# Patient Record
Sex: FEMALE | Race: WHITE | NOT HISPANIC OR LATINO | Employment: OTHER | URBAN - METROPOLITAN AREA
[De-identification: names, ages, dates, MRNs, and addresses within clinical notes are randomized per-mention and may not be internally consistent; named-entity substitution may affect disease eponyms.]

---

## 2017-02-13 RX ORDER — AMLODIPINE BESYLATE 5 MG/1
TABLET ORAL
Qty: 90 TABLET | Refills: 0 | Status: SHIPPED | OUTPATIENT
Start: 2017-02-13 | End: 2017-04-26 | Stop reason: SDUPTHER

## 2017-02-13 RX ORDER — DULOXETIN HYDROCHLORIDE 60 MG/1
CAPSULE, DELAYED RELEASE ORAL
Qty: 90 CAPSULE | Refills: 0 | Status: SHIPPED | OUTPATIENT
Start: 2017-02-13 | End: 2017-05-01 | Stop reason: SDUPTHER

## 2017-04-26 RX ORDER — AMLODIPINE BESYLATE 5 MG/1
TABLET ORAL
Qty: 90 TABLET | Refills: 0 | Status: SHIPPED | OUTPATIENT
Start: 2017-04-26 | End: 2017-05-01 | Stop reason: SDUPTHER

## 2017-05-01 ENCOUNTER — OFFICE VISIT (OUTPATIENT)
Dept: FAMILY MEDICINE CLINIC | Facility: CLINIC | Age: 62
End: 2017-05-01

## 2017-05-01 VITALS
TEMPERATURE: 97.9 F | HEIGHT: 61 IN | HEART RATE: 70 BPM | BODY MASS INDEX: 35.87 KG/M2 | SYSTOLIC BLOOD PRESSURE: 160 MMHG | DIASTOLIC BLOOD PRESSURE: 100 MMHG | OXYGEN SATURATION: 98 % | WEIGHT: 190 LBS

## 2017-05-01 DIAGNOSIS — F32.A ANXIETY AND DEPRESSION: Primary | ICD-10-CM

## 2017-05-01 DIAGNOSIS — E78.2 MIXED HYPERLIPIDEMIA: ICD-10-CM

## 2017-05-01 DIAGNOSIS — F41.9 ANXIETY AND DEPRESSION: Primary | ICD-10-CM

## 2017-05-01 DIAGNOSIS — I10 ESSENTIAL HYPERTENSION: ICD-10-CM

## 2017-05-01 LAB
ALBUMIN SERPL-MCNC: 4.1 G/DL (ref 3.5–5.2)
ALBUMIN/GLOB SERPL: 1.6 G/DL
ALP SERPL-CCNC: 91 U/L (ref 40–129)
ALT SERPL-CCNC: 41 U/L (ref 5–33)
AST SERPL-CCNC: 23 U/L (ref 5–32)
BILIRUB SERPL-MCNC: 0.3 MG/DL (ref 0.2–1.2)
BUN SERPL-MCNC: 14 MG/DL (ref 8–23)
BUN/CREAT SERPL: 16.9 (ref 7–25)
CALCIUM SERPL-MCNC: 9.4 MG/DL (ref 8.8–10.5)
CHLORIDE SERPL-SCNC: 104 MMOL/L (ref 98–107)
CHOLEST SERPL-MCNC: 431 MG/DL (ref 0–200)
CO2 SERPL-SCNC: 29.5 MMOL/L (ref 22–29)
CREAT SERPL-MCNC: 0.83 MG/DL (ref 0.57–1)
ERYTHROCYTE [DISTWIDTH] IN BLOOD BY AUTOMATED COUNT: 14 % (ref 11.5–14.5)
GLOBULIN SER CALC-MCNC: 2.6 GM/DL
GLUCOSE SERPL-MCNC: 116 MG/DL (ref 65–99)
HCT VFR BLD AUTO: 39.4 % (ref 37–47)
HDLC SERPL-MCNC: 57 MG/DL (ref 40–60)
HGB BLD-MCNC: 12.7 G/DL (ref 12–16)
LDLC SERPL CALC-MCNC: 320 MG/DL (ref 0–100)
MCH RBC QN AUTO: 27.5 PG (ref 27–31)
MCHC RBC AUTO-ENTMCNC: 32.2 G/DL (ref 31–37)
MCV RBC AUTO: 85.5 FL (ref 81–99)
PLATELET # BLD AUTO: 238 10*3/MM3 (ref 140–500)
POTASSIUM SERPL-SCNC: 5 MMOL/L (ref 3.5–5.2)
PROT SERPL-MCNC: 6.7 G/DL (ref 6–8.5)
RBC # BLD AUTO: 4.61 10*6/MM3 (ref 4.2–5.4)
SODIUM SERPL-SCNC: 146 MMOL/L (ref 136–145)
TRIGL SERPL-MCNC: 272 MG/DL (ref 0–150)
TSH SERPL DL<=0.005 MIU/L-ACNC: 2.44 MIU/ML (ref 0.27–4.2)
VLDLC SERPL CALC-MCNC: 54.4 MG/DL (ref 7–27)
WBC # BLD AUTO: 5.97 10*3/MM3 (ref 4.8–10.8)

## 2017-05-01 PROCEDURE — 99213 OFFICE O/P EST LOW 20 MIN: CPT | Performed by: FAMILY MEDICINE

## 2017-05-01 RX ORDER — DULOXETIN HYDROCHLORIDE 60 MG/1
60 CAPSULE, DELAYED RELEASE ORAL DAILY
Qty: 90 CAPSULE | Refills: 3 | Status: SHIPPED | OUTPATIENT
Start: 2017-05-01 | End: 2017-05-03 | Stop reason: SDUPTHER

## 2017-05-01 RX ORDER — AMLODIPINE BESYLATE 5 MG/1
5 TABLET ORAL DAILY
Qty: 90 TABLET | Refills: 3 | Status: SHIPPED | OUTPATIENT
Start: 2017-05-01 | End: 2017-05-03 | Stop reason: SDUPTHER

## 2017-05-01 RX ORDER — VALSARTAN AND HYDROCHLOROTHIAZIDE 320; 25 MG/1; MG/1
1 TABLET, FILM COATED ORAL DAILY
Qty: 90 TABLET | Refills: 3 | Status: SHIPPED | OUTPATIENT
Start: 2017-05-01 | End: 2017-05-03 | Stop reason: SDUPTHER

## 2017-05-02 DIAGNOSIS — E78.2 MIXED HYPERLIPIDEMIA: Primary | ICD-10-CM

## 2017-05-02 DIAGNOSIS — Z51.81 MEDICATION MONITORING ENCOUNTER: ICD-10-CM

## 2017-05-03 ENCOUNTER — TELEPHONE (OUTPATIENT)
Dept: FAMILY MEDICINE CLINIC | Facility: CLINIC | Age: 62
End: 2017-05-03

## 2017-05-03 DIAGNOSIS — I10 ESSENTIAL HYPERTENSION: ICD-10-CM

## 2017-05-03 DIAGNOSIS — F41.9 ANXIETY AND DEPRESSION: ICD-10-CM

## 2017-05-03 DIAGNOSIS — F32.A ANXIETY AND DEPRESSION: ICD-10-CM

## 2017-05-03 RX ORDER — AMLODIPINE BESYLATE 5 MG/1
5 TABLET ORAL DAILY
Qty: 90 TABLET | Refills: 3 | Status: SHIPPED | OUTPATIENT
Start: 2017-05-03 | End: 2018-05-23 | Stop reason: SDUPTHER

## 2017-05-03 RX ORDER — VALSARTAN AND HYDROCHLOROTHIAZIDE 320; 25 MG/1; MG/1
1 TABLET, FILM COATED ORAL DAILY
Qty: 90 TABLET | Refills: 3 | Status: SHIPPED | OUTPATIENT
Start: 2017-05-03 | End: 2018-05-18 | Stop reason: SDUPTHER

## 2017-05-03 RX ORDER — DULOXETIN HYDROCHLORIDE 60 MG/1
60 CAPSULE, DELAYED RELEASE ORAL DAILY
Qty: 90 CAPSULE | Refills: 3 | Status: SHIPPED | OUTPATIENT
Start: 2017-05-03 | End: 2018-05-18 | Stop reason: SDUPTHER

## 2017-10-02 ENCOUNTER — RESULTS ENCOUNTER (OUTPATIENT)
Dept: FAMILY MEDICINE CLINIC | Facility: CLINIC | Age: 62
End: 2017-10-02

## 2017-10-02 DIAGNOSIS — E78.2 MIXED HYPERLIPIDEMIA: ICD-10-CM

## 2017-10-02 DIAGNOSIS — Z51.81 MEDICATION MONITORING ENCOUNTER: ICD-10-CM

## 2018-05-18 DIAGNOSIS — I10 ESSENTIAL HYPERTENSION: ICD-10-CM

## 2018-05-18 DIAGNOSIS — F41.9 ANXIETY AND DEPRESSION: ICD-10-CM

## 2018-05-18 DIAGNOSIS — F32.A ANXIETY AND DEPRESSION: ICD-10-CM

## 2018-05-18 RX ORDER — VALSARTAN AND HYDROCHLOROTHIAZIDE 320; 25 MG/1; MG/1
TABLET, FILM COATED ORAL
Qty: 30 TABLET | Refills: 0 | Status: SHIPPED | OUTPATIENT
Start: 2018-05-18 | End: 2018-05-23 | Stop reason: SDUPTHER

## 2018-05-18 RX ORDER — DULOXETIN HYDROCHLORIDE 60 MG/1
CAPSULE, DELAYED RELEASE ORAL
Qty: 30 CAPSULE | Refills: 0 | Status: SHIPPED | OUTPATIENT
Start: 2018-05-18 | End: 2018-05-23 | Stop reason: SDUPTHER

## 2018-05-23 ENCOUNTER — OFFICE VISIT (OUTPATIENT)
Dept: FAMILY MEDICINE CLINIC | Facility: CLINIC | Age: 63
End: 2018-05-23

## 2018-05-23 VITALS
OXYGEN SATURATION: 95 % | TEMPERATURE: 97.7 F | WEIGHT: 179.9 LBS | SYSTOLIC BLOOD PRESSURE: 142 MMHG | BODY MASS INDEX: 33.96 KG/M2 | HEART RATE: 77 BPM | DIASTOLIC BLOOD PRESSURE: 62 MMHG | HEIGHT: 61 IN

## 2018-05-23 DIAGNOSIS — E78.2 MIXED HYPERLIPIDEMIA: ICD-10-CM

## 2018-05-23 DIAGNOSIS — E55.9 AVITAMINOSIS D: ICD-10-CM

## 2018-05-23 DIAGNOSIS — E53.8 B12 DEFICIENCY: ICD-10-CM

## 2018-05-23 DIAGNOSIS — F41.9 ANXIETY AND DEPRESSION: Primary | ICD-10-CM

## 2018-05-23 DIAGNOSIS — F32.A ANXIETY AND DEPRESSION: Primary | ICD-10-CM

## 2018-05-23 DIAGNOSIS — I10 ESSENTIAL HYPERTENSION: ICD-10-CM

## 2018-05-23 DIAGNOSIS — M19.90 ARTHRITIS: ICD-10-CM

## 2018-05-23 DIAGNOSIS — Z00.00 ROUTINE ADULT HEALTH MAINTENANCE: ICD-10-CM

## 2018-05-23 PROCEDURE — 99214 OFFICE O/P EST MOD 30 MIN: CPT | Performed by: FAMILY MEDICINE

## 2018-05-23 RX ORDER — MAGNESIUM GLUCONATE 27 MG(500)
27 TABLET ORAL 2 TIMES DAILY
COMMUNITY

## 2018-05-23 RX ORDER — POTASSIUM CHLORIDE 750 MG/1
10 TABLET, FILM COATED, EXTENDED RELEASE ORAL 2 TIMES DAILY
COMMUNITY
End: 2018-08-14

## 2018-05-23 RX ORDER — VITAMIN B COMPLEX
CAPSULE ORAL DAILY
COMMUNITY
End: 2018-08-14

## 2018-05-23 RX ORDER — VALSARTAN AND HYDROCHLOROTHIAZIDE 320; 25 MG/1; MG/1
1 TABLET, FILM COATED ORAL DAILY
Qty: 90 TABLET | Refills: 3 | Status: SHIPPED | OUTPATIENT
Start: 2018-05-23 | End: 2018-07-23 | Stop reason: RX

## 2018-05-23 RX ORDER — DULOXETIN HYDROCHLORIDE 30 MG/1
30 CAPSULE, DELAYED RELEASE ORAL DAILY
Qty: 7 CAPSULE | Refills: 0 | Status: SHIPPED | OUTPATIENT
Start: 2018-05-23 | End: 2018-05-30

## 2018-05-23 RX ORDER — AMLODIPINE BESYLATE 10 MG/1
10 TABLET ORAL DAILY
Qty: 90 TABLET | Refills: 3 | Status: SHIPPED | OUTPATIENT
Start: 2018-05-23 | End: 2018-10-24 | Stop reason: SDUPTHER

## 2018-05-23 RX ORDER — FAMOTIDINE 20 MG
5000 TABLET ORAL
COMMUNITY

## 2018-05-23 RX ORDER — TURMERIC ROOT EXTRACT 500 MG
TABLET ORAL
COMMUNITY
End: 2019-03-26

## 2018-05-23 NOTE — PROGRESS NOTES
"  Chief Complaint   Patient presents with   • Follow-up   • Arthritis   • Hypertension   • Hyperlipidemia       Subjective     Patient here for follow-up of elevated blood pressure.    She is not exercising and is not adherent to a low-salt diet.    Blood pressure is well controlled at home.   Cardiac symptoms: fatigue.   Patient denies: chest pressure/discomfort, claudication, cough, dyspnea, exertional chest pressure/discomfort, irregular heart beat, lower extremity edema, near-syncope and palpitations.   Cardiovascular risk factors: dyslipidemia, family history of premature cardiovascular disease, hypertension and sedentary lifestyle.   Use of agents associated with hypertension: none.   History of target organ damage: none.  Patient is taking prescribed hypertension medications as prescribed without side effects.    C/o all over joint pain.   B ankles, B knee, B hip, low back.  600 mg ibuprofen helps only some.    Has a busy life she has custody of her grand daughter.    Out of refills  Due for labs    The following portions of the patient's history were reviewed and updated as appropriate: allergies, current medications, past family history, past medical history, past social history and problem list.    Review of Systems  A comprehensive review of systems was negative except for: Musculoskeletal: positive for arthralgias, back pain and stiff joints         Vitals:    05/23/18 1600   BP: 142/62   BP Location: Left arm   Patient Position: Sitting   Pulse: 77   Temp: 97.7 °F (36.5 °C)   TempSrc: Oral   SpO2: 95%   Weight: 81.6 kg (179 lb 14.4 oz)   Height: 154.9 cm (61\")     Objective    Gen: alert, pleasant.  HEENT: PERRL, EOMI intact, lids ok, ear canals clear, TMs normal, throat clear, nostrils normal  Neck: no bruit, no enlarged thyroid  Lungs: CTA  Heart: RR no murmur  ABD: soft , + BS  Pulses: intact  Mood: stable     Assessment/Plan   Hypertension, normal blood pressure Evidence of target organ damage: " none.    Nery was seen today for follow-up, arthritis, hypertension and hyperlipidemia.    Diagnoses and all orders for this visit:    Anxiety and depression  -     DULoxetine (CYMBALTA) 30 MG capsule; Take 1 capsule by mouth Daily for 7 days.    Essential hypertension  -     CBC (No Diff); Future  -     Comprehensive Metabolic Panel; Future  -     amLODIPine (NORVASC) 10 MG tablet; Take 1 tablet by mouth Daily.  -     valsartan-hydrochlorothiazide (DIOVAN-HCT) 320-25 MG per tablet; Take 1 tablet by mouth Daily. 200001    Mixed hyperlipidemia  -     Lipid Panel; Future  -     Cancel: TSH  -     TSH; Future    Avitaminosis D  -     Vitamin D 25 Hydroxy; Future    B12 deficiency  -     Vitamin B12; Future    Routine adult health maintenance    Arthritis  -     RUBÉN w/Reflex; Future  -     Cyclic citrul peptide antibody, IgG/IgA; Future  -     CK; Future  -     C-reactive protein; Future  -     Rheumatoid factor; Future  -     Sedimentation rate; Future      Medication: no change.  Follow up: 6 months and as needed.    There are no Patient Instructions on file for this visit.  Medications Discontinued During This Encounter   Medication Reason   • Cyanocobalamin (VITAMIN B 12 PO) *Therapy completed   • amLODIPine (NORVASC) 5 MG tablet Reorder   • DULoxetine (CYMBALTA) 60 MG capsule Reorder   • valsartan-hydrochlorothiazide (DIOVAN-HCT) 320-25 MG per tablet Reorder        Return in about 6 months (around 11/23/2018) for blood pressure.    Limit salt  Limit alcoholic drinks to 1 a day  Limit caffeine to 1-2 servings a day    Dr. John Parker MD  [place], [place]  [facility].

## 2018-05-24 DIAGNOSIS — I10 ESSENTIAL HYPERTENSION: ICD-10-CM

## 2018-05-24 DIAGNOSIS — E53.8 B12 DEFICIENCY: ICD-10-CM

## 2018-05-24 DIAGNOSIS — E55.9 AVITAMINOSIS D: ICD-10-CM

## 2018-05-24 DIAGNOSIS — E78.2 MIXED HYPERLIPIDEMIA: ICD-10-CM

## 2018-05-24 DIAGNOSIS — M19.90 ARTHRITIS: ICD-10-CM

## 2018-05-30 DIAGNOSIS — E78.2 MIXED HYPERLIPIDEMIA: Primary | ICD-10-CM

## 2018-05-30 LAB
25(OH)D3+25(OH)D2 SERPL-MCNC: 46.8 NG/ML
ALBUMIN SERPL-MCNC: 4.2 G/DL (ref 3.5–5.2)
ALBUMIN/GLOB SERPL: 1.9 G/DL
ALP SERPL-CCNC: 79 U/L (ref 40–129)
ALT SERPL-CCNC: 25 U/L (ref 5–33)
ANA SER QL: NEGATIVE
AST SERPL-CCNC: 21 U/L (ref 5–32)
BILIRUB SERPL-MCNC: 0.3 MG/DL (ref 0.2–1.2)
BUN SERPL-MCNC: 16 MG/DL (ref 8–23)
BUN/CREAT SERPL: 23.9 (ref 7–25)
CALCIUM SERPL-MCNC: 9.6 MG/DL (ref 8.8–10.5)
CCP IGA+IGG SERPL IA-ACNC: 4 UNITS (ref 0–19)
CHLORIDE SERPL-SCNC: 99 MMOL/L (ref 98–107)
CHOLEST SERPL-MCNC: 368 MG/DL (ref 0–200)
CK SERPL-CCNC: 111 U/L (ref 26–142)
CO2 SERPL-SCNC: 29.3 MMOL/L (ref 22–29)
CREAT SERPL-MCNC: 0.67 MG/DL (ref 0.57–1)
CRP SERPL-MCNC: 0.23 MG/DL (ref 0–0.5)
ERYTHROCYTE [DISTWIDTH] IN BLOOD BY AUTOMATED COUNT: 13.2 % (ref 11.5–14.5)
ERYTHROCYTE [SEDIMENTATION RATE] IN BLOOD BY WESTERGREN METHOD: 6 MM/HR (ref 0–30)
GFR SERPLBLD CREATININE-BSD FMLA CKD-EPI: 107 ML/MIN/1.73
GFR SERPLBLD CREATININE-BSD FMLA CKD-EPI: 89 ML/MIN/1.73
GLOBULIN SER CALC-MCNC: 2.2 GM/DL
GLUCOSE SERPL-MCNC: 107 MG/DL (ref 65–99)
HCT VFR BLD AUTO: 43.5 % (ref 37–47)
HDLC SERPL-MCNC: 64 MG/DL (ref 40–60)
HGB BLD-MCNC: 14.5 G/DL (ref 12–16)
LDLC SERPL CALC-MCNC: 270 MG/DL (ref 0–100)
MCH RBC QN AUTO: 29.1 PG (ref 27–31)
MCHC RBC AUTO-ENTMCNC: 33.3 G/DL (ref 31–37)
MCV RBC AUTO: 87.3 FL (ref 81–99)
PLATELET # BLD AUTO: 238 10*3/MM3 (ref 140–500)
POTASSIUM SERPL-SCNC: 3.7 MMOL/L (ref 3.5–5.2)
PROT SERPL-MCNC: 6.4 G/DL (ref 6–8.5)
RBC # BLD AUTO: 4.98 10*6/MM3 (ref 4.2–5.4)
RHEUMATOID FACT SERPL-ACNC: <10 IU/ML (ref 0–13.9)
SODIUM SERPL-SCNC: 141 MMOL/L (ref 136–145)
TRIGL SERPL-MCNC: 171 MG/DL (ref 0–150)
TSH SERPL DL<=0.005 MIU/L-ACNC: 1.58 MIU/ML (ref 0.27–4.2)
VIT B12 SERPL-MCNC: 610 PG/ML
VLDLC SERPL CALC-MCNC: 34.2 MG/DL (ref 7–27)
WBC # BLD AUTO: 6.24 10*3/MM3 (ref 4.8–10.8)

## 2018-05-30 RX ORDER — EZETIMIBE 10 MG/1
10 TABLET ORAL DAILY
Qty: 90 TABLET | Refills: 3 | Status: SHIPPED | OUTPATIENT
Start: 2018-05-30 | End: 2018-08-14

## 2018-07-23 ENCOUNTER — TELEPHONE (OUTPATIENT)
Dept: FAMILY MEDICINE CLINIC | Facility: CLINIC | Age: 63
End: 2018-07-23

## 2018-07-23 DIAGNOSIS — I10 ESSENTIAL HYPERTENSION: Primary | ICD-10-CM

## 2018-07-23 RX ORDER — LOSARTAN POTASSIUM AND HYDROCHLOROTHIAZIDE 25; 100 MG/1; MG/1
1 TABLET ORAL DAILY
Qty: 30 TABLET | Refills: 2 | Status: SHIPPED | OUTPATIENT
Start: 2018-07-23 | End: 2018-07-24 | Stop reason: SDUPTHER

## 2018-07-23 NOTE — TELEPHONE ENCOUNTER
Notify patient that FDA has recalled her valsartan/HCTZ medication.  I have changed to Hyzaar 100/25 mg.  New prescription was sent to [facility].  Please have her schedule follow-up appointment with Dr. Parker in the next 4 weeks for blood pressure check.

## 2018-07-24 DIAGNOSIS — I10 ESSENTIAL HYPERTENSION: ICD-10-CM

## 2018-07-24 RX ORDER — LOSARTAN POTASSIUM AND HYDROCHLOROTHIAZIDE 25; 100 MG/1; MG/1
1 TABLET ORAL DAILY
Qty: 30 TABLET | Refills: 2 | Status: SHIPPED | OUTPATIENT
Start: 2018-07-24 | End: 2018-08-14 | Stop reason: SINTOL

## 2018-07-24 NOTE — TELEPHONE ENCOUNTER
Pt infomed and medication was sent to [facility] in [place] due to willi being out of the hyzaar

## 2018-08-14 ENCOUNTER — OFFICE VISIT (OUTPATIENT)
Dept: FAMILY MEDICINE CLINIC | Facility: CLINIC | Age: 63
End: 2018-08-14

## 2018-08-14 VITALS
HEIGHT: 61 IN | WEIGHT: 184 LBS | OXYGEN SATURATION: 96 % | DIASTOLIC BLOOD PRESSURE: 100 MMHG | HEART RATE: 79 BPM | SYSTOLIC BLOOD PRESSURE: 148 MMHG | BODY MASS INDEX: 34.74 KG/M2

## 2018-08-14 DIAGNOSIS — I10 ESSENTIAL HYPERTENSION: Primary | ICD-10-CM

## 2018-08-14 DIAGNOSIS — E78.2 MIXED HYPERLIPIDEMIA: ICD-10-CM

## 2018-08-14 PROCEDURE — 99214 OFFICE O/P EST MOD 30 MIN: CPT | Performed by: FAMILY MEDICINE

## 2018-08-14 RX ORDER — EZETIMIBE 10 MG/1
10 TABLET ORAL DAILY
Qty: 30 TABLET | Refills: 11 | Status: SHIPPED | OUTPATIENT
Start: 2018-08-14 | End: 2018-10-19 | Stop reason: SDUPTHER

## 2018-08-14 RX ORDER — PREGABALIN 50 MG/1
50 CAPSULE ORAL 3 TIMES DAILY
Qty: 90 CAPSULE | Refills: 1 | Status: SHIPPED | OUTPATIENT
Start: 2018-08-14 | End: 2018-09-21

## 2018-08-14 NOTE — PROGRESS NOTES
Chief Complaint   Patient presents with   • Follow-up   • Hypertension   • Hyperlipidemia   • Fibromyalgia       Subjective    Diovan was changes to Hyzaar due to drug recall.      Patient here for follow-up of elevated blood pressure.    She is not exercising and is adherent to a low-salt diet.    Blood pressure is not well controlled at home.   Cardiac symptoms: fatigue, lower extremity edema and weight gain.   Patient denies: chest pain.   Cardiovascular risk factors: advanced age (older than 55 for men, 65 for women), dyslipidemia, family history of premature cardiovascular disease, hypertension, obesity (BMI >= 30 kg/m2) and sedentary lifestyle.   Use of agents associated with hypertension: none.   History of target organ damage: none.  Patient is taking prescribed hypertension medications as prescribed without side effects.        The following portions of the patient's history were reviewed and updated as appropriate: allergies, current medications, past family history, past medical history, past social history, past surgical history and problem list.    Review of Systems  A comprehensive review of systems was negative except for: Constitutional: positive for fatigue and malaise  Musculoskeletal: positive for arthralgias, back pain, muscle weakness, myalgias, neck pain and stiff joints  Behavioral/Psych: positive for anxiety, depression, mood swings, obesity and sleep disturbance    Results for orders placed or performed in visit on 05/24/18   CBC (No Diff)   Result Value Ref Range    WBC 6.24 4.80 - 10.80 10*3/mm3    RBC 4.98 4.20 - 5.40 10*6/mm3    Hemoglobin 14.5 12.0 - 16.0 g/dL    Hematocrit 43.5 37.0 - 47.0 %    MCV 87.3 81.0 - 99.0 fL    MCH 29.1 27.0 - 31.0 pg    MCHC 33.3 31.0 - 37.0 g/dL    RDW 13.2 11.5 - 14.5 %    Platelets 238 140 - 500 10*3/mm3   Comprehensive Metabolic Panel   Result Value Ref Range    Glucose 107 (H) 65 - 99 mg/dL    BUN 16 8 - 23 mg/dL    Creatinine 0.67 0.57 - 1.00 mg/dL  "   eGFR Non African Am 89 >60 mL/min/1.73    eGFR African Am 107 >60 mL/min/1.73    BUN/Creatinine Ratio 23.9 7.0 - 25.0    Sodium 141 136 - 145 mmol/L    Potassium 3.7 3.5 - 5.2 mmol/L    Chloride 99 98 - 107 mmol/L    Total CO2 29.3 (H) 22.0 - 29.0 mmol/L    Calcium 9.6 8.8 - 10.5 mg/dL    Total Protein 6.4 6.0 - 8.5 g/dL    Albumin 4.20 3.50 - 5.20 g/dL    Globulin 2.2 gm/dL    A/G Ratio 1.9 g/dL    Total Bilirubin 0.3 0.2 - 1.2 mg/dL    Alkaline Phosphatase 79 40 - 129 U/L    AST (SGOT) 21 5 - 32 U/L    ALT (SGPT) 25 5 - 33 U/L   Vitamin D 25 Hydroxy   Result Value Ref Range    25 Hydroxy, Vitamin D 46.8 ng/ml   Vitamin B12   Result Value Ref Range    Vitamin B-12 610 232-1,245 pg/mL   RUBÉN w/Reflex   Result Value Ref Range    RUBÉN Direct Negative Negative   Cyclic citrul peptide antibody, IgG/IgA   Result Value Ref Range    CCP Antibodies IgG/IgA 4 0 - 19 units   CK   Result Value Ref Range    Creatine Kinase 111 26 - 142 U/L   C-reactive protein   Result Value Ref Range    C-Reactive Protein 0.23 0.00 - 0.50 mg/dL   Sedimentation rate   Result Value Ref Range    Sed Rate 6 0 - 30 mm/hr   TSH   Result Value Ref Range    TSH 1.580 0.270 - 4.200 mIU/mL   Lipid Panel   Result Value Ref Range    Total Cholesterol 368 (H) 0 - 200 mg/dL    Triglycerides 171 (H) 0 - 150 mg/dL    HDL Cholesterol 64 (H) 40 - 60 mg/dL    VLDL Cholesterol 34.2 (H) 7 - 27 mg/dL    LDL Cholesterol  270 (H) 0 - 100 mg/dL   Rheumatoid Factor   Result Value Ref Range    RA Latex Turbid <10.0 0.0 - 13.9 IU/mL        Vitals:    08/14/18 1545   BP: 148/100   BP Location: Left arm   Patient Position: Sitting   Pulse: 79   SpO2: 96%   Weight: 83.5 kg (184 lb)   Height: 154.9 cm (61\")     Objective    Gen: alert, pleasant.  HEENT: PERRL, EOMI intact, lids ok, ear canals clear, TMs normal, throat clear, nostrils normal  Neck: no bruit, no enlarged thyroid  Lungs: CTA  Heart: RR no murmur  ABD: soft , + BS  Pulses: intact  Mood: stable   "   Assessment/Plan   Hypertension, stage 1 Evidence of target organ damage: none.    Nery was seen today for follow-up, hypertension, hyperlipidemia and fibromyalgia.    Diagnoses and all orders for this visit:    Essential hypertension    Mixed hyperlipidemia  -     ezetimibe (ZETIA) 10 MG tablet; Take 1 tablet by mouth Daily.    Other orders  -     pregabalin (LYRICA) 50 MG capsule; Take 1 capsule by mouth 3 (Three) Times a Day.    lets try Lyrica for fibromyalgia.  Cymbalta did not help.  Will re send Zetia to a different pharm.    Medication: discontinue hyzaar due to suspected fatigue.  Follow up: 1 month and as needed.    There are no Patient Instructions on file for this visit.  Medications Discontinued During This Encounter   Medication Reason   • B Complex Vitamins (VITAMIN B COMPLEX) capsule capsule *Therapy completed   • ezetimibe (ZETIA) 10 MG tablet *Therapy completed   • Garlic 10 MG capsule *Therapy completed   • NIACIN PO *Therapy completed   • potassium chloride (K-DUR) 10 MEQ CR tablet *Therapy completed   • losartan-hydrochlorothiazide (HYZAAR) 100-25 MG per tablet Side effects        Return in about 1 month (around 9/14/2018) for blood pressure, new medication follow up.    Patient has been prescribed controlled medications.  Treatment discussed  DAT updated and reviewed.  Risk and Benefits discussed.  Per [place]HB1.  Limit salt  Limit alcoholic drinks to 1 a day  Limit caffeine to 1-2 servings a day    Dr. John Parker MD  [place], [place]  [facility].

## 2018-08-27 ENCOUNTER — TELEPHONE (OUTPATIENT)
Dept: FAMILY MEDICINE CLINIC | Facility: CLINIC | Age: 63
End: 2018-08-27

## 2018-08-27 DIAGNOSIS — M79.7 FIBROMYALGIA: Primary | ICD-10-CM

## 2018-08-27 RX ORDER — AMITRIPTYLINE HYDROCHLORIDE 25 MG/1
25 TABLET, FILM COATED ORAL NIGHTLY
Qty: 30 TABLET | Refills: 5 | Status: SHIPPED | OUTPATIENT
Start: 2018-08-27 | End: 2018-09-21 | Stop reason: SINTOL

## 2018-08-27 NOTE — TELEPHONE ENCOUNTER
Pt called back and said she would like to try the Elavil that you suggested instead of her Lyrica.     Ok i sent in the elavil 25 mg , take every evening    John Parker MD

## 2018-09-21 ENCOUNTER — OFFICE VISIT (OUTPATIENT)
Dept: FAMILY MEDICINE CLINIC | Facility: CLINIC | Age: 63
End: 2018-09-21

## 2018-09-21 VITALS
OXYGEN SATURATION: 97 % | HEIGHT: 61 IN | SYSTOLIC BLOOD PRESSURE: 158 MMHG | HEART RATE: 71 BPM | BODY MASS INDEX: 33.79 KG/M2 | WEIGHT: 179 LBS | DIASTOLIC BLOOD PRESSURE: 100 MMHG

## 2018-09-21 DIAGNOSIS — I10 ESSENTIAL HYPERTENSION: Primary | ICD-10-CM

## 2018-09-21 DIAGNOSIS — M19.90 ARTHRITIS: ICD-10-CM

## 2018-09-21 PROCEDURE — 99213 OFFICE O/P EST LOW 20 MIN: CPT | Performed by: FAMILY MEDICINE

## 2018-09-21 RX ORDER — HYDROCHLOROTHIAZIDE 25 MG/1
25 TABLET ORAL DAILY
Qty: 30 TABLET | Refills: 5 | Status: SHIPPED | OUTPATIENT
Start: 2018-09-21 | End: 2018-10-19 | Stop reason: SDUPTHER

## 2018-09-21 RX ORDER — IBUPROFEN 800 MG/1
800 TABLET ORAL 3 TIMES DAILY PRN
Qty: 90 TABLET | Refills: 5 | Status: SHIPPED | OUTPATIENT
Start: 2018-09-21 | End: 2019-09-03

## 2018-09-21 NOTE — PROGRESS NOTES
"  Subjective   Nery Jarrell is a 63 y.o. female who is here for   Chief Complaint   Patient presents with   • Follow-up   • Anxiety   • Hypertension   • Fatigue   • Muscle Pain   .     History of Present Illness   Last OV we tried Lyrica for her chronic wide spread pain  Insurance denied med  So i sent in Elavil to replace.  Did not take.  Just wants to take Motrin  And get bp down  Ok by me.        The following portions of the patient's history were reviewed and updated as appropriate: allergies, current medications, past family history, past medical history, past social history, past surgical history and problem list.    Review of Systems  Vitals:    09/21/18 1426   BP: 158/100   BP Location: Left arm   Patient Position: Sitting   Pulse: 71   SpO2: 97%   Weight: 81.2 kg (179 lb)   Height: 154.9 cm (61\")     Objective   Physical Exam   Constitutional: She appears well-developed and well-nourished.   Cardiovascular: Normal rate.    Pulmonary/Chest: Effort normal.   Neurological: She is alert.   Nursing note and vitals reviewed.      Assessment/Plan   Nery was seen today for follow-up, anxiety, hypertension, fatigue and muscle pain.    Diagnoses and all orders for this visit:    Essential hypertension  -     hydrochlorothiazide (HYDRODIURIL) 25 MG tablet; Take 1 tablet by mouth Daily.    Arthritis  -     ibuprofen (ADVIL,MOTRIN) 800 MG tablet; Take 1 tablet by mouth 3 (Three) Times a Day As Needed for Mild Pain .    already on Norvasc 10  Lets add HCTZ  There are no Patient Instructions on file for this visit.    Medications Discontinued During This Encounter   Medication Reason   • amitriptyline (ELAVIL) 25 MG tablet Side effects   • pregabalin (LYRICA) 50 MG capsule *Therapy completed        Return in about 6 months (around 3/21/2019) for blood pressure, new medication follow up.    Dr. John Parker  [place], [place]  "

## 2018-10-19 DIAGNOSIS — E78.2 MIXED HYPERLIPIDEMIA: ICD-10-CM

## 2018-10-19 DIAGNOSIS — I10 ESSENTIAL HYPERTENSION: ICD-10-CM

## 2018-10-19 RX ORDER — EZETIMIBE 10 MG/1
10 TABLET ORAL DAILY
Qty: 90 TABLET | Refills: 3 | Status: SHIPPED | OUTPATIENT
Start: 2018-10-19 | End: 2019-03-26 | Stop reason: SDUPTHER

## 2018-10-19 RX ORDER — HYDROCHLOROTHIAZIDE 25 MG/1
25 TABLET ORAL DAILY
Qty: 90 TABLET | Refills: 1 | Status: SHIPPED | OUTPATIENT
Start: 2018-10-19 | End: 2019-04-29 | Stop reason: SDUPTHER

## 2018-10-24 DIAGNOSIS — I10 ESSENTIAL HYPERTENSION: ICD-10-CM

## 2018-10-24 RX ORDER — AMLODIPINE BESYLATE 10 MG/1
10 TABLET ORAL DAILY
Qty: 90 TABLET | Refills: 3 | Status: SHIPPED | OUTPATIENT
Start: 2018-10-24 | End: 2019-03-26 | Stop reason: SINTOL

## 2019-03-12 DIAGNOSIS — Z00.00 ROUTINE ADULT HEALTH MAINTENANCE: ICD-10-CM

## 2019-03-12 DIAGNOSIS — E78.2 MIXED HYPERLIPIDEMIA: Primary | ICD-10-CM

## 2019-03-12 DIAGNOSIS — I10 ESSENTIAL HYPERTENSION: ICD-10-CM

## 2019-03-14 LAB
ALBUMIN SERPL-MCNC: 4.3 G/DL (ref 3.5–5.2)
ALBUMIN/GLOB SERPL: 1.8 G/DL
ALP SERPL-CCNC: 120 U/L (ref 39–117)
ALT SERPL-CCNC: 43 U/L (ref 1–33)
APPEARANCE UR: CLEAR
AST SERPL-CCNC: 26 U/L (ref 1–32)
BACTERIA #/AREA URNS HPF: ABNORMAL /HPF
BILIRUB SERPL-MCNC: 0.4 MG/DL (ref 0.1–1.2)
BILIRUB UR QL STRIP: NEGATIVE
BUN SERPL-MCNC: 17 MG/DL (ref 8–23)
BUN/CREAT SERPL: 20.7 (ref 7–25)
CALCIUM SERPL-MCNC: 9.6 MG/DL (ref 8.6–10.5)
CASTS URNS MICRO: ABNORMAL
CHLORIDE SERPL-SCNC: 102 MMOL/L (ref 98–107)
CHOLEST SERPL-MCNC: 318 MG/DL (ref 0–200)
CO2 SERPL-SCNC: 27.1 MMOL/L (ref 22–29)
COLOR UR: YELLOW
CREAT SERPL-MCNC: 0.82 MG/DL (ref 0.57–1)
EPI CELLS #/AREA URNS HPF: ABNORMAL /HPF
ERYTHROCYTE [DISTWIDTH] IN BLOOD BY AUTOMATED COUNT: 14.3 % (ref 12.3–15.4)
GLOBULIN SER CALC-MCNC: 2.4 GM/DL
GLUCOSE SERPL-MCNC: 109 MG/DL (ref 65–99)
GLUCOSE UR QL: NEGATIVE
HCT VFR BLD AUTO: 45.4 % (ref 34–46.6)
HDLC SERPL-MCNC: 64 MG/DL (ref 40–60)
HGB BLD-MCNC: 14.3 G/DL (ref 12–15.9)
HGB UR QL STRIP: NEGATIVE
KETONES UR QL STRIP: NEGATIVE
LDLC SERPL CALC-MCNC: 232 MG/DL (ref 0–100)
LDLC/HDLC SERPL: 3.63 {RATIO}
LEUKOCYTE ESTERASE UR QL STRIP: ABNORMAL
MCH RBC QN AUTO: 27.9 PG (ref 26.6–33)
MCHC RBC AUTO-ENTMCNC: 31.5 G/DL (ref 31.5–35.7)
MCV RBC AUTO: 88.7 FL (ref 79–97)
NITRITE UR QL STRIP: NEGATIVE
PH UR STRIP: 5.5 [PH] (ref 5–8)
PLATELET # BLD AUTO: 217 10*3/MM3 (ref 140–450)
POTASSIUM SERPL-SCNC: 4.6 MMOL/L (ref 3.5–5.2)
PROT SERPL-MCNC: 6.7 G/DL (ref 6–8.5)
PROT UR QL STRIP: NEGATIVE
RBC # BLD AUTO: 5.12 10*6/MM3 (ref 3.77–5.28)
RBC #/AREA URNS HPF: ABNORMAL /HPF
SODIUM SERPL-SCNC: 140 MMOL/L (ref 136–145)
SP GR UR: 1.02 (ref 1–1.03)
TRIGL SERPL-MCNC: 109 MG/DL (ref 0–150)
TSH SERPL DL<=0.005 MIU/L-ACNC: 2.41 MIU/ML (ref 0.27–4.2)
UROBILINOGEN UR STRIP-MCNC: ABNORMAL MG/DL
VLDLC SERPL CALC-MCNC: 21.8 MG/DL (ref 5–40)
WBC # BLD AUTO: 6.45 10*3/MM3 (ref 3.4–10.8)
WBC #/AREA URNS HPF: ABNORMAL /HPF

## 2019-03-26 ENCOUNTER — OFFICE VISIT (OUTPATIENT)
Dept: FAMILY MEDICINE CLINIC | Facility: CLINIC | Age: 64
End: 2019-03-26

## 2019-03-26 VITALS
OXYGEN SATURATION: 97 % | WEIGHT: 182 LBS | SYSTOLIC BLOOD PRESSURE: 182 MMHG | HEART RATE: 69 BPM | DIASTOLIC BLOOD PRESSURE: 112 MMHG | HEIGHT: 61 IN | BODY MASS INDEX: 34.36 KG/M2

## 2019-03-26 DIAGNOSIS — Z00.00 MEDICARE WELCOME EXAM: ICD-10-CM

## 2019-03-26 DIAGNOSIS — Z00.00 ROUTINE ADULT HEALTH MAINTENANCE: ICD-10-CM

## 2019-03-26 DIAGNOSIS — E78.2 MIXED HYPERLIPIDEMIA: ICD-10-CM

## 2019-03-26 DIAGNOSIS — I10 UNCONTROLLED HYPERTENSION: ICD-10-CM

## 2019-03-26 DIAGNOSIS — Z23 NEED FOR VACCINATION FOR PNEUMOCOCCUS: ICD-10-CM

## 2019-03-26 DIAGNOSIS — I10 ESSENTIAL HYPERTENSION: Primary | ICD-10-CM

## 2019-03-26 DIAGNOSIS — Z12.11 SCREEN FOR COLON CANCER: ICD-10-CM

## 2019-03-26 PROCEDURE — G0403 EKG FOR INITIAL PREVENT EXAM: HCPCS | Performed by: FAMILY MEDICINE

## 2019-03-26 PROCEDURE — G0402 INITIAL PREVENTIVE EXAM: HCPCS | Performed by: FAMILY MEDICINE

## 2019-03-26 PROCEDURE — 90670 PCV13 VACCINE IM: CPT | Performed by: FAMILY MEDICINE

## 2019-03-26 PROCEDURE — 99213 OFFICE O/P EST LOW 20 MIN: CPT | Performed by: FAMILY MEDICINE

## 2019-03-26 PROCEDURE — G0009 ADMIN PNEUMOCOCCAL VACCINE: HCPCS | Performed by: FAMILY MEDICINE

## 2019-03-26 RX ORDER — ROSUVASTATIN CALCIUM 5 MG/1
5 TABLET, COATED ORAL DAILY
Qty: 90 TABLET | Refills: 3 | Status: SHIPPED | OUTPATIENT
Start: 2019-03-26 | End: 2020-03-24

## 2019-03-26 RX ORDER — IRBESARTAN 300 MG/1
300 TABLET ORAL EVERY MORNING
Qty: 30 TABLET | Refills: 5 | Status: SHIPPED | OUTPATIENT
Start: 2019-03-26 | End: 2019-04-29 | Stop reason: SDUPTHER

## 2019-03-26 RX ORDER — EZETIMIBE 10 MG/1
10 TABLET ORAL DAILY
Qty: 90 TABLET | Refills: 3 | Status: SHIPPED | OUTPATIENT
Start: 2019-03-26 | End: 2020-02-21

## 2019-03-26 NOTE — PROGRESS NOTES
Welcome to Medicare Wellness Visit   The ABC's of the Annual Wellness Visit    Chief Complaint   Patient presents with   • Welcome To Medicare       HPI:  Nery Jarrell, -1955, is a 64 y.o. female who presents for a Welcome to Medicare Wellness Visit.    Recent Hospitalizations:  No hospitalization(s) within the last year..    Current Medical Providers:  Patient Care Team:  John Parker MD as PCP - General  [facility], Luz Haque MD as Consulting Physician (Obstetrics and Gynecology)  Kareem, Iggy Díaz MD as Consulting Physician (Gastroenterology)    Health Habits and Functional and Cognitive Screening and Depression Screening:  Functional & Cognitive Status 3/26/2019   Do you have difficulty preparing food and eating? No   Do you have difficulty bathing yourself, getting dressed or grooming yourself? No   Do you have difficulty using the toilet? No   Do you have difficulty moving around from place to place? Yes   Do you have trouble with steps or getting out of a bed or a chair? No   In the past year have you fallen or experienced a near fall? No   Current Diet Well Balanced Diet   Dental Exam Up to date   Eye Exam Not up to date   Exercise (times per week) 0 times per week   Current Exercise Activities Include None   Do you need help using the phone?  No   Are you deaf or do you have serious difficulty hearing?  No   Do you need help with transportation? No   Do you need help shopping? No   Do you need help preparing meals?  No   Do you need help with housework?  No   Do you need help with laundry? No   Do you need help taking your medications? No   Do you need help managing money? No   Do you ever drive or ride in a car without wearing a seat belt? No   Have you felt unusual stress, anger or loneliness in the last month? No   Who do you live with? Spouse   If you need help, do you have trouble finding someone available to you? No   Have you been bothered in the last four weeks by sexual  problems? No   Do you have difficulty concentrating, remembering or making decisions? No       Compared to one year ago, the patient feels her physical health is the same and her mental health is the same.    Depression Screen:  PHQ-2/PHQ-9 Depression Screening 3/26/2019   Little interest or pleasure in doing things 0   Feeling down, depressed, or hopeless 0   Total Score 0       Past Medical/Family/Social History:  The following portions of the patient's history were reviewed and updated as appropriate: allergies, current medications, past family history, past medical history, past social history, past surgical history and problem list.    Allergies   Allergen Reactions   • Elavil [Amitriptyline Hcl] Dizziness   • Norvasc [Amlodipine Besylate] Swelling     Ankles swelling         Current Outpatient Medications:   •  ezetimibe (ZETIA) 10 MG tablet, Take 1 tablet by mouth Daily., Disp: 90 tablet, Rfl: 3  •  hydrochlorothiazide (HYDRODIURIL) 25 MG tablet, Take 1 tablet by mouth Daily., Disp: 90 tablet, Rfl: 1  •  ibuprofen (ADVIL,MOTRIN) 800 MG tablet, Take 1 tablet by mouth 3 (Three) Times a Day As Needed for Mild Pain ., Disp: 90 tablet, Rfl: 5  •  magnesium gluconate (MAGONATE) 500 MG tablet, Take 27 mg by mouth 2 (Two) Times a Day., Disp: , Rfl:   •  Vitamin D, Cholecalciferol, 1000 units capsule, Take 5,000 Units by mouth., Disp: , Rfl:   •  irbesartan (AVAPRO) 300 MG tablet, Take 1 tablet by mouth Every Morning., Disp: 30 tablet, Rfl: 5  •  rosuvastatin (CRESTOR) 5 MG tablet, Take 1 tablet by mouth Daily., Disp: 90 tablet, Rfl: 3    Aspirin use counseling: Does not need ASA (and currently is not on it)    Current medication list contains no high risk medications.  No harmful drug interactions have been identified.     Family History   Problem Relation Age of Onset   • COPD Mother    • Heart disease Father    • Kidney disease Father    • Vision loss Father    • Cancer Father    • Diabetes Father    • Stroke  "Brother    • Hypertension Brother    • Coronary artery disease Brother         stent   • Peripheral vascular disease Brother         stent in the carotid       Social History     Tobacco Use   • Smoking status: Former Smoker     Types: Cigarettes     Last attempt to quit: 2004     Years since quitting: 15.2   • Smokeless tobacco: Never Used   Substance Use Topics   • Alcohol use: Yes     Frequency: Monthly or less     Drinks per session: 1 or 2     Comment: 0-1 per month       Past Surgical History:   Procedure Laterality Date   • BILATERAL BREAST REDUCTION Bilateral    •  SECTION     •  SECTION     • CHOLECYSTECTOMY  2014   • COLONOSCOPY  2004    Dr. Serna       Patient Active Problem List   Diagnosis   • Anxiety and depression   • Mixed hyperlipidemia   • Essential hypertension   • Adiposity   • B12 deficiency   • Avitaminosis D   • Routine adult health maintenance   • Arthritis   • Uncontrolled hypertension   • Medicare welcome exam       Review of Systems    Objective     Vitals:    19 1114   BP: (!) 182/112   BP Location: Left arm   Patient Position: Sitting   Pulse: 69   SpO2: 97%   Weight: 82.6 kg (182 lb)   Height: 154.9 cm (61\")   PainSc: 0-No pain       Patient's Body mass index is 34.39 kg/m². BMI is above normal parameters. Recommendations include: exercise counseling and nutrition counseling.      No exam data present    The patient has no evidence of cognitve impairment.     Physical Exam   Constitutional: She appears well-developed and well-nourished.   HENT:   Mouth/Throat: Oropharynx is clear and moist.   Eyes: Conjunctivae are normal.   Neck: Carotid bruit is not present.   Cardiovascular: Normal rate.   Pulmonary/Chest: Effort normal.   Abdominal: Soft.   Neurological: She is alert.   Psychiatric: She has a normal mood and affect.   Nursing note and vitals reviewed.         ECG 12 Lead  Date/Time: 3/26/2019 12:01 PM  Performed by: John Parker " MD  Authorized by: John Parker MD   Comparison: not compared with previous ECG   Rhythm: sinus rhythm  Rate: normal  Conduction: conduction normal  T flattening: V3 and V4  QRS axis: normal  Other findings: non-specific ST-T wave changes    Clinical impression: non-specific ECG            Recent Lab Results:  Lab Results   Component Value Date     (H) 03/13/2019     Lab Results   Component Value Date    TRIG 109 03/13/2019    HDL 64 (H) 03/13/2019    VLDL 21.8 03/13/2019    LDLHDL 3.63 03/13/2019     Results for orders placed or performed in visit on 03/12/19   Lipid Panel With LDL / HDL Ratio   Result Value Ref Range    Total Cholesterol 318 (H) 0 - 200 mg/dL    Triglycerides 109 0 - 150 mg/dL    HDL Cholesterol 64 (H) 40 - 60 mg/dL    VLDL Cholesterol 21.8 5 - 40 mg/dL    LDL Cholesterol  232 (H) 0 - 100 mg/dL    LDL/HDL Ratio 3.63    Urinalysis With Microscopic If Indicated (No Culture) - Urine, Clean Catch   Result Value Ref Range    Specific Gravity, UA 1.025 1.005 - 1.030    pH, UA 5.5 5.0 - 8.0    Color, UA Yellow     Appearance, UA Clear Clear    Leukocytes, UA See below: (A) Negative    Protein Negative Negative    Glucose, UA Negative Negative    Ketones Negative Negative    Blood, UA Negative Negative    Bilirubin, UA Negative Negative    Urobilinogen, UA Comment     Nitrite, UA Negative Negative   TSH   Result Value Ref Range    TSH 2.410 0.270 - 4.200 mIU/mL   CBC (No Diff)   Result Value Ref Range    WBC 6.45 3.40 - 10.80 10*3/mm3    RBC 5.12 3.77 - 5.28 10*6/mm3    Hemoglobin 14.3 12.0 - 15.9 g/dL    Hematocrit 45.4 34.0 - 46.6 %    MCV 88.7 79.0 - 97.0 fL    MCH 27.9 26.6 - 33.0 pg    MCHC 31.5 31.5 - 35.7 g/dL    RDW 14.3 12.3 - 15.4 %    Platelets 217 140 - 450 10*3/mm3   Comprehensive Metabolic Panel   Result Value Ref Range    Glucose 109 (H) 65 - 99 mg/dL    BUN 17 8 - 23 mg/dL    Creatinine 0.82 0.57 - 1.00 mg/dL    eGFR Non African Am 70 >60 mL/min/1.73    eGFR African Am 85 >60  mL/min/1.73    BUN/Creatinine Ratio 20.7 7.0 - 25.0    Sodium 140 136 - 145 mmol/L    Potassium 4.6 3.5 - 5.2 mmol/L    Chloride 102 98 - 107 mmol/L    Total CO2 27.1 22.0 - 29.0 mmol/L    Calcium 9.6 8.6 - 10.5 mg/dL    Total Protein 6.7 6.0 - 8.5 g/dL    Albumin 4.30 3.50 - 5.20 g/dL    Globulin 2.4 gm/dL    A/G Ratio 1.8 g/dL    Total Bilirubin 0.4 0.1 - 1.2 mg/dL    Alkaline Phosphatase 120 (H) 39 - 117 U/L    AST (SGOT) 26 1 - 32 U/L    ALT (SGPT) 43 (H) 1 - 33 U/L   Microscopic Examination -   Result Value Ref Range    WBC, UA 21-30 (A) /HPF    RBC, UA 0-2 /HPF    Epithelial Cells (non renal) 3-6 (A) /HPF    Cast Type Comment     Bacteria, UA 1+ (A) None Seen /HPF       Assessment/Plan   Age-appropriate Screening Schedule:  Refer to the list below for future screening recommendations based on patient's age, sex and/or medical conditions.      Health Maintenance   Topic Date Due   • ZOSTER VACCINE (2 of 3) 03/12/2015   • COLONOSCOPY  03/04/2016   • INFLUENZA VACCINE  08/01/2018   • PNEUMOCOCCAL VACCINES (65+ LOW/MEDIUM RISK) (1 of 2 - PCV13) 02/02/2019   • LIPID PANEL  03/13/2020   • MAMMOGRAM  09/04/2020   • PAP SMEAR  09/04/2021   • TDAP/TD VACCINES (2 - Td) 07/15/2023       Medicare Risks and Personalized Health Plan:  Cardiovascular risk;  Patient advised to follow heart healthy diet to help decrease cardiovascular risk   Diabetic Lab screening is due;   Glucose quantitative ordered  Obesity/Overweight condition;  Diet information included in the after visit summary (AVS)  Urinary Incontinence-patient reported;   ,  Immunizations Discussed/Encouraged (specific immunizations; Prevnar )      CMS-Preventive Services Quick Reference  Medicare Preventive Services Addressed:  Annual Wellness Visit (AWV)  Cardiovascular Disease Screening Tests (may do this order every 5 years in beneficiaries without signs or symptoms of cardiovascular disease)  Colorectal Cancer Screening, Colonoscopy  Diabetes Screening-Lab  Order for either glucose quantitative blood (except reagent strip), glucose;post glucose dose(includes glucose), or glucose tolerance test-3 specimens(includes glucose)  Pneumococcal Vaccine and Administration    Advance Care Planning:  Patient does not have an advance directive - not interested in additional information    Diagnoses and all orders for this visit:    1. Essential hypertension (Primary)  -     irbesartan (AVAPRO) 300 MG tablet; Take 1 tablet by mouth Every Morning.  Dispense: 30 tablet; Refill: 5  -     ECG 12 Lead    2. Mixed hyperlipidemia  -     rosuvastatin (CRESTOR) 5 MG tablet; Take 1 tablet by mouth Daily.  Dispense: 90 tablet; Refill: 3  -     ezetimibe (ZETIA) 10 MG tablet; Take 1 tablet by mouth Daily.  Dispense: 90 tablet; Refill: 3    3. Routine adult health maintenance    4. Uncontrolled hypertension    5. Need for vaccination for pneumococcus  -     Pneumococcal Conjugate Vaccine 13-Valent All    6. Screen for colon cancer  -     Amb referral for Screening Colonoscopy    7. Medicare welcome exam    she stopped her Norvasc 10 due to ankle swelling, bp is giuliano high today  Was on Diovan pre recall.    Chol is still very high, willing to try 5 of Crestor.    An After Visit Summary and PPPS with all of these plans were given to the patient.      Follow Up:  Return in about 1 month (around 4/26/2019) for blood pressure, new medication follow up.

## 2019-04-29 ENCOUNTER — OFFICE VISIT (OUTPATIENT)
Dept: FAMILY MEDICINE CLINIC | Facility: CLINIC | Age: 64
End: 2019-04-29

## 2019-04-29 ENCOUNTER — TELEPHONE (OUTPATIENT)
Dept: SURGERY | Facility: CLINIC | Age: 64
End: 2019-04-29

## 2019-04-29 VITALS
BODY MASS INDEX: 34.93 KG/M2 | HEART RATE: 65 BPM | OXYGEN SATURATION: 98 % | HEIGHT: 61 IN | WEIGHT: 185 LBS | DIASTOLIC BLOOD PRESSURE: 74 MMHG | SYSTOLIC BLOOD PRESSURE: 138 MMHG

## 2019-04-29 DIAGNOSIS — I10 ESSENTIAL HYPERTENSION: ICD-10-CM

## 2019-04-29 PROCEDURE — 99213 OFFICE O/P EST LOW 20 MIN: CPT | Performed by: FAMILY MEDICINE

## 2019-04-29 RX ORDER — HYDROCHLOROTHIAZIDE 25 MG/1
25 TABLET ORAL DAILY
Qty: 90 TABLET | Refills: 1 | Status: SHIPPED | OUTPATIENT
Start: 2019-04-29 | End: 2019-11-04 | Stop reason: SDUPTHER

## 2019-04-29 RX ORDER — IRBESARTAN 300 MG/1
300 TABLET ORAL EVERY MORNING
Qty: 90 TABLET | Refills: 3 | Status: SHIPPED | OUTPATIENT
Start: 2019-04-29 | End: 2019-12-09 | Stop reason: RX

## 2019-04-29 NOTE — PROGRESS NOTES
Chief Complaint   Patient presents with   • Hypertension       Subjective    We had to stop Norvasc 10 last month because of ankle edema    Patient here for follow-up of elevated blood pressure.    She is not exercising and is not adherent to a low-salt diet.    Blood pressure is well controlled at home.   Cardiac symptoms: none.   Patient denies: chest pressure/discomfort, claudication, cough, dyspnea, exertional chest pressure/discomfort, fatigue, irregular heart beat, lower extremity edema, near-syncope, orthopnea, palpitations, paroxysmal nocturnal dyspnea, syncope and tachypnea.   Cardiovascular risk factors: advanced age (older than 55 for men, 65 for women), dyslipidemia and hypertension.   Use of agents associated with hypertension: none.   History of target organ damage: none.  Patient is taking prescribed hypertension medications as prescribed without side effects.    The following portions of the patient's history were reviewed and updated as appropriate: allergies, current medications, past family history, past medical history, past social history, past surgical history and problem list.    Review of Systems  Pertinent items are noted in HPI.    Results for orders placed or performed in visit on 03/12/19   Lipid Panel With LDL / HDL Ratio   Result Value Ref Range    Total Cholesterol 318 (H) 0 - 200 mg/dL    Triglycerides 109 0 - 150 mg/dL    HDL Cholesterol 64 (H) 40 - 60 mg/dL    VLDL Cholesterol 21.8 5 - 40 mg/dL    LDL Cholesterol  232 (H) 0 - 100 mg/dL    LDL/HDL Ratio 3.63    Urinalysis With Microscopic If Indicated (No Culture) - Urine, Clean Catch   Result Value Ref Range    Specific Gravity, UA 1.025 1.005 - 1.030    pH, UA 5.5 5.0 - 8.0    Color, UA Yellow     Appearance, UA Clear Clear    Leukocytes, UA See below: (A) Negative    Protein Negative Negative    Glucose, UA Negative Negative    Ketones Negative Negative    Blood, UA Negative Negative    Bilirubin, UA Negative Negative     "Urobilinogen, UA Comment     Nitrite, UA Negative Negative   TSH   Result Value Ref Range    TSH 2.410 0.270 - 4.200 mIU/mL   CBC (No Diff)   Result Value Ref Range    WBC 6.45 3.40 - 10.80 10*3/mm3    RBC 5.12 3.77 - 5.28 10*6/mm3    Hemoglobin 14.3 12.0 - 15.9 g/dL    Hematocrit 45.4 34.0 - 46.6 %    MCV 88.7 79.0 - 97.0 fL    MCH 27.9 26.6 - 33.0 pg    MCHC 31.5 31.5 - 35.7 g/dL    RDW 14.3 12.3 - 15.4 %    Platelets 217 140 - 450 10*3/mm3   Comprehensive Metabolic Panel   Result Value Ref Range    Glucose 109 (H) 65 - 99 mg/dL    BUN 17 8 - 23 mg/dL    Creatinine 0.82 0.57 - 1.00 mg/dL    eGFR Non African Am 70 >60 mL/min/1.73    eGFR African Am 85 >60 mL/min/1.73    BUN/Creatinine Ratio 20.7 7.0 - 25.0    Sodium 140 136 - 145 mmol/L    Potassium 4.6 3.5 - 5.2 mmol/L    Chloride 102 98 - 107 mmol/L    Total CO2 27.1 22.0 - 29.0 mmol/L    Calcium 9.6 8.6 - 10.5 mg/dL    Total Protein 6.7 6.0 - 8.5 g/dL    Albumin 4.30 3.50 - 5.20 g/dL    Globulin 2.4 gm/dL    A/G Ratio 1.8 g/dL    Total Bilirubin 0.4 0.1 - 1.2 mg/dL    Alkaline Phosphatase 120 (H) 39 - 117 U/L    AST (SGOT) 26 1 - 32 U/L    ALT (SGPT) 43 (H) 1 - 33 U/L   Microscopic Examination -   Result Value Ref Range    WBC, UA 21-30 (A) /HPF    RBC, UA 0-2 /HPF    Epithelial Cells (non renal) 3-6 (A) /HPF    Cast Type Comment     Bacteria, UA 1+ (A) None Seen /HPF        Vitals:    04/29/19 1257   BP: 138/74   Pulse: 65   SpO2: 98%   Weight: 83.9 kg (185 lb)   Height: 154.9 cm (61\")     BP Readings from Last 3 Encounters:   04/29/19 138/74   03/26/19 (!) 182/112   09/21/18 158/100     Objective      Gen: alert, pleasant.  HEENT: PERRL, EOMI intact, lids ok, ear canals clear, TMs normal, throat clear, nostrils normal  Neck: no bruit, no enlarged thyroid  Lungs: CTA  Heart: RR no murmur  ABD: soft , + BS  Pulses: intact  Mood: stable     Assessment/Plan   Hypertension, normal blood pressure Evidence of target organ damage: none.    Nery was seen today for " hypertension.    Diagnoses and all orders for this visit:    Essential hypertension  -     irbesartan (AVAPRO) 300 MG tablet; Take 1 tablet by mouth Every Morning.  -     hydrochlorothiazide (HYDRODIURIL) 25 MG tablet; Take 1 tablet by mouth Daily.    no muscle aches on crestor  Medication: no change.  Follow up: 6 months and as needed.    There are no Patient Instructions on file for this visit.  Medications Discontinued During This Encounter   Medication Reason   • irbesartan (AVAPRO) 300 MG tablet Reorder   • hydrochlorothiazide (HYDRODIURIL) 25 MG tablet Reorder        Return in about 6 months (around 10/29/2019) for blood pressure.    Limit salt  Limit alcoholic drinks to 1 a day  Limit caffeine to 1-2 servings a day    Dr. John Parker MD  [place], [place]  [facility].

## 2019-05-02 ENCOUNTER — PREP FOR SURGERY (OUTPATIENT)
Dept: OTHER | Facility: HOSPITAL | Age: 64
End: 2019-05-02

## 2019-05-02 DIAGNOSIS — Z12.11 SCREEN FOR COLON CANCER: Primary | ICD-10-CM

## 2019-05-06 PROBLEM — Z12.11 SCREEN FOR COLON CANCER: Status: ACTIVE | Noted: 2019-05-06

## 2019-05-06 NOTE — TELEPHONE ENCOUNTER
SPOKE WITH PATIENT.  SCHEDULED [place] 07/17/2019 AT 2PM - ARRIVE 1PM.  E-MAILED INSTRUCTIONS [email or web address] TODAY.

## 2019-07-16 ENCOUNTER — ANESTHESIA EVENT (OUTPATIENT)
Dept: PERIOP | Facility: HOSPITAL | Age: 64
End: 2019-07-16

## 2019-07-17 ENCOUNTER — HOSPITAL ENCOUNTER (OUTPATIENT)
Facility: HOSPITAL | Age: 64
Setting detail: HOSPITAL OUTPATIENT SURGERY
Discharge: HOME | End: 2019-07-17
Attending: INTERNAL MEDICINE | Admitting: INTERNAL MEDICINE

## 2019-07-17 ENCOUNTER — ANESTHESIA (OUTPATIENT)
Dept: PERIOP | Facility: HOSPITAL | Age: 64
End: 2019-07-17

## 2019-07-17 VITALS
SYSTOLIC BLOOD PRESSURE: 177 MMHG | TEMPERATURE: 98 F | DIASTOLIC BLOOD PRESSURE: 82 MMHG | OXYGEN SATURATION: 97 % | HEART RATE: 73 BPM | WEIGHT: 178.8 LBS | RESPIRATION RATE: 15 BRPM | BODY MASS INDEX: 33.78 KG/M2

## 2019-07-17 DIAGNOSIS — K56.699 COLON STRICTURE (HCC): Primary | ICD-10-CM

## 2019-07-17 PROCEDURE — G0121 COLON CA SCRN NOT HI RSK IND: HCPCS | Performed by: INTERNAL MEDICINE

## 2019-07-17 PROCEDURE — 25010000002 PROPOFOL 10 MG/ML EMULSION: Performed by: NURSE ANESTHETIST, CERTIFIED REGISTERED

## 2019-07-17 RX ORDER — GLYCOPYRROLATE 0.2 MG/ML
INJECTION INTRAMUSCULAR; INTRAVENOUS AS NEEDED
Status: DISCONTINUED | OUTPATIENT
Start: 2019-07-17 | End: 2019-07-17 | Stop reason: SURG

## 2019-07-17 RX ORDER — LIDOCAINE HYDROCHLORIDE 20 MG/ML
INJECTION, SOLUTION INFILTRATION; PERINEURAL AS NEEDED
Status: DISCONTINUED | OUTPATIENT
Start: 2019-07-17 | End: 2019-07-17 | Stop reason: SURG

## 2019-07-17 RX ORDER — LIDOCAINE HYDROCHLORIDE 10 MG/ML
0.5 INJECTION, SOLUTION EPIDURAL; INFILTRATION; INTRACAUDAL; PERINEURAL ONCE AS NEEDED
Status: DISCONTINUED | OUTPATIENT
Start: 2019-07-17 | End: 2019-07-17 | Stop reason: HOSPADM

## 2019-07-17 RX ORDER — SODIUM CHLORIDE, SODIUM LACTATE, POTASSIUM CHLORIDE, CALCIUM CHLORIDE 600; 310; 30; 20 MG/100ML; MG/100ML; MG/100ML; MG/100ML
9 INJECTION, SOLUTION INTRAVENOUS CONTINUOUS
Status: DISCONTINUED | OUTPATIENT
Start: 2019-07-17 | End: 2019-07-17 | Stop reason: HOSPADM

## 2019-07-17 RX ORDER — SODIUM CHLORIDE 9 MG/ML
40 INJECTION, SOLUTION INTRAVENOUS AS NEEDED
Status: DISCONTINUED | OUTPATIENT
Start: 2019-07-17 | End: 2019-07-17 | Stop reason: HOSPADM

## 2019-07-17 RX ORDER — SODIUM CHLORIDE 0.9 % (FLUSH) 0.9 %
3 SYRINGE (ML) INJECTION EVERY 12 HOURS SCHEDULED
Status: DISCONTINUED | OUTPATIENT
Start: 2019-07-17 | End: 2019-07-17 | Stop reason: HOSPADM

## 2019-07-17 RX ORDER — SODIUM CHLORIDE 0.9 % (FLUSH) 0.9 %
1-10 SYRINGE (ML) INJECTION AS NEEDED
Status: DISCONTINUED | OUTPATIENT
Start: 2019-07-17 | End: 2019-07-17 | Stop reason: HOSPADM

## 2019-07-17 RX ORDER — PROPOFOL 10 MG/ML
VIAL (ML) INTRAVENOUS AS NEEDED
Status: DISCONTINUED | OUTPATIENT
Start: 2019-07-17 | End: 2019-07-17 | Stop reason: SURG

## 2019-07-17 RX ADMIN — PROPOFOL 50 MG: 10 INJECTION, EMULSION INTRAVENOUS at 15:13

## 2019-07-17 RX ADMIN — PROPOFOL 50 MG: 10 INJECTION, EMULSION INTRAVENOUS at 14:58

## 2019-07-17 RX ADMIN — GLYCOPYRROLATE 0.2 MG: 0.2 INJECTION INTRAMUSCULAR; INTRAVENOUS at 15:08

## 2019-07-17 RX ADMIN — PROPOFOL 50 MG: 10 INJECTION, EMULSION INTRAVENOUS at 15:09

## 2019-07-17 RX ADMIN — SODIUM CHLORIDE, POTASSIUM CHLORIDE, SODIUM LACTATE AND CALCIUM CHLORIDE: 600; 310; 30; 20 INJECTION, SOLUTION INTRAVENOUS at 14:24

## 2019-07-17 RX ADMIN — PROPOFOL 50 MG: 10 INJECTION, EMULSION INTRAVENOUS at 15:20

## 2019-07-17 RX ADMIN — PROPOFOL 50 MG: 10 INJECTION, EMULSION INTRAVENOUS at 15:01

## 2019-07-17 RX ADMIN — GLYCOPYRROLATE 0.2 MG: 0.2 INJECTION INTRAMUSCULAR; INTRAVENOUS at 15:04

## 2019-07-17 RX ADMIN — PROPOFOL 50 MG: 10 INJECTION, EMULSION INTRAVENOUS at 15:03

## 2019-07-17 RX ADMIN — LIDOCAINE HYDROCHLORIDE 100 MG: 20 INJECTION, SOLUTION INFILTRATION; PERINEURAL at 14:57

## 2019-07-17 RX ADMIN — PROPOFOL 50 MG: 10 INJECTION, EMULSION INTRAVENOUS at 14:59

## 2019-07-17 NOTE — ANESTHESIA PREPROCEDURE EVALUATION
Anesthesia Evaluation     Patient summary reviewed and Nursing notes reviewed   NPO Solid Status: > 6 hours  NPO Liquid Status: > 8 hours           Airway   Mallampati: I  TM distance: >3 FB  Neck ROM: full  Dental      Comment: Upper teeth crowns, lower teeth implants    Pulmonary - negative pulmonary ROS and normal exam    breath sounds clear to auscultation  Cardiovascular - normal exam  Exercise tolerance: good (4-7 METS)    Rhythm: regular  Rate: normal    (+) hypertension well controlled, hyperlipidemia,       Neuro/Psych  (+) psychiatric history Anxiety and Depression,     GI/Hepatic/Renal/Endo    (+) obesity,  GERD (pepcid as needed) well controlled,    (-) morbid obesity    Musculoskeletal     Abdominal    Substance History      OB/GYN          Other   (+) arthritis     ROS/Med Hx Other: Pt says she is slow to wake up.                Anesthesia Plan    ASA 2     MAC   (Discussed risks with pt and she understands and is in agreement to proceed with anesthesia.)  intravenous induction   Anesthetic plan, all risks, benefits, and alternatives have been provided, discussed and informed consent has been obtained with: patient.  Use of blood products discussed with patient  Consented to blood products.

## 2019-07-17 NOTE — H&P
Patient Care Team:  John Parker MD as PCP - General  [facility], Luz Haque MD as Consulting Physician (Obstetrics and Gynecology)  gIgy Serna MD as Consulting Physician (Gastroenterology)    CHIEF COMPLAINT: Screening for Colon Cancer    HISTORY OF PRESENT ILLNESS:    Last exam       Past Medical History:   Diagnosis Date   • Anxiety    • Depression    • Elevated cholesterol    • GERD (gastroesophageal reflux disease)    • History of cardiac catheterization 2014    Normal   • Hyperlipidemia    • Hypertension    • Obesity    • Post-menopausal    • Vitamin B 12 deficiency    • Vitamin D insufficiency      Past Surgical History:   Procedure Laterality Date   • BILATERAL BREAST REDUCTION Bilateral    •  SECTION     •  SECTION     • CHOLECYSTECTOMY  2014   • COLONOSCOPY  2004    Dr. Serna     Family History   Problem Relation Age of Onset   • COPD Mother    • Heart disease Father    • Kidney disease Father    • Vision loss Father    • Cancer Father    • Diabetes Father    • Stroke Brother    • Hypertension Brother    • Coronary artery disease Brother         stent   • Peripheral vascular disease Brother         stent in the carotid     Social History     Tobacco Use   • Smoking status: Former Smoker     Types: Cigarettes     Last attempt to quit: 2004     Years since quitting: 15.5   • Smokeless tobacco: Never Used   Substance Use Topics   • Alcohol use: Yes     Frequency: Monthly or less     Drinks per session: 1 or 2     Comment: 0-1 per month   • Drug use: No     Medications Prior to Admission   Medication Sig Dispense Refill Last Dose   • ezetimibe (ZETIA) 10 MG tablet Take 1 tablet by mouth Daily. 90 tablet 3 2019 at 2300   • ibuprofen (ADVIL,MOTRIN) 800 MG tablet Take 1 tablet by mouth 3 (Three) Times a Day As Needed for Mild Pain . 90 tablet 5 2019 at Unknown time   • irbesartan (AVAPRO) 300 MG tablet Take 1 tablet by mouth Every  Morning. 90 tablet 3 7/17/2019 at 1000   • rosuvastatin (CRESTOR) 5 MG tablet Take 1 tablet by mouth Daily. 90 tablet 3 7/16/2019 at 2100   • Vitamin D, Cholecalciferol, 1000 units capsule Take 5,000 Units by mouth.   7/16/2019 at 1000   • hydrochlorothiazide (HYDRODIURIL) 25 MG tablet Take 1 tablet by mouth Daily. 90 tablet 1 7/15/2019   • magnesium gluconate (MAGONATE) 500 MG tablet Take 27 mg by mouth 2 (Two) Times a Day.   7/14/2019 at 2100     Allergies:  Elavil [amitriptyline hcl] and Norvasc [amlodipine besylate]    REVIEW OF SYSTEMS:  Please see the above history of present illness for pertinent positives and negatives.  The remainder of the patient's systems have been reviewed and are negative.     Vital Signs  Temp:  [98 °F (36.7 °C)] 98 °F (36.7 °C)  Heart Rate:  [71] 71  Resp:  [15] 15  BP: (156)/(86) 156/86    Flowsheet Rows      First Filed Value   Admission Height  --   Admission Weight  81.1 kg (178 lb 12.8 oz) Documented at 07/17/2019 1404           Physical Exam:  Physical Exam   Constitutional: Patient appears well-developed and well-nourished and in no acute distress   HEENT:   Head: Normocephalic and atraumatic.   Eyes:  Pupils are equal, round, and reactive to light. EOM are intact. Sclerae are anicteric and non-injected.  Mouth and Throat: Patient has moist mucous membranes. Oropharynx is clear of any erythema or exudate.     Neck: Neck supple. No JVD present. No thyromegaly present. No lymphadenopathy present.  Cardiovascular: Regular rate, regular rhythm, S1 normal and S2 normal.  Exam reveals no gallop and no friction rub.  No murmur heard.  Pulmonary/Chest: Lungs are clear to auscultation bilaterally. No respiratory distress. No wheezes. No rhonchi. No rales.   Abdominal: Soft. Bowel sounds are normal. No distension and no mass. There is no hepatosplenomegaly. There is no tenderness.   Musculoskeletal: Normal Muscle tone  Extremities: No edema. Pulses are palpable in all 4  extremities.  Neurological: Patient is alert and oriented to person, place, and time. Cranial nerves II-XII are grossly intact with no focal deficits.  Skin: Skin is warm. No rash noted. Nails show no clubbing.  No cyanosis or erythema.    Debilities/Disabilities Identified: None  Emotional Behavior: Appropriate     Results Review:    I reviewed the patient's new clinical results.  Lab Results (most recent)     None          Imaging Results (most recent)     None        reviewed    ECG/EMG Results (most recent)     None        reviewed    Assessment/Plan     Screening for Colon Cancer / Colonoscopy    I discussed the patients findings and my recommendations with patient.     Iggy Serna MD  07/17/19  2:35 PM    Time: 10 min prior to procedure.

## 2019-07-17 NOTE — OP NOTE
COLONOSCOPY  Procedure Report    Patient Name:  Nery Jarrell  YOB: 1955    Date of Surgery:  7/17/2019     Indications:  Screening for Colon Cancer    Pre-op Diagnosis:   Screen for colon cancer [Z12.11]    Post-Op Diagnosis Codes:     * Screen for colon cancer [Z12.11]     * Female pelvic peritoneal adhesion [N73.6]     * Diverticulosis large intestine w/o perforation or abscess w/o bleeding [K57.30]         Procedure/CPT® Codes:      Procedure(s):  COLONOSCOPY    Staff:  Surgeon(s):  Iggy Serna MD         Anesthesia: Monitor Anesthesia Care    Estimated Blood Loss: none    Specimens: None    Implants:    Nothing was implanted during the procedure      Description of Procedure:  After having signed informed consent, she was brought to the endoscopy suite, placed in the left lateral decubitus position and given her IV sedation.  Rectal exam revealed some external skin tags, but normal anal tone and no rectal mass.  The scope was introduced into the rectum and advanced under direct visualization from the rectum to the sigmoid colon.  The sigmoid colon had multiple diverticular openings noted and was subjectively fixed.  The scope was advanced into the sigmoid colon.  The scope could not be reduced.  Using abdominal pressure then in the pelvis, the scope could be advanced to an area of spasm/stricture within the diverticulosis.  There was no acute erythema, swelling and no bleeding was seen throughout.  However, it was significantly difficult to pass through this area at 20-25 cm; however, with abdominal splinting, this was eventually achieved.  There was significant bradycardia.  The patient had been given Robinul to maintain her pulse rate while this scope was passed through this area.  The scope was able to be passed through the remainder of the sigmoid and descending colon, to and around the splenic flexure, reduced, and still would not pass easily through the transverse colon  without using abdominal splinting.  With abdominal splinting, it was successfully passed to and around the hepatic flexure, reduced in the ascending colon, and advanced into the cecum.  The cecum was identified by appendiceal orifice and ileocecal valve.  It was well-prepped and normal appearing.  The ileocecal valve was intubated.  The terminal ileum was normal in appearance.  The scope was withdrawn back into the ascending colon, withdrawn slowly to examine the colon in a sequential fashion.  There were no mucosal lesions noted throughout the entire exam.  Diverticula were limited in the sigmoid colon in the area of presumed diverticular stricture/pelvic adhesions, was between 20-25 cm. The scope was elevated and withdrawn in the distal rectum in retroflexion revealing an intact dentate line and no mucosal lesions.  The scope was de-retroflexed and withdrawn.  Patient tolerated this difficult procedure well.                           Findings: Difficult Colon to TI Good Prep  Diverticulosis  Sigmoid Stricture  Pelvic Adhesions      Complications: None    Recommendations: Will refer to Dr Krishnan for evaluation for possible sigmoid resection      Iggy Serna MD     Date: 7/17/2019  Time: 3:30 PM

## 2019-07-17 NOTE — BRIEF OP NOTE
COLONOSCOPY  Progress Note    Nery Jarrell  7/17/2019    Pre-op Diagnosis:   Screen for colon cancer [Z12.11]       Post-Op Diagnosis Codes:     * Screen for colon cancer [Z12.11]     * Female pelvic peritoneal adhesion [N73.6]     * Diverticulosis large intestine w/o perforation or abscess w/o bleeding [K57.30]    Procedure/CPT® Codes:      Procedure(s):  COLONOSCOPY    Surgeon(s):  Iggy Serna MD    Anesthesia: Monitor Anesthesia Care    Staff:   Circulator: Alba French RN; Jewell Vick RN  Scrub Person: Ele Napier    Estimated Blood Loss: none    Urine Voided: * No values recorded between 7/17/2019  2:45 PM and 7/17/2019  3:26 PM *    Specimens:                None          Drains:      Findings: Colon to TI-Difficult  Sigmoid Diverticulosis/Stricture  Pelvic Adhesion    Complications: None      Iggy Serna MD     Date: 7/17/2019  Time: 3:28 PM

## 2019-08-16 ENCOUNTER — OFFICE VISIT (OUTPATIENT)
Dept: SURGERY | Facility: CLINIC | Age: 64
End: 2019-08-16

## 2019-08-16 VITALS
TEMPERATURE: 97.9 F | SYSTOLIC BLOOD PRESSURE: 142 MMHG | OXYGEN SATURATION: 98 % | WEIGHT: 181 LBS | DIASTOLIC BLOOD PRESSURE: 94 MMHG | BODY MASS INDEX: 34.17 KG/M2 | HEIGHT: 61 IN | HEART RATE: 70 BPM

## 2019-08-16 DIAGNOSIS — K56.699 COLON STRICTURE (HCC): Primary | ICD-10-CM

## 2019-08-16 PROCEDURE — 99204 OFFICE O/P NEW MOD 45 MIN: CPT | Performed by: COLON & RECTAL SURGERY

## 2019-08-16 NOTE — PROGRESS NOTES
Nery Jarrell is a 64 y.o. female who is seen as a consult at the request of Iggy Clements for abnormal colonoscopy.      HPI:    Pt had colonoscopy with Dr. Sotelo last month.  He noted stricture at 20-25cm; was able to pass .  Tics.  Otherwise wnl    She thinks has had diverticulitis around .  She had severe abdominal pain and went to ED  In February and , she had 2 more bad bouts of abdominal pain, which she treated at home with ibuprofen and aspirin.    She does not have a difficult time with her BMs  She has 2-3 BMs per day  Stool consistency mushy    Lap breana Dr. Padilla   ERCP Dr. Gutiérrez     Past Medical History:   Diagnosis Date   • Abdominal wall mass 2014   • Abnormal mammogram 2018   • Abnormal mammogram 2015   • Anxiety    • Arthritis    • Colon polyps    • Colon stricture (CMS/HCC) 2019   • Depression    • Foot deformity    • GERD (gastroesophageal reflux disease)    • Hematuria    • Hyperlipidemia     MIXED   • Hypertension    • Left breast mass 2015   • Myalgia    • OA (osteoarthritis)    • Obesity    • Post-menopausal    • Vitamin B 12 deficiency    • Vitamin D insufficiency        Past Surgical History:   Procedure Laterality Date   • BILATERAL BREAST REDUCTION Bilateral    • CARDIAC CATHETERIZATION Right 2014    MILD NONOBSTRUCTIVE CAD, NORMAL LEFT VENTRICULAR SYSTOLIC FUNCTION, LEFT VENTRICULAR HYPERTROPHY, DR. LESTER HECK AT [facility]   •  SECTION N/A 1976   •  SECTION N/A 1979   • CHOLECYSTECTOMY N/A 2014    LAPAROSCOPIC WITH CHOLANGIOGRAM, DR. TRAY NARVAEZ AT [facility]   • COLONOSCOPY N/A 2003    GOOD PREP, NONSPECIFIC ILEITIS, SIGMOID DIVERTICULOSIS, DR. IGGY SOTELO AT [facility]   • COLONOSCOPY N/A 2019    SIGMOID STRICTURE, DIVERTICULOSIS, PELVIC ADHESIONS, DR. IGGY SOTELO AT [facility]   • COLONOSCOPY W/ POLYPECTOMY N/A 2003    LARGE INTERNAL HEMORRHOIDS, 3 MM HYPERPLASTIC  POLYP IN RECTUM, SCATTERED DIVERTICULOSIS, DR. ADOLFO ISLAS AT [facility]    • ERCP N/A 01/10/2014    CHOLEDOCHOLITHIASIS WAS FOUND, COMPLETE REMOVAL BY BILIARY SPHINCTEROTOMY AND BALLOON EXTRACTION, DR. MARGA MARAVILLA AT [facility]   • ERCP N/A 01/12/2014    PRIOR BILIARY SPHINCTEROTOMY APPEARED STENOSED, DR. MARGA MARAVILLA AT [facility]   • ERCP WITH FOREIGN BODY REMOVAL N/A 04/09/2014    PRIOR BILIARY SPHINCTEROTOMY APPEARD OPEN, 1 VISIBLY OCCLUDED STENT FROM THE BILIARY TREE WAS IN THE MAJOR PAPILLA, STENT REMOVED FROM BILIARY TREE, DR. MARGA MARAVILLA AT [facility]   • MOUTH SURGERY N/A 08/31/2018       Social History:   reports that she quit smoking about 15 years ago. Her smoking use included cigarettes. She quit after 11.00 years of use. She has never used smokeless tobacco. She reports that she drinks alcohol. She reports that she does not use drugs.      Marriage status:     Family History   Problem Relation Age of Onset   • COPD Mother    • Emphysema Mother    • Heart disease Mother    • Heart disease Father    • Kidney disease Father    • Vision loss Father    • Cancer Father    • Diabetes Father    • Lymphoma Father    • Stroke Brother    • Hypertension Brother    • Coronary artery disease Brother         stent   • Peripheral vascular disease Brother         stent in the carotid   • Diabetes Brother          Current Outpatient Medications:   •  ezetimibe (ZETIA) 10 MG tablet, Take 1 tablet by mouth Daily., Disp: 90 tablet, Rfl: 3  •  hydrochlorothiazide (HYDRODIURIL) 25 MG tablet, Take 1 tablet by mouth Daily., Disp: 90 tablet, Rfl: 1  •  irbesartan (AVAPRO) 300 MG tablet, Take 1 tablet by mouth Every Morning., Disp: 90 tablet, Rfl: 3  •  magnesium gluconate (MAGONATE) 500 MG tablet, Take 27 mg by mouth 2 (Two) Times a Day., Disp: , Rfl:   •  rosuvastatin (CRESTOR) 5 MG tablet, Take 1 tablet by mouth Daily., Disp: 90 tablet, Rfl: 3  •  Vitamin D, Cholecalciferol, 1000 units capsule, Take 5,000 Units by mouth., Disp: , Rfl:    •  ibuprofen (ADVIL,MOTRIN) 800 MG tablet, Take 1 tablet by mouth 3 (Three) Times a Day As Needed for Mild Pain ., Disp: 90 tablet, Rfl: 5    Allergy  Elavil [amitriptyline hcl] and Norvasc [amlodipine besylate]    Review of Systems   Constitution: Negative for decreased appetite, weakness and weight gain.   HENT: Negative for congestion, hearing loss and hoarse voice.    Eyes: Negative for blurred vision, discharge and visual disturbance.   Cardiovascular: Negative for chest pain, cyanosis and leg swelling.   Respiratory: Negative for cough, shortness of breath, sleep disturbances due to breathing and snoring.    Endocrine: Negative for cold intolerance and heat intolerance.   Hematologic/Lymphatic: Does not bruise/bleed easily.   Skin: Negative for itching, poor wound healing and skin cancer.   Musculoskeletal: Positive for arthritis, back pain, joint pain and joint swelling.   Gastrointestinal: Negative for abdominal pain, change in bowel habit, bowel incontinence and constipation.   Genitourinary: Negative for bladder incontinence, dysuria and hematuria.   Neurological: Negative for brief paralysis, excessive daytime sleepiness, dizziness, focal weakness, headaches and light-headedness.   Psychiatric/Behavioral: Negative for altered mental status and hallucinations. The patient does not have insomnia.    Allergic/Immunologic: Negative for HIV exposure and persistent infections.   All other systems reviewed and are negative.      Vitals:    08/16/19 1343   BP: 142/94   Pulse: 70   Temp: 97.9 °F (36.6 °C)   SpO2: 98%     Body mass index is 34.2 kg/m².    Physical Exam   Constitutional: She is oriented to person, place, and time. She appears well-developed and well-nourished. No distress.   HENT:   Head: Normocephalic and atraumatic.   Nose: Nose normal.   Mouth/Throat: Oropharynx is clear and moist.   Eyes: Conjunctivae and EOM are normal. Pupils are equal, round, and reactive to light.   Neck: Normal range of  motion. No tracheal deviation present.   Pulmonary/Chest: Effort normal and breath sounds normal. No respiratory distress.   Abdominal: Soft. Bowel sounds are normal. She exhibits no distension.   Musculoskeletal: Normal range of motion. She exhibits no edema or deformity.   Neurological: She is alert and oriented to person, place, and time. No cranial nerve deficit. Coordination and gait normal.   Skin: Skin is warm and dry.   Psychiatric: She has a normal mood and affect. Her behavior is normal. Judgment normal.       Review of Medical Record:  I reviewed colonoscopy with Dr. Serna July 2019; he noted stricture at 20-25cm; was able to pass .  Tics.  Otherwise wnl    I reviewed images CT a/p 2014: showed sigmoid diverticula    Assessment:  1. Colon stricture (CMS/HCC)        Plan:    I recommend CT a/p for further evaluation of colon stricture.  Pending these results, discussed with patient regarding surgery (laparoscopic sigmoid colon resection) vs. conservative observation.  Discussed if colon stricture were to worsen, could require emergency surgery for obstruction.    Will discuss further after CT resulted.      RTC 2-3 weeks      Scribed for Vidal Krishnan MD by Jordana Hay PA-C  8/16/2019   This patient was evaluated by me, recommendations made, documentation reviewed, edited, and revised by me, Vidal Krishnan MD

## 2019-08-20 ENCOUNTER — HOSPITAL ENCOUNTER (OUTPATIENT)
Dept: CT IMAGING | Facility: HOSPITAL | Age: 64
Discharge: HOME | End: 2019-08-20
Admitting: PHYSICIAN ASSISTANT

## 2019-08-20 DIAGNOSIS — K56.699 COLON STRICTURE (HCC): ICD-10-CM

## 2019-08-20 LAB — CREAT BLDA-MCNC: 0.8 MG/DL (ref 0.6–1.3)

## 2019-08-20 PROCEDURE — 74177 CT ABD & PELVIS W/CONTRAST: CPT

## 2019-08-20 PROCEDURE — 25010000002 IOPAMIDOL 61 % SOLUTION: Performed by: PHYSICIAN ASSISTANT

## 2019-08-20 PROCEDURE — 82565 ASSAY OF CREATININE: CPT

## 2019-08-20 PROCEDURE — 0 DIATRIZOATE MEGLUMINE & SODIUM PER 1 ML: Performed by: PHYSICIAN ASSISTANT

## 2019-08-20 RX ADMIN — IOPAMIDOL 85 ML: 612 INJECTION, SOLUTION INTRAVENOUS at 11:03

## 2019-08-20 RX ADMIN — DIATRIZOATE MEGLUMINE AND DIATRIZOATE SODIUM 45 ML: 600; 100 SOLUTION ORAL; RECTAL at 11:11

## 2019-09-03 ENCOUNTER — HOSPITAL ENCOUNTER (EMERGENCY)
Facility: HOSPITAL | Age: 64
Discharge: HOME | End: 2019-09-03
Attending: EMERGENCY MEDICINE | Admitting: EMERGENCY MEDICINE

## 2019-09-03 ENCOUNTER — OFFICE VISIT (OUTPATIENT)
Dept: SURGERY | Facility: CLINIC | Age: 64
End: 2019-09-03

## 2019-09-03 VITALS
HEIGHT: 61 IN | RESPIRATION RATE: 16 BRPM | BODY MASS INDEX: 33.79 KG/M2 | WEIGHT: 179 LBS | HEART RATE: 53 BPM | SYSTOLIC BLOOD PRESSURE: 183 MMHG | OXYGEN SATURATION: 98 % | TEMPERATURE: 97.8 F | DIASTOLIC BLOOD PRESSURE: 78 MMHG

## 2019-09-03 VITALS
WEIGHT: 179.3 LBS | TEMPERATURE: 98.1 F | SYSTOLIC BLOOD PRESSURE: 230 MMHG | HEIGHT: 61 IN | HEART RATE: 78 BPM | DIASTOLIC BLOOD PRESSURE: 108 MMHG | BODY MASS INDEX: 33.85 KG/M2 | OXYGEN SATURATION: 98 %

## 2019-09-03 DIAGNOSIS — I10 BENIGN ESSENTIAL HYPERTENSION: Primary | ICD-10-CM

## 2019-09-03 DIAGNOSIS — K56.699 COLON STRICTURE (HCC): Primary | ICD-10-CM

## 2019-09-03 DIAGNOSIS — I16.0 HYPERTENSIVE URGENCY: ICD-10-CM

## 2019-09-03 LAB
ANION GAP SERPL CALCULATED.3IONS-SCNC: 13.5 MMOL/L (ref 5–15)
BACTERIA UR QL AUTO: ABNORMAL /HPF
BASOPHILS # BLD AUTO: 0.03 10*3/MM3 (ref 0–0.2)
BASOPHILS NFR BLD AUTO: 0.4 % (ref 0–1.5)
BILIRUB UR QL STRIP: NEGATIVE
BUN BLD-MCNC: 13 MG/DL (ref 8–23)
BUN/CREAT SERPL: 16.9 (ref 7–25)
CALCIUM SPEC-SCNC: 9.8 MG/DL (ref 8.6–10.5)
CHLORIDE SERPL-SCNC: 98 MMOL/L (ref 98–107)
CLARITY UR: CLEAR
CO2 SERPL-SCNC: 25.5 MMOL/L (ref 22–29)
COLOR UR: YELLOW
CREAT BLD-MCNC: 0.77 MG/DL (ref 0.57–1)
DEPRECATED RDW RBC AUTO: 44.3 FL (ref 37–54)
EOSINOPHIL # BLD AUTO: 0.18 10*3/MM3 (ref 0–0.4)
EOSINOPHIL NFR BLD AUTO: 2.6 % (ref 0.3–6.2)
ERYTHROCYTE [DISTWIDTH] IN BLOOD BY AUTOMATED COUNT: 13.6 % (ref 12.3–15.4)
GFR SERPL CREATININE-BSD FRML MDRD: 75 ML/MIN/1.73
GLUCOSE BLD-MCNC: 105 MG/DL (ref 65–99)
GLUCOSE UR STRIP-MCNC: NEGATIVE MG/DL
HCT VFR BLD AUTO: 48.1 % (ref 34–46.6)
HGB BLD-MCNC: 15.5 G/DL (ref 12–15.9)
HGB UR QL STRIP.AUTO: NEGATIVE
HYALINE CASTS UR QL AUTO: ABNORMAL /LPF
IMM GRANULOCYTES # BLD AUTO: 0.02 10*3/MM3 (ref 0–0.05)
IMM GRANULOCYTES NFR BLD AUTO: 0.3 % (ref 0–0.5)
KETONES UR QL STRIP: NEGATIVE
LEUKOCYTE ESTERASE UR QL STRIP.AUTO: ABNORMAL
LYMPHOCYTES # BLD AUTO: 2.27 10*3/MM3 (ref 0.7–3.1)
LYMPHOCYTES NFR BLD AUTO: 33.3 % (ref 19.6–45.3)
MCH RBC QN AUTO: 28.6 PG (ref 26.6–33)
MCHC RBC AUTO-ENTMCNC: 32.2 G/DL (ref 31.5–35.7)
MCV RBC AUTO: 88.7 FL (ref 79–97)
MONOCYTES # BLD AUTO: 0.51 10*3/MM3 (ref 0.1–0.9)
MONOCYTES NFR BLD AUTO: 7.5 % (ref 5–12)
NEUTROPHILS # BLD AUTO: 3.81 10*3/MM3 (ref 1.7–7)
NEUTROPHILS NFR BLD AUTO: 55.9 % (ref 42.7–76)
NITRITE UR QL STRIP: NEGATIVE
NRBC BLD AUTO-RTO: 0 /100 WBC (ref 0–0.2)
PH UR STRIP.AUTO: 7 [PH] (ref 5–8)
PLATELET # BLD AUTO: 183 10*3/MM3 (ref 140–450)
PMV BLD AUTO: 10.5 FL (ref 6–12)
POTASSIUM BLD-SCNC: 4.2 MMOL/L (ref 3.5–5.2)
PROT UR QL STRIP: NEGATIVE
RBC # BLD AUTO: 5.42 10*6/MM3 (ref 3.77–5.28)
RBC # UR: ABNORMAL /HPF
REF LAB TEST METHOD: ABNORMAL
SODIUM BLD-SCNC: 137 MMOL/L (ref 136–145)
SP GR UR STRIP: 1.02 (ref 1–1.03)
SQUAMOUS #/AREA URNS HPF: ABNORMAL /HPF
UROBILINOGEN UR QL STRIP: ABNORMAL
WBC NRBC COR # BLD: 6.82 10*3/MM3 (ref 3.4–10.8)
WBC UR QL AUTO: ABNORMAL /HPF

## 2019-09-03 PROCEDURE — 81001 URINALYSIS AUTO W/SCOPE: CPT | Performed by: EMERGENCY MEDICINE

## 2019-09-03 PROCEDURE — 85025 COMPLETE CBC W/AUTO DIFF WBC: CPT | Performed by: EMERGENCY MEDICINE

## 2019-09-03 PROCEDURE — 99213 OFFICE O/P EST LOW 20 MIN: CPT | Performed by: COLON & RECTAL SURGERY

## 2019-09-03 PROCEDURE — 99283 EMERGENCY DEPT VISIT LOW MDM: CPT

## 2019-09-03 PROCEDURE — 80048 BASIC METABOLIC PNL TOTAL CA: CPT | Performed by: EMERGENCY MEDICINE

## 2019-09-03 RX ORDER — CELECOXIB 100 MG/1
200 CAPSULE ORAL ONCE
Status: CANCELLED | OUTPATIENT
Start: 2019-09-03

## 2019-09-03 RX ORDER — ACETAMINOPHEN 500 MG
1000 TABLET ORAL EVERY 6 HOURS
Status: CANCELLED | OUTPATIENT
Start: 2019-09-03

## 2019-09-03 RX ORDER — CLONIDINE HYDROCHLORIDE 0.1 MG/1
0.2 TABLET ORAL ONCE
Status: COMPLETED | OUTPATIENT
Start: 2019-09-03 | End: 2019-09-03

## 2019-09-03 RX ORDER — CLONIDINE HYDROCHLORIDE 0.1 MG/1
0.2 TABLET ORAL ONCE
Status: DISCONTINUED | OUTPATIENT
Start: 2019-09-03 | End: 2019-09-03

## 2019-09-03 RX ORDER — ALVIMOPAN 12 MG/1
12 CAPSULE ORAL ONCE
Status: CANCELLED | OUTPATIENT
Start: 2019-09-03 | End: 2019-09-10

## 2019-09-03 RX ORDER — CHLORHEXIDINE GLUCONATE 4 G/100ML
SOLUTION TOPICAL 2 TIMES DAILY
Qty: 236 ML | Refills: 0 | OUTPATIENT
Start: 2019-09-03 | End: 2020-02-21

## 2019-09-03 RX ADMIN — CLONIDINE HYDROCHLORIDE 0.2 MG: 0.1 TABLET ORAL at 11:18

## 2019-09-03 NOTE — ED NOTES
Pt arrives with c/o HTN, she was in the office and was told that her BP was too high.      Ina Hanna RN  09/03/19 2944

## 2019-09-03 NOTE — ED PROVIDER NOTES
EMERGENCY DEPARTMENT ENCOUNTER    CHIEF COMPLAINT  Chief Complaint: HTN  History given by: pt  History limited by: nothing  Room Number: 07/07  PMD: John Parker MD      HPI:  Pt is a 64 y.o. female who presents complaining of HTN. Pt states she was in Dr Vidal Krishnan's office today and was instructed to present in the ED for elevated BP (230/108). Pt states she 'feels fine.' She is uncertain of the last time her BP was checked or when this current episode of elevation originated. She denies headache, CP, SOA, fever, urinary problems, chills, numbness and all other complaints at this time. She has a hx of HTN. Pt admits she forgot to take her BP medication this morning.     Duration:  chronic  Onset: chronic  Timing: constant  Location: vasculature  Quality: elevated BP  Intensity/Severity: moderate  Progression: unchanged  Associated Symptoms: none  Aggravating Factors: none  Alleviating Factors: none  Previous Episodes: hx of HTN  Treatment before arrival: none    PAST MEDICAL HISTORY  Active Ambulatory Problems     Diagnosis Date Noted   • Anxiety and depression 08/29/2016   • Mixed hyperlipidemia 08/29/2016   • Essential hypertension 08/29/2016   • Adiposity 08/29/2016   • B12 deficiency 08/29/2016   • Avitaminosis D 08/29/2016   • Routine adult health maintenance 05/23/2018   • Arthritis 05/23/2018   • Uncontrolled hypertension 03/26/2019   • Medicare welcome exam 03/26/2019   • Screen for colon cancer 05/06/2019     Resolved Ambulatory Problems     Diagnosis Date Noted   • Deformity of foot 08/29/2016   • Myalgia 08/29/2016     Past Medical History:   Diagnosis Date   • Abdominal wall mass 03/2014   • Abnormal mammogram 09/04/2018   • Abnormal mammogram 06/2015   • Anxiety    • Arthritis    • Colon polyps    • Colon stricture (CMS/HCC) 07/17/2019   • Depression    • Foot deformity    • GERD (gastroesophageal reflux disease)    • Hematuria    • Hyperlipidemia    • Hypertension    • Left breast mass  2015   • Myalgia    • OA (osteoarthritis)    • Obesity    • Post-menopausal    • Vitamin B 12 deficiency    • Vitamin D insufficiency        PAST SURGICAL HISTORY  Past Surgical History:   Procedure Laterality Date   • BILATERAL BREAST REDUCTION Bilateral    • CARDIAC CATHETERIZATION Right 2014    MILD NONOBSTRUCTIVE CAD, NORMAL LEFT VENTRICULAR SYSTOLIC FUNCTION, LEFT VENTRICULAR HYPERTROPHY, DR. LESTER HECK AT [facility]   •  SECTION N/A 1976   •  SECTION N/A 1979   • CHOLECYSTECTOMY N/A 2014    LAPAROSCOPIC WITH CHOLANGIOGRAM, DR. TRAY NARVAEZ AT [facility]   • COLONOSCOPY N/A 2003    GOOD PREP, NONSPECIFIC ILEITIS, SIGMOID DIVERTICULOSIS, DR. AURY SOTELO AT [facility]   • COLONOSCOPY N/A 2019    SIGMOID STRICTURE, DIVERTICULOSIS, PELVIC ADHESIONS, DR. AURY SOTELO AT [facility]   • COLONOSCOPY W/ POLYPECTOMY N/A 2003    LARGE INTERNAL HEMORRHOIDS, 3 MM HYPERPLASTIC POLYP IN RECTUM, SCATTERED DIVERTICULOSIS, DR. ADOLFO ISLAS AT [facility]    • ERCP N/A 01/10/2014    CHOLEDOCHOLITHIASIS WAS FOUND, COMPLETE REMOVAL BY BILIARY SPHINCTEROTOMY AND BALLOON EXTRACTION, DR. MARGA MARAVILLA AT [facility]   • ERCP N/A 2014    PRIOR BILIARY SPHINCTEROTOMY APPEARED STENOSED, DR. MARGA MARAVILLA AT [facility]   • ERCP WITH FOREIGN BODY REMOVAL N/A 2014    PRIOR BILIARY SPHINCTEROTOMY APPEARD OPEN, 1 VISIBLY OCCLUDED STENT FROM THE BILIARY TREE WAS IN THE MAJOR PAPILLA, STENT REMOVED FROM BILIARY TREE, DR. MARGA MARAVILLA AT [facility]   • MOUTH SURGERY N/A 2018       FAMILY HISTORY  Family History   Problem Relation Age of Onset   • COPD Mother    • Emphysema Mother    • Heart disease Mother    • Heart disease Father    • Kidney disease Father    • Vision loss Father    • Cancer Father    • Diabetes Father    • Lymphoma Father    • Stroke Brother    • Hypertension Brother    • Coronary artery disease Brother         stent   • Peripheral vascular disease Brother         stent  "in the carotid   • Diabetes Brother        SOCIAL HISTORY  Social History     Socioeconomic History   • Marital status:      Spouse name: Venu\"Miguel\"   • Number of children: 2   • Years of education: College degree   • Highest education level: Not on file   Occupational History   • Occupation:    Tobacco Use   • Smoking status: Former Smoker     Years: 11.00     Types: Cigarettes     Last attempt to quit: 2004     Years since quitting: 15.6   • Smokeless tobacco: Never Used   Substance and Sexual Activity   • Alcohol use: Yes     Frequency: Monthly or less     Drinks per session: 1 or 2     Comment: 0-1 per month   • Drug use: No   • Sexual activity: Yes     Partners: Male     Birth control/protection: Post-menopausal       ALLERGIES  Elavil [amitriptyline hcl] and Norvasc [amlodipine besylate]    REVIEW OF SYSTEMS  Review of Systems   Constitutional: Negative for fever.   HENT: Negative for sore throat.    Eyes: Negative.    Respiratory: Negative for cough and shortness of breath.    Cardiovascular: Negative for chest pain.   Gastrointestinal: Negative for abdominal pain, diarrhea and vomiting.   Genitourinary: Negative for dysuria.   Musculoskeletal: Negative for neck pain.   Skin: Negative for rash.   Allergic/Immunologic: Negative.    Neurological: Negative for weakness, numbness and headaches.   Hematological: Negative.    Psychiatric/Behavioral: Negative.    All other systems reviewed and are negative.      PHYSICAL EXAM  ED Triage Vitals   Temp Heart Rate Resp BP SpO2   09/03/19 1020 09/03/19 1020 09/03/19 1020 09/03/19 1033 09/03/19 1020   97.8 °F (36.6 °C) 62 16 (!) 210/97 97 %     Physical Exam   Constitutional: She is oriented to person, place, and time. No distress.   HENT:   Head: Normocephalic and atraumatic.   Eyes: EOM are normal. Pupils are equal, round, and reactive to light.   Neck: Normal range of motion. Neck supple.   Cardiovascular: Normal rate, regular rhythm and normal " heart sounds.   HR is the 60s. RRR.   Pulmonary/Chest: Effort normal and breath sounds normal. No respiratory distress.   Lungs are CTAB   Abdominal: Soft. There is no tenderness. There is no rebound and no guarding.   Musculoskeletal: Normal range of motion. She exhibits no edema.   Neurological: She is alert and oriented to person, place, and time. She has normal sensation and normal strength.   Skin: Skin is warm and dry. No rash noted.   Psychiatric: Mood and affect normal.   Nursing note and vitals reviewed.      LAB RESULTS  Lab Results (last 24 hours)     Procedure Component Value Units Date/Time    Urinalysis With Microscopic If Indicated (No Culture) - Urine, Clean Catch [[id number]]  (Abnormal) Collected:  09/03/19 1114    Specimen:  Urine, Clean Catch Updated:  09/03/19 1136     Color, UA Yellow     Appearance, UA Clear     pH, UA 7.0     Specific Gravity, UA 1.022     Glucose, UA Negative     Ketones, UA Negative     Bilirubin, UA Negative     Blood, UA Negative     Protein, UA Negative     Leuk Esterase, UA Trace     Nitrite, UA Negative     Urobilinogen, UA 0.2 E.U./dL    Urinalysis, Microscopic Only - Urine, Clean Catch [[id number]]  (Abnormal) Collected:  09/03/19 1114    Specimen:  Urine, Clean Catch Updated:  09/03/19 1136     RBC, UA 0-2 /HPF      WBC, UA 6-12 /HPF      Bacteria, UA 4+ /HPF      Squamous Epithelial Cells, UA 0-2 /HPF      Hyaline Casts, UA 0-2 /LPF      Methodology Automated Microscopy    CBC & Differential [[id number]] Collected:  09/03/19 1115    Specimen:  Blood Updated:  09/03/19 1125    Narrative:       The following orders were created for panel order CBC & Differential.  Procedure                               Abnormality         Status                     ---------                               -----------         ------                     CBC Auto Differential[[id number]]        Abnormal            Final result                 Please view results for these tests on the  individual orders.    Basic Metabolic Panel [[id number]]  (Abnormal) Collected:  09/03/19 1115    Specimen:  Blood Updated:  09/03/19 1154     Glucose 105 mg/dL      BUN 13 mg/dL      Creatinine 0.77 mg/dL      Sodium 137 mmol/L      Potassium 4.2 mmol/L      Chloride 98 mmol/L      CO2 25.5 mmol/L      Calcium 9.8 mg/dL      eGFR Non African Amer 75 mL/min/1.73      BUN/Creatinine Ratio 16.9     Anion Gap 13.5 mmol/L     Narrative:       GFR Normal >60  Chronic Kidney Disease <60  Kidney Failure <15    CBC Auto Differential [[id number]]  (Abnormal) Collected:  09/03/19 1115    Specimen:  Blood Updated:  09/03/19 1125     WBC 6.82 10*3/mm3      RBC 5.42 10*6/mm3      Hemoglobin 15.5 g/dL      Hematocrit 48.1 %      MCV 88.7 fL      MCH 28.6 pg      MCHC 32.2 g/dL      RDW 13.6 %      RDW-SD 44.3 fl      MPV 10.5 fL      Platelets 183 10*3/mm3      Neutrophil % 55.9 %      Lymphocyte % 33.3 %      Monocyte % 7.5 %      Eosinophil % 2.6 %      Basophil % 0.4 %      Immature Grans % 0.3 %      Neutrophils, Absolute 3.81 10*3/mm3      Lymphocytes, Absolute 2.27 10*3/mm3      Monocytes, Absolute 0.51 10*3/mm3      Eosinophils, Absolute 0.18 10*3/mm3      Basophils, Absolute 0.03 10*3/mm3      Immature Grans, Absolute 0.02 10*3/mm3      nRBC 0.0 /100 WBC           I ordered the above labs and reviewed the results      PROGRESS AND CONSULTS  ED Course as of Sep 03 1401   Tue Sep 03, 2019   1219 Insert time now.  Patient has remained asymptomatic in the emergency department.  Repeat blood pressure is 183/78 after clonidine.The patient's blood pressure has been elevated in the emergency department. The patient has no signs or symptoms of end organ damage.The blood pressure doesn't need to be emergently lowered to normal levels. The patient needs to check blood pressure daily in order to get a trend of blood pressure readings and follow up with their doctor in the next 2-3 days for further management. I have talked at length  about elevated blood pressure with the patient. I informed them if the occurrence of certain signs and symptoms develop  They would need to return to the emergency department. I emphasized the importance of follow up. I answered all of their questions and they agree with the treatment plan.  All questions were answered and the patient agrees with this plan and will follow up with her primary care doctor.  Patient does not have any burning with urination, frequent urination and no signs of a UTI by symptoms  [MM]      ED Course User Index  [MM] Rah Hung MD         Final Diagnosis: as of Sep 03 1401   Benign essential hypertension     1117. Ordered Clonidine for elevated BP.    1206. Recheck. Systolic pressure in the 180s. Pt is in NAD and is resting comfortably. Informed pt of plan to discharge given non-emergent workup performed here in the ED. Pt instructed to follow-up with PMD for HTN control. Pt understands and agrees with the plan, all questions answered.      MEDICAL DECISION MAKING  Results were reviewed/discussed with the patient and they were also made aware of online access. Pt also made aware that some labs, such as cultures, will not be resulted during ER visit and follow up with PMD is necessary.     MDM  Number of Diagnoses or Management Options  Benign essential hypertension:      Amount and/or Complexity of Data Reviewed  Clinical lab tests: ordered and reviewed (Unremarkable labwork)  Decide to obtain previous medical records or to obtain history from someone other than the patient: yes (Epic)  Review and summarize past medical records: yes (/108 in Dr Vidal Krishnan's office. /94 on 8/16/2019. Creatinine was normal on 8/20/2019.)    Patient Progress  Patient progress: stable         DIAGNOSIS  Final diagnoses:   Benign essential hypertension       DISPOSITION  DISCHARGE    Patient discharged in stable condition.    Reviewed implications of results, diagnosis, meds, responsibility  to follow up, warning signs and symptoms of possible worsening, potential complications and reasons to return to ER.    Patient/Family voiced understanding of above instructions.    Discussed plan for discharge, as there is no emergent indication for admission. Patient referred to primary care provider for BP management due to today's BP. Pt/family is agreeable and understands need for follow up and repeat testing.  Pt is aware that discharge does not mean that nothing is wrong but it indicates no emergency is present that requires admission and they must continue care with follow-up as given below or physician of their choice.     FOLLOW-UP  John Parker MD  [address] [facility] [address] [phone]    Call in 3 days  to schedule an appointment.  Return if any chest pain, headache, weakness in extremities, shortness of breath, fever, any concerns         Medication List      No changes were made to your prescriptions during this visit.       Latest Documented Vital Signs:  As of 2:01 PM  BP- (!) 183/78 HR- 53 Temp- 97.8 °F (36.6 °C) (Tympanic) O2 sat- 98%    --  Documentation assistance provided by lillie Vasquez for Dr Anabell MD.  Information recorded by the scribe was done at my direction and has been verified and validated by me.       Tatum Vasquez  09/03/19 1220       Rah Hung MD  09/03/19 1407

## 2019-09-03 NOTE — DISCHARGE INSTRUCTIONS
Take your missed blood pressure that she did not take this morning when you get home.  Check blood pressure daily.  Follow-up with your doctor in 3 to 4 days and give him the recordings of your blood pressure.

## 2019-09-03 NOTE — PROGRESS NOTES
"Nery Jarrell is a 64 y.o. female in for follow up of colon stricture    She had CT a/p 8/20/2019: showed thickened proximal to mid-sigmoid colon mild dilation proximal    Her stools are mushy  She c/o fecal urgency.    No headache, no vision changes    BP (!) 230/108   Pulse 78   Temp 98.1 °F (36.7 °C)   Ht 154.9 cm (61\")   Wt 81.3 kg (179 lb 4.8 oz)   SpO2 98%   BMI 33.88 kg/m²   Body mass index is 33.88 kg/m².    BP re-take: 234/162    PE:  Physical Exam   Constitutional: She appears well-developed. No distress.   HENT:   Head: Normocephalic and atraumatic.   Abdominal: Soft. She exhibits no distension.   Musculoskeletal: Normal range of motion.   Neurological: She is alert.   Psychiatric: Thought content normal.         Assessment:   1. Colon stricture (CMS/HCC)    2. Hypertensive urgency         Plan:    Reviewed results of CT a/p with patient and her family: colonic thickening in sigmoid; mild dilation proximal.  I recommended to patient a laparoscopic sigmoid colon resection and possible diverting loop ileostomy.  I discussed with them typical surgical time, hospital recovery, and home recovery.   I described to the patient risks, benefits, and alternatives. I discussed risks of surgery being heart attack, stroke, exacerbation of chronic medical illness, pneumonia, DVT, PTE, infection, bleeding, and injury to other organs during surgery. Patient expresses understanding and will consider.      She states financially and with her work, she prefers to wait until next year.  Discussed risks of not proceeding with surgery in a timely fashion, including but not limited to emergent surgery necessitating colostomy.  Discussed return precautions.    Discussed that surgery will not necessarily fix her fecal urgency.      For severe HTN, recommended she proceed directly to ED for further evaluation and treatment. Call ahead to ED and spoke to Radhika, triage nurse.    Discussed will need tighter control of HTN prior to " surgery.       Scribed for Vidal Krishnan MD by oJrdana Hay PA-C  9/3/2019   This patient was evaluated by me, recommendations made, documentation reviewed, edited, and revised by me, Vidal Krishnan MD

## 2019-09-04 ENCOUNTER — TELEPHONE (OUTPATIENT)
Dept: SURGERY | Facility: CLINIC | Age: 64
End: 2019-09-04

## 2019-09-04 RX ORDER — HYDROCORTISONE ACETATE 25 MG/1
25 SUPPOSITORY RECTAL EVERY 12 HOURS
Qty: 14 SUPPOSITORY | Refills: 1 | Status: SHIPPED | OUTPATIENT
Start: 2019-09-04 | End: 2019-09-11

## 2019-09-04 NOTE — TELEPHONE ENCOUNTER
Pt says at appt yesterday she thought she was supposed to get HC supp Rx but when she went to pharmacy it was HC cream, also got an RX for Hibiclense and not sure why?

## 2019-10-25 DIAGNOSIS — E78.2 MIXED HYPERLIPIDEMIA: ICD-10-CM

## 2019-10-25 DIAGNOSIS — I10 ESSENTIAL HYPERTENSION, BENIGN: Primary | ICD-10-CM

## 2019-10-29 LAB
ALT SERPL-CCNC: 24 IU/L (ref 0–32)
APPEARANCE UR: ABNORMAL
BACTERIA #/AREA URNS HPF: ABNORMAL /[HPF]
BILIRUB UR QL STRIP: NEGATIVE
BUN SERPL-MCNC: 14 MG/DL (ref 8–27)
BUN/CREAT SERPL: 17 (ref 12–28)
CALCIUM SERPL-MCNC: 10 MG/DL (ref 8.7–10.3)
CHLORIDE SERPL-SCNC: 102 MMOL/L (ref 96–106)
CHOLEST SERPL-MCNC: 206 MG/DL (ref 100–199)
CO2 SERPL-SCNC: 25 MMOL/L (ref 20–29)
COLOR UR: YELLOW
CREAT SERPL-MCNC: 0.81 MG/DL (ref 0.57–1)
EPI CELLS #/AREA URNS HPF: >10 /HPF
GLUCOSE SERPL-MCNC: 103 MG/DL (ref 65–99)
GLUCOSE UR QL: NEGATIVE
HDLC SERPL-MCNC: 62 MG/DL
HGB UR QL STRIP: NEGATIVE
KETONES UR QL STRIP: NEGATIVE
LDLC SERPL CALC-MCNC: 121 MG/DL (ref 0–99)
LEUKOCYTE ESTERASE UR QL STRIP: ABNORMAL
MICRO URNS: ABNORMAL
MUCOUS THREADS URNS QL MICRO: PRESENT
NITRITE UR QL STRIP: NEGATIVE
PH UR STRIP: 6.5 [PH] (ref 5–7.5)
POTASSIUM SERPL-SCNC: 4.8 MMOL/L (ref 3.5–5.2)
PROT UR QL STRIP: ABNORMAL
RBC #/AREA URNS HPF: ABNORMAL /HPF
SODIUM SERPL-SCNC: 144 MMOL/L (ref 134–144)
SP GR UR: 1.02 (ref 1–1.03)
TRIGL SERPL-MCNC: 115 MG/DL (ref 0–149)
UROBILINOGEN UR STRIP-MCNC: 1 MG/DL (ref 0.2–1)
VLDLC SERPL CALC-MCNC: 23 MG/DL (ref 5–40)
WBC #/AREA URNS HPF: ABNORMAL /HPF

## 2019-11-04 ENCOUNTER — OFFICE VISIT (OUTPATIENT)
Dept: FAMILY MEDICINE CLINIC | Facility: CLINIC | Age: 64
End: 2019-11-04

## 2019-11-04 VITALS
SYSTOLIC BLOOD PRESSURE: 150 MMHG | BODY MASS INDEX: 34.74 KG/M2 | DIASTOLIC BLOOD PRESSURE: 100 MMHG | HEIGHT: 61 IN | OXYGEN SATURATION: 96 % | HEART RATE: 82 BPM | WEIGHT: 184 LBS

## 2019-11-04 DIAGNOSIS — M19.90 ARTHRITIS: ICD-10-CM

## 2019-11-04 DIAGNOSIS — I10 ESSENTIAL HYPERTENSION: ICD-10-CM

## 2019-11-04 PROCEDURE — 99213 OFFICE O/P EST LOW 20 MIN: CPT | Performed by: FAMILY MEDICINE

## 2019-11-04 RX ORDER — IBUPROFEN 800 MG/1
800 TABLET ORAL 3 TIMES DAILY PRN
Qty: 90 TABLET | Refills: 1 | Status: SHIPPED | OUTPATIENT
Start: 2019-11-04 | End: 2020-01-06

## 2019-11-04 RX ORDER — HYDROCHLOROTHIAZIDE 25 MG/1
25 TABLET ORAL DAILY
Qty: 90 TABLET | Refills: 1 | Status: SHIPPED | OUTPATIENT
Start: 2019-11-04 | End: 2020-05-18

## 2019-11-04 NOTE — PROGRESS NOTES
Chief Complaint   Patient presents with   • Hypertension       Subjective     Patient here for follow-up of elevated blood pressure.    She is not exercising and is not adherent to a low-salt diet.    Blood pressure is not well controlled at home.   Cardiac symptoms: none.   Patient denies: chest pressure/discomfort, claudication, cough, dyspnea, exertional chest pressure/discomfort, fatigue, irregular heart beat, lower extremity edema, near-syncope, orthopnea, palpitations, paroxysmal nocturnal dyspnea, syncope and tachypnea.   Cardiovascular risk factors: advanced age (older than 55 for men, 65 for women), dyslipidemia, family history of premature cardiovascular disease, hypertension, obesity (BMI >= 30 kg/m2) and sedentary lifestyle.   Use of agents associated with hypertension: NSAIDS.   History of target organ damage: none.  Patient is taking prescribed hypertension medications as prescribed without side effects.    The following portions of the patient's history were reviewed and updated as appropriate: allergies, current medications, past family history, past medical history, past social history, past surgical history and problem list.    Review of Systems  Pertinent items are noted in HPI.    Results for orders placed or performed in visit on 10/25/19   Lipid Panel   Result Value Ref Range    Total Cholesterol 206 (H) 100 - 199 mg/dL    Triglycerides 115 0 - 149 mg/dL    HDL Cholesterol 62 >39 mg/dL    VLDL Cholesterol 23 5 - 40 mg/dL    LDL Cholesterol  121 (H) 0 - 99 mg/dL   Basic Metabolic Panel   Result Value Ref Range    Glucose 103 (H) 65 - 99 mg/dL    BUN 14 8 - 27 mg/dL    Creatinine 0.81 0.57 - 1.00 mg/dL    eGFR Non African Am 76 >59 mL/min/1.73    eGFR African Am 88 >59 mL/min/1.73    BUN/Creatinine Ratio 17 12 - 28    Sodium 144 134 - 144 mmol/L    Potassium 4.8 3.5 - 5.2 mmol/L    Chloride 102 96 - 106 mmol/L    Total CO2 25 20 - 29 mmol/L    Calcium 10.0 8.7 - 10.3 mg/dL   ALT   Result Value  "Ref Range    ALT (SGPT) 24 0 - 32 IU/L   Urinalysis With Microscopic If Indicated (No Culture) - Urine, Clean Catch   Result Value Ref Range    Specific Gravity, UA 1.021 1.005 - 1.030    pH, UA 6.5 5.0 - 7.5    Color, UA Yellow Yellow    Appearance, UA Cloudy (A) Clear    Leukocytes, UA Trace (A) Negative    Protein 1+ (A) Negative/Trace    Glucose, UA Negative Negative    Ketones Negative Negative    Blood, UA Negative Negative    Bilirubin, UA Negative Negative    Urobilinogen, UA 1.0 0.2 - 1.0 mg/dL    Nitrite, UA Negative Negative    Microscopic Examination See below:    Microscopic Examination -   Result Value Ref Range    WBC, UA 11-30 (A) 0 - 5 /hpf    RBC, UA 0-2 0 - 2 /hpf    Epithelial Cells (non renal) >10 (A) 0 - 10 /hpf    Mucus, UA Present Not Estab.    Bacteria, UA Moderate (A) None seen/Few        Vitals:    11/04/19 1327 11/04/19 1343   BP: 136/82 150/100   BP Location: Left arm Right arm   Patient Position: Sitting    Cuff Size: Adult    Pulse: 82    SpO2: 96%    Weight: 83.5 kg (184 lb)    Height: 154.9 cm (61\")      BP Readings from Last 3 Encounters:   11/04/19 150/100   09/03/19 (!) 183/78   09/03/19 (!) 230/108     Objective      Gen: alert, pleasant.  HEENT: PERRL, EOMI intact, lids ok, ear canals clear, TMs normal, throat clear, nostrils normal  Neck: no bruit, no enlarged thyroid  Lungs: CTA  Heart: RR no murmur  ABD: soft , + BS  Pulses: intact  Mood: stable     Assessment/Plan   Hypertension, stage 1 Evidence of target organ damage: none.    Nery was seen today for hypertension.    Diagnoses and all orders for this visit:    Arthritis  -     ibuprofen (ADVIL,MOTRIN) 800 MG tablet; Take 1 tablet by mouth 3 (Three) Times a Day As Needed for Mild Pain .    Essential hypertension  -     hydroCHLOROthiazide (HYDRODIURIL) 25 MG tablet; Take 1 tablet by mouth Daily.    bp is still up  Lower when she checks it at home  Bp was very high when discussing surgery with dr larose and had to go to er " for an eval.    Medication: no change.  Follow up: 5 months and as needed.    There are no Patient Instructions on file for this visit.  Medications Discontinued During This Encounter   Medication Reason   • hydrochlorothiazide (HYDRODIURIL) 25 MG tablet Reorder        Return in about 4 months (around 3/4/2020) for Medicare Wellness visit.    Limit salt  Limit alcoholic drinks to 1 a day  Limit caffeine to 1-2 servings a day    Dr. John Parker MD  [place], [place]  [facility].

## 2019-12-09 ENCOUNTER — TELEPHONE (OUTPATIENT)
Dept: FAMILY MEDICINE CLINIC | Facility: CLINIC | Age: 64
End: 2019-12-09

## 2019-12-09 RX ORDER — VALSARTAN 320 MG/1
320 TABLET ORAL DAILY
Qty: 90 TABLET | Refills: 1 | Status: SHIPPED | OUTPATIENT
Start: 2019-12-09 | End: 2020-06-05

## 2019-12-09 NOTE — TELEPHONE ENCOUNTER
Irbesartan is on backorder needing alternative for patient, please advise.     Okay , generic diovan sent in to replace

## 2020-01-05 DIAGNOSIS — M19.90 ARTHRITIS: ICD-10-CM

## 2020-01-06 RX ORDER — IBUPROFEN 800 MG/1
800 TABLET ORAL 3 TIMES DAILY PRN
Qty: 90 TABLET | Refills: 1 | OUTPATIENT
Start: 2020-01-06 | End: 2020-02-21

## 2020-02-22 ENCOUNTER — APPOINTMENT (OUTPATIENT)
Dept: GENERAL RADIOLOGY | Facility: HOSPITAL | Age: 65
End: 2020-02-22

## 2020-02-22 ENCOUNTER — HOSPITAL ENCOUNTER (EMERGENCY)
Facility: HOSPITAL | Age: 65
Discharge: HOME | End: 2020-02-22
Attending: EMERGENCY MEDICINE | Admitting: EMERGENCY MEDICINE

## 2020-02-22 VITALS
BODY MASS INDEX: 32.09 KG/M2 | WEIGHT: 174.4 LBS | HEART RATE: 73 BPM | RESPIRATION RATE: 16 BRPM | DIASTOLIC BLOOD PRESSURE: 79 MMHG | OXYGEN SATURATION: 94 % | SYSTOLIC BLOOD PRESSURE: 129 MMHG | HEIGHT: 62 IN | TEMPERATURE: 98.2 F

## 2020-02-22 DIAGNOSIS — E87.6 HYPOKALEMIA: ICD-10-CM

## 2020-02-22 DIAGNOSIS — R53.81 MALAISE: Primary | ICD-10-CM

## 2020-02-22 DIAGNOSIS — B34.9 VIRAL SYNDROME: ICD-10-CM

## 2020-02-22 LAB
ALBUMIN SERPL-MCNC: 4.1 G/DL (ref 3.5–5.2)
ALBUMIN/GLOB SERPL: 1 G/DL
ALP SERPL-CCNC: 117 U/L (ref 39–117)
ALT SERPL W P-5'-P-CCNC: 25 U/L (ref 1–33)
ANION GAP SERPL CALCULATED.3IONS-SCNC: 16 MMOL/L (ref 5–15)
AST SERPL-CCNC: 24 U/L (ref 1–32)
BACTERIA UR QL AUTO: ABNORMAL /HPF
BASOPHILS # BLD AUTO: 0.02 10*3/MM3 (ref 0–0.2)
BASOPHILS NFR BLD AUTO: 0.2 % (ref 0–1.5)
BILIRUB SERPL-MCNC: 0.6 MG/DL (ref 0.2–1.2)
BILIRUB UR QL STRIP: NEGATIVE
BUN BLD-MCNC: 16 MG/DL (ref 8–23)
BUN/CREAT SERPL: 18.2 (ref 7–25)
CALCIUM SPEC-SCNC: 9.8 MG/DL (ref 8.6–10.5)
CHLORIDE SERPL-SCNC: 94 MMOL/L (ref 98–107)
CLARITY UR: CLEAR
CO2 SERPL-SCNC: 26 MMOL/L (ref 22–29)
COLOR UR: YELLOW
CREAT BLD-MCNC: 0.88 MG/DL (ref 0.57–1)
DEPRECATED RDW RBC AUTO: 40.5 FL (ref 37–54)
EOSINOPHIL # BLD AUTO: 0.01 10*3/MM3 (ref 0–0.4)
EOSINOPHIL NFR BLD AUTO: 0.1 % (ref 0.3–6.2)
ERYTHROCYTE [DISTWIDTH] IN BLOOD BY AUTOMATED COUNT: 13 % (ref 12.3–15.4)
GFR SERPL CREATININE-BSD FRML MDRD: 64 ML/MIN/1.73
GLOBULIN UR ELPH-MCNC: 4.2 GM/DL
GLUCOSE BLD-MCNC: 147 MG/DL (ref 65–99)
GLUCOSE UR STRIP-MCNC: NEGATIVE MG/DL
HCT VFR BLD AUTO: 42.8 % (ref 34–46.6)
HGB BLD-MCNC: 14.8 G/DL (ref 12–15.9)
HGB UR QL STRIP.AUTO: ABNORMAL
HYALINE CASTS UR QL AUTO: ABNORMAL /LPF
IMM GRANULOCYTES # BLD AUTO: 0.05 10*3/MM3 (ref 0–0.05)
IMM GRANULOCYTES NFR BLD AUTO: 0.5 % (ref 0–0.5)
KETONES UR QL STRIP: NEGATIVE
LEUKOCYTE ESTERASE UR QL STRIP.AUTO: NEGATIVE
LYMPHOCYTES # BLD AUTO: 1.04 10*3/MM3 (ref 0.7–3.1)
LYMPHOCYTES NFR BLD AUTO: 9.5 % (ref 19.6–45.3)
MCH RBC QN AUTO: 29.2 PG (ref 26.6–33)
MCHC RBC AUTO-ENTMCNC: 34.6 G/DL (ref 31.5–35.7)
MCV RBC AUTO: 84.6 FL (ref 79–97)
MONOCYTES # BLD AUTO: 1.21 10*3/MM3 (ref 0.1–0.9)
MONOCYTES NFR BLD AUTO: 11 % (ref 5–12)
MUCOUS THREADS URNS QL MICRO: ABNORMAL /HPF
NEUTROPHILS # BLD AUTO: 8.64 10*3/MM3 (ref 1.7–7)
NEUTROPHILS NFR BLD AUTO: 78.7 % (ref 42.7–76)
NITRITE UR QL STRIP: NEGATIVE
NRBC BLD AUTO-RTO: 0 /100 WBC (ref 0–0.2)
PH UR STRIP.AUTO: 6 [PH] (ref 4.5–8)
PLATELET # BLD AUTO: 178 10*3/MM3 (ref 140–450)
PMV BLD AUTO: 10.4 FL (ref 6–12)
POTASSIUM BLD-SCNC: 2.8 MMOL/L (ref 3.5–5.2)
PROT SERPL-MCNC: 8.3 G/DL (ref 6–8.5)
PROT UR QL STRIP: ABNORMAL
RBC # BLD AUTO: 5.06 10*6/MM3 (ref 3.77–5.28)
RBC # UR: ABNORMAL /HPF
REF LAB TEST METHOD: ABNORMAL
SODIUM BLD-SCNC: 136 MMOL/L (ref 136–145)
SP GR UR STRIP: 1.01 (ref 1–1.03)
SQUAMOUS #/AREA URNS HPF: ABNORMAL /HPF
UROBILINOGEN UR QL STRIP: ABNORMAL
WBC NRBC COR # BLD: 10.97 10*3/MM3 (ref 3.4–10.8)
WBC UR QL AUTO: ABNORMAL /HPF

## 2020-02-22 PROCEDURE — 80053 COMPREHEN METABOLIC PANEL: CPT | Performed by: EMERGENCY MEDICINE

## 2020-02-22 PROCEDURE — 96365 THER/PROPH/DIAG IV INF INIT: CPT

## 2020-02-22 PROCEDURE — 96375 TX/PRO/DX INJ NEW DRUG ADDON: CPT

## 2020-02-22 PROCEDURE — 81001 URINALYSIS AUTO W/SCOPE: CPT | Performed by: EMERGENCY MEDICINE

## 2020-02-22 PROCEDURE — 25010000002 ONDANSETRON PER 1 MG: Performed by: EMERGENCY MEDICINE

## 2020-02-22 PROCEDURE — 99284 EMERGENCY DEPT VISIT MOD MDM: CPT

## 2020-02-22 PROCEDURE — 71045 X-RAY EXAM CHEST 1 VIEW: CPT

## 2020-02-22 PROCEDURE — 25010000003 POTASSIUM CHLORIDE 10 MEQ/100ML SOLUTION: Performed by: EMERGENCY MEDICINE

## 2020-02-22 PROCEDURE — 99284 EMERGENCY DEPT VISIT MOD MDM: CPT | Performed by: EMERGENCY MEDICINE

## 2020-02-22 PROCEDURE — 85025 COMPLETE CBC W/AUTO DIFF WBC: CPT | Performed by: EMERGENCY MEDICINE

## 2020-02-22 RX ORDER — ONDANSETRON 2 MG/ML
4 INJECTION INTRAMUSCULAR; INTRAVENOUS ONCE
Status: COMPLETED | OUTPATIENT
Start: 2020-02-22 | End: 2020-02-22

## 2020-02-22 RX ORDER — ONDANSETRON 8 MG/1
8 TABLET, ORALLY DISINTEGRATING ORAL EVERY 8 HOURS PRN
Qty: 10 TABLET | Refills: 0 | Status: SHIPPED | OUTPATIENT
Start: 2020-02-22 | End: 2020-09-16

## 2020-02-22 RX ORDER — POTASSIUM CHLORIDE 20 MEQ/1
40 TABLET, EXTENDED RELEASE ORAL ONCE
Status: COMPLETED | OUTPATIENT
Start: 2020-02-22 | End: 2020-02-22

## 2020-02-22 RX ORDER — ACETAMINOPHEN 500 MG
1000 TABLET ORAL ONCE
Status: COMPLETED | OUTPATIENT
Start: 2020-02-22 | End: 2020-02-22

## 2020-02-22 RX ORDER — POTASSIUM CHLORIDE 7.45 MG/ML
10 INJECTION INTRAVENOUS ONCE
Status: COMPLETED | OUTPATIENT
Start: 2020-02-22 | End: 2020-02-22

## 2020-02-22 RX ORDER — POTASSIUM CHLORIDE 20 MEQ/1
20 TABLET, EXTENDED RELEASE ORAL DAILY
Qty: 5 TABLET | Refills: 0 | Status: SHIPPED | OUTPATIENT
Start: 2020-02-22 | End: 2023-02-20

## 2020-02-22 RX ADMIN — SODIUM CHLORIDE 1000 ML: 9 INJECTION, SOLUTION INTRAVENOUS at 06:59

## 2020-02-22 RX ADMIN — POTASSIUM CHLORIDE 40 MEQ: 1500 TABLET, EXTENDED RELEASE ORAL at 07:48

## 2020-02-22 RX ADMIN — ACETAMINOPHEN 1000 MG: 500 TABLET, FILM COATED ORAL at 06:58

## 2020-02-22 RX ADMIN — POTASSIUM CHLORIDE 10 MEQ: 7.46 INJECTION, SOLUTION INTRAVENOUS at 07:48

## 2020-02-22 RX ADMIN — ONDANSETRON 4 MG: 2 INJECTION, SOLUTION INTRAMUSCULAR; INTRAVENOUS at 06:58

## 2020-03-16 DIAGNOSIS — M19.90 ARTHRITIS: ICD-10-CM

## 2020-03-16 RX ORDER — IBUPROFEN 800 MG/1
800 TABLET ORAL 3 TIMES DAILY PRN
Qty: 90 TABLET | Refills: 1 | OUTPATIENT
Start: 2020-03-16

## 2020-03-19 DIAGNOSIS — Z51.81 MEDICATION MONITORING ENCOUNTER: ICD-10-CM

## 2020-03-19 DIAGNOSIS — R53.83 FATIGUE, UNSPECIFIED TYPE: ICD-10-CM

## 2020-03-19 DIAGNOSIS — E78.2 MIXED HYPERLIPIDEMIA: Primary | ICD-10-CM

## 2020-03-24 DIAGNOSIS — E78.2 MIXED HYPERLIPIDEMIA: ICD-10-CM

## 2020-03-24 RX ORDER — ROSUVASTATIN CALCIUM 5 MG/1
TABLET, COATED ORAL
Qty: 90 TABLET | Refills: 0 | Status: SHIPPED | OUTPATIENT
Start: 2020-03-24 | End: 2020-06-18

## 2020-03-26 DIAGNOSIS — E78.2 MIXED HYPERLIPIDEMIA: ICD-10-CM

## 2020-03-26 RX ORDER — EZETIMIBE 10 MG/1
TABLET ORAL
Qty: 90 TABLET | Refills: 3 | OUTPATIENT
Start: 2020-03-26

## 2020-04-21 DIAGNOSIS — E78.2 MIXED HYPERLIPIDEMIA: ICD-10-CM

## 2020-04-21 RX ORDER — EZETIMIBE 10 MG/1
TABLET ORAL
Qty: 90 TABLET | Refills: 1 | Status: SHIPPED | OUTPATIENT
Start: 2020-04-21 | End: 2020-09-16 | Stop reason: SDUPTHER

## 2020-05-17 DIAGNOSIS — I10 ESSENTIAL HYPERTENSION: ICD-10-CM

## 2020-05-18 RX ORDER — HYDROCHLOROTHIAZIDE 25 MG/1
TABLET ORAL
Qty: 90 TABLET | Refills: 1 | Status: SHIPPED | OUTPATIENT
Start: 2020-05-18 | End: 2020-11-17

## 2020-06-05 RX ORDER — VALSARTAN 320 MG/1
320 TABLET ORAL DAILY
Qty: 30 TABLET | Refills: 0 | Status: SHIPPED | OUTPATIENT
Start: 2020-06-05 | End: 2020-07-07

## 2020-06-18 DIAGNOSIS — E78.2 MIXED HYPERLIPIDEMIA: ICD-10-CM

## 2020-06-18 RX ORDER — ROSUVASTATIN CALCIUM 5 MG/1
TABLET, COATED ORAL
Qty: 30 TABLET | Refills: 0 | Status: SHIPPED | OUTPATIENT
Start: 2020-06-18 | End: 2020-07-20

## 2020-07-07 RX ORDER — VALSARTAN 320 MG/1
320 TABLET ORAL DAILY
Qty: 30 TABLET | Refills: 0 | Status: SHIPPED | OUTPATIENT
Start: 2020-07-07 | End: 2020-07-30

## 2020-07-20 DIAGNOSIS — E78.2 MIXED HYPERLIPIDEMIA: ICD-10-CM

## 2020-07-20 RX ORDER — ROSUVASTATIN CALCIUM 5 MG/1
TABLET, COATED ORAL
Qty: 30 TABLET | Refills: 0 | Status: SHIPPED | OUTPATIENT
Start: 2020-07-20 | End: 2020-08-17

## 2020-07-30 RX ORDER — VALSARTAN 320 MG/1
320 TABLET ORAL DAILY
Qty: 30 TABLET | Refills: 0 | Status: SHIPPED | OUTPATIENT
Start: 2020-07-30 | End: 2020-08-26

## 2020-08-16 DIAGNOSIS — E78.2 MIXED HYPERLIPIDEMIA: ICD-10-CM

## 2020-08-17 RX ORDER — ROSUVASTATIN CALCIUM 5 MG/1
TABLET, COATED ORAL
Qty: 30 TABLET | Refills: 0 | Status: SHIPPED | OUTPATIENT
Start: 2020-08-17 | End: 2020-09-16 | Stop reason: SDUPTHER

## 2020-08-26 RX ORDER — VALSARTAN 320 MG/1
320 TABLET ORAL DAILY
Qty: 30 TABLET | Refills: 0 | Status: SHIPPED | OUTPATIENT
Start: 2020-08-26 | End: 2020-09-16 | Stop reason: SDUPTHER

## 2020-09-10 LAB
ALT SERPL-CCNC: 24 U/L (ref 1–33)
BUN SERPL-MCNC: 19 MG/DL (ref 8–23)
BUN/CREAT SERPL: 20.2 (ref 7–25)
CALCIUM SERPL-MCNC: 9.6 MG/DL (ref 8.6–10.5)
CHLORIDE SERPL-SCNC: 102 MMOL/L (ref 98–107)
CHOLEST SERPL-MCNC: 210 MG/DL (ref 0–200)
CO2 SERPL-SCNC: 28.4 MMOL/L (ref 22–29)
CREAT SERPL-MCNC: 0.94 MG/DL (ref 0.57–1)
ERYTHROCYTE [DISTWIDTH] IN BLOOD BY AUTOMATED COUNT: 12.9 % (ref 12.3–15.4)
GLUCOSE SERPL-MCNC: 126 MG/DL (ref 65–99)
HCT VFR BLD AUTO: 43.3 % (ref 34–46.6)
HDLC SERPL-MCNC: 61 MG/DL (ref 40–60)
HGB BLD-MCNC: 14.3 G/DL (ref 12–15.9)
LDLC SERPL CALC-MCNC: 127 MG/DL (ref 0–100)
MCH RBC QN AUTO: 29.2 PG (ref 26.6–33)
MCHC RBC AUTO-ENTMCNC: 33 G/DL (ref 31.5–35.7)
MCV RBC AUTO: 88.4 FL (ref 79–97)
PLATELET # BLD AUTO: 203 10*3/MM3 (ref 140–450)
POTASSIUM SERPL-SCNC: 4.5 MMOL/L (ref 3.5–5.2)
RBC # BLD AUTO: 4.9 10*6/MM3 (ref 3.77–5.28)
SODIUM SERPL-SCNC: 142 MMOL/L (ref 136–145)
TRIGL SERPL-MCNC: 111 MG/DL (ref 0–150)
TSH SERPL DL<=0.005 MIU/L-ACNC: 1.39 UIU/ML (ref 0.27–4.2)
VLDLC SERPL CALC-MCNC: 22.2 MG/DL
WBC # BLD AUTO: 6.8 10*3/MM3 (ref 3.4–10.8)

## 2020-09-14 DIAGNOSIS — E78.2 MIXED HYPERLIPIDEMIA: ICD-10-CM

## 2020-09-14 RX ORDER — ROSUVASTATIN CALCIUM 5 MG/1
TABLET, COATED ORAL
Qty: 30 TABLET | Refills: 0 | OUTPATIENT
Start: 2020-09-14

## 2020-09-16 ENCOUNTER — OFFICE VISIT (OUTPATIENT)
Dept: FAMILY MEDICINE CLINIC | Facility: CLINIC | Age: 65
End: 2020-09-16

## 2020-09-16 VITALS
HEIGHT: 62 IN | WEIGHT: 173 LBS | SYSTOLIC BLOOD PRESSURE: 120 MMHG | OXYGEN SATURATION: 97 % | DIASTOLIC BLOOD PRESSURE: 84 MMHG | HEART RATE: 75 BPM | BODY MASS INDEX: 31.83 KG/M2

## 2020-09-16 DIAGNOSIS — Z00.00 MEDICARE ANNUAL WELLNESS VISIT, SUBSEQUENT: Primary | ICD-10-CM

## 2020-09-16 DIAGNOSIS — I10 ESSENTIAL HYPERTENSION: ICD-10-CM

## 2020-09-16 DIAGNOSIS — E66.09 CLASS 1 OBESITY DUE TO EXCESS CALORIES WITH SERIOUS COMORBIDITY AND BODY MASS INDEX (BMI) OF 31.0 TO 31.9 IN ADULT: ICD-10-CM

## 2020-09-16 DIAGNOSIS — E78.2 MIXED HYPERLIPIDEMIA: ICD-10-CM

## 2020-09-16 DIAGNOSIS — Z20.822 EXPOSURE TO COVID-19 VIRUS: ICD-10-CM

## 2020-09-16 PROCEDURE — 99213 OFFICE O/P EST LOW 20 MIN: CPT | Performed by: FAMILY MEDICINE

## 2020-09-16 PROCEDURE — G0439 PPPS, SUBSEQ VISIT: HCPCS | Performed by: FAMILY MEDICINE

## 2020-09-16 RX ORDER — VALSARTAN 320 MG/1
320 TABLET ORAL DAILY
Qty: 90 TABLET | Refills: 0 | Status: SHIPPED | OUTPATIENT
Start: 2020-09-16 | End: 2020-12-17

## 2020-09-16 RX ORDER — EZETIMIBE 10 MG/1
10 TABLET ORAL DAILY
Qty: 90 TABLET | Refills: 0 | Status: SHIPPED | OUTPATIENT
Start: 2020-09-16 | End: 2020-12-23

## 2020-09-16 RX ORDER — ROSUVASTATIN CALCIUM 5 MG/1
5 TABLET, COATED ORAL DAILY
Qty: 90 TABLET | Refills: 0 | Status: SHIPPED | OUTPATIENT
Start: 2020-09-16 | End: 2020-12-17

## 2020-09-16 NOTE — PROGRESS NOTES
The ABCs of the Annual Wellness Visit  Subsequent Medicare Wellness Visit    Chief Complaint   Patient presents with   • Medicare Wellness-subsequent   • Labs Only     would like covid antibody    • Hyperlipidemia   • Hypertension       Subjective   History of Present Illness:  Nery Jarrell is a 65 y.o. female who presents for a Subsequent Medicare Wellness Visit.    HEALTH RISK ASSESSMENT    Recent Hospitalizations:  No hospitalization(s) within the last year.    Current Medical Providers:  Patient Care Team:  John Parker MD as PCP - General  John Parker MD as PCP - Claims Attributed  GravesLuz MD as Consulting Physician (Obstetrics and Gynecology)  KareemIggy castro MD as Consulting Physician (Gastroenterology)    Smoking Status:  Social History     Tobacco Use   Smoking Status Former Smoker   • Years: 11.00   • Types: Cigarettes   • Quit date:    • Years since quittin.7   Smokeless Tobacco Never Used       Alcohol Consumption:  Social History     Substance and Sexual Activity   Alcohol Use Yes   • Frequency: Monthly or less   • Drinks per session: 1 or 2    Comment: 0-1 per month       Depression Screen:   PHQ-2/PHQ-9 Depression Screening 2020   Little interest or pleasure in doing things 3   Feeling down, depressed, or hopeless 3   Trouble falling or staying asleep, or sleeping too much 3   Feeling tired or having little energy 3   Poor appetite or overeating 3   Feeling bad about yourself - or that you are a failure or have let yourself or your family down 0   Trouble concentrating on things, such as reading the newspaper or watching television 3   Moving or speaking so slowly that other people could have noticed. Or the opposite - being so fidgety or restless that you have been moving around a lot more than usual 0   Thoughts that you would be better off dead, or of hurting yourself in some way 0   Total Score 18       Fall Risk Screen:  STEADI Fall Risk  Assessment has not been completed.    Health Habits and Functional and Cognitive Screening:  Functional & Cognitive Status 9/16/2020   Do you have difficulty preparing food and eating? No   Do you have difficulty bathing yourself, getting dressed or grooming yourself? No   Do you have difficulty using the toilet? No   Do you have difficulty moving around from place to place? No   Do you have trouble with steps or getting out of a bed or a chair? No   Current Diet Well Balanced Diet   Dental Exam Up to date   Eye Exam Not up to date   Exercise (times per week) 0 times per week   Current Exercise Activities Include None   Do you need help using the phone?  No   Are you deaf or do you have serious difficulty hearing?  No   Do you need help with transportation? No   Do you need help shopping? No   Do you need help preparing meals?  No   Do you need help with housework?  No   Do you need help with laundry? No   Do you need help taking your medications? No   Do you need help managing money? No   Do you ever drive or ride in a car without wearing a seat belt? No   Have you felt unusual stress, anger or loneliness in the last month? Yes   Who do you live with? Spouse   If you need help, do you have trouble finding someone available to you? No   Have you been bothered in the last four weeks by sexual problems? No   Do you have difficulty concentrating, remembering or making decisions? No         Does the patient have evidence of cognitive impairment? No    Asprin use counseling:Does not need ASA (and currently is not on it)    Age-appropriate Screening Schedule:  Refer to the list below for future screening recommendations based on patient's age, sex and/or medical conditions. Orders for these recommended tests are listed in the plan section. The patient has been provided with a written plan.    Health Maintenance   Topic Date Due   • ZOSTER VACCINE (2 of 3) 05/21/2019   • INFLUENZA VACCINE  08/01/2020   • MAMMOGRAM   09/04/2020   • LIPID PANEL  09/09/2021   • TDAP/TD VACCINES (2 - Td) 07/15/2023   • COLONOSCOPY  07/17/2029          The following portions of the patient's history were reviewed and updated as appropriate: allergies, current medications, past family history, past medical history, past social history, past surgical history and problem list.    Outpatient Medications Prior to Visit   Medication Sig Dispense Refill   • hydroCHLOROthiazide (HYDRODIURIL) 25 MG tablet TAKE 1 TABLET BY MOUTH EVERY DAY 90 tablet 1   • magnesium gluconate (MAGONATE) 500 MG tablet Take 27 mg by mouth 2 (Two) Times a Day.     • potassium chloride (K-DUR,KLOR-CON) 20 MEQ CR tablet Take 1 tablet by mouth Daily. 5 tablet 0   • Vitamin D, Cholecalciferol, 1000 units capsule Take 5,000 Units by mouth.     • ezetimibe (ZETIA) 10 MG tablet TAKE 1 TABLET BY MOUTH EVERY DAY 90 tablet 1   • rosuvastatin (CRESTOR) 5 MG tablet TAKE 1 TABLET BY MOUTH EVERY DAY 30 tablet 0   • valsartan (DIOVAN) 320 MG tablet TAKE 1 TABLET BY MOUTH DAILY. REPLACES AVAPRO ON BACK ORDER 30 tablet 0   • doxycycline (MONODOX) 100 MG capsule Take 1 capsule by mouth 2 (Two) Times a Day. 20 capsule 0   • guaiFENesin (MUCINEX) 600 MG 12 hr tablet Take 2 tablets by mouth 2 (Two) Times a Day. 30 tablet 0   • ondansetron ODT (ZOFRAN ODT) 8 MG disintegrating tablet Take 1 tablet by mouth Every 8 (Eight) Hours As Needed for Nausea. 10 tablet 0     No facility-administered medications prior to visit.        Patient Active Problem List   Diagnosis   • Anxiety and depression   • Mixed hyperlipidemia   • Essential hypertension   • Class 1 obesity due to excess calories with serious comorbidity and body mass index (BMI) of 31.0 to 31.9 in adult   • B12 deficiency   • Avitaminosis D   • Routine adult health maintenance   • Arthritis   • Medicare annual wellness visit, subsequent   • Screen for colon cancer       Advanced Care Planning:  ACP discussion was held with the patient during this  "visit. Patient has an advance directive (not in EMR), copy requested.    Review of Systems    Compared to one year ago, the patient feels her physical health is the same.  Compared to one year ago, the patient feels her mental health is the same.    Reviewed chart for potential of high risk medication in the elderly: yes  Reviewed chart for potential of harmful drug interactions in the elderly:yes    Objective         Vitals:    09/16/20 1516   BP: 120/84   BP Location: Left arm   Patient Position: Sitting   Cuff Size: Adult   Pulse: 75   SpO2: 97%   Weight: 78.5 kg (173 lb)   Height: 157.5 cm (62\")       Body mass index is 31.64 kg/m².  Discussed the patient's BMI with her. The BMI is above average; BMI management plan is completed.    Physical Exam  Vitals signs and nursing note reviewed.   Cardiovascular:      Rate and Rhythm: Normal rate.   Pulmonary:      Effort: Pulmonary effort is normal.   Neurological:      General: No focal deficit present.      Mental Status: She is alert.   Psychiatric:         Mood and Affect: Mood normal.         Lab Results   Component Value Date     (H) 09/09/2020    CHLPL 210 (H) 09/09/2020    TRIG 111 09/09/2020    HDL 61 (H) 09/09/2020     (H) 09/09/2020    VLDL 22.2 09/09/2020        Assessment/Plan   Medicare Risks and Personalized Health Plan  CMS Preventative Services Quick Reference  Breast Cancer/Mammogram Screening  Cardiovascular risk  Colon Cancer Screening  Dementia/Memory   Depression/Dysphoria  Diabetic Lab Screening   Inactivity/Sedentary  Obesity/Overweight     The above risks/problems have been discussed with the patient.  Pertinent information has been shared with the patient in the After Visit Summary.  Follow up plans and orders are seen below in the Assessment/Plan Section.    Diagnoses and all orders for this visit:    1. Medicare annual wellness visit, subsequent (Primary)    2. Exposure to COVID-19 virus  -     SARS-CoV-2 Antibodies " ([facility])    3. Mixed hyperlipidemia  -     ezetimibe (ZETIA) 10 MG tablet; Take 1 tablet by mouth Daily.  Dispense: 90 tablet; Refill: 0  -     rosuvastatin (CRESTOR) 5 MG tablet; Take 1 tablet by mouth Daily.  Dispense: 90 tablet; Refill: 0  -     Lipid Panel; Future    4. Essential hypertension  -     valsartan (DIOVAN) 320 MG tablet; Take 1 tablet by mouth Daily. Replaces Avapro on back order  Dispense: 90 tablet; Refill: 0  -     CBC (No Diff); Future  -     Comprehensive Metabolic Panel; Future  -     TSH; Future  -     Urinalysis With Microscopic If Indicated (No Culture) - Urine, Clean Catch; Future    5. Class 1 obesity due to excess calories with serious comorbidity and body mass index (BMI) of 31.0 to 31.9 in adult  -     TSH; Future    reviewed labs ok  Follow Up:  Return in about 6 months (around 3/16/2021) for blood pressure.     An After Visit Summary and PPPS were given to the patient.

## 2020-09-17 PROBLEM — I10 UNCONTROLLED HYPERTENSION: Status: RESOLVED | Noted: 2019-03-26 | Resolved: 2020-09-17

## 2020-09-17 LAB — SARS-COV-2 AB SERPL QL IA: NEGATIVE

## 2020-09-19 DIAGNOSIS — I10 ESSENTIAL HYPERTENSION: ICD-10-CM

## 2020-09-21 RX ORDER — VALSARTAN 320 MG/1
320 TABLET ORAL DAILY
Qty: 30 TABLET | Refills: 0 | OUTPATIENT
Start: 2020-09-21

## 2020-10-30 ENCOUNTER — OFFICE VISIT (OUTPATIENT)
Dept: FAMILY MEDICINE CLINIC | Facility: CLINIC | Age: 65
End: 2020-10-30

## 2020-10-30 VITALS
BODY MASS INDEX: 32.2 KG/M2 | DIASTOLIC BLOOD PRESSURE: 88 MMHG | HEIGHT: 62 IN | OXYGEN SATURATION: 99 % | SYSTOLIC BLOOD PRESSURE: 140 MMHG | WEIGHT: 175 LBS | HEART RATE: 74 BPM

## 2020-10-30 DIAGNOSIS — F32.A ANXIETY AND DEPRESSION: Primary | ICD-10-CM

## 2020-10-30 DIAGNOSIS — F41.9 ANXIETY AND DEPRESSION: Primary | ICD-10-CM

## 2020-10-30 PROCEDURE — 99213 OFFICE O/P EST LOW 20 MIN: CPT | Performed by: FAMILY MEDICINE

## 2020-10-30 RX ORDER — DIAZEPAM 5 MG/1
5 TABLET ORAL DAILY PRN
Qty: 20 TABLET | Refills: 0 | Status: SHIPPED | OUTPATIENT
Start: 2020-10-30 | End: 2021-01-14 | Stop reason: SDUPTHER

## 2020-10-30 NOTE — PROGRESS NOTES
Subjective   Nery Jarrell is a 65 y.o. female who is here for   Chief Complaint   Patient presents with   • Anxiety   • Panic Attack   .     History of Present Illness   Anxiety: Patient complains of anxiety disorder.  She has the following symptoms: difficulty concentrating, feelings of losing control, irritable. Onset of symptoms was approximately 1 month ago, gradually worsening since that time. She denies current suicidal and homicidal ideation. Family history significant for anxiety, depression and substance abuse.Possible organic causes contributing are: none. Risk factors: negative life event her daughter and her 2 kids moved in with her. and her son who is a drug addict also moved back in with his daughter. So Nery is raising all her grandkids Previous treatment includes Effexor and none.  She complains of the following side effects from the treatment: none.        The following portions of the patient's history were reviewed and updated as appropriate: allergies, current medications, past family history, past medical history, past social history, past surgical history and problem list.    Review of Systems    Objective   Physical Exam  Vitals signs reviewed.   Cardiovascular:      Rate and Rhythm: Normal rate.   Pulmonary:      Effort: Pulmonary effort is normal.   Psychiatric:         Attention and Perception: Attention normal.         Mood and Affect: Mood is depressed.         Speech: Speech normal.         Behavior: Behavior normal.         Thought Content: Thought content normal.         Cognition and Memory: Cognition normal.         Judgment: Judgment normal.         Assessment/Plan   Diagnoses and all orders for this visit:    1. Anxiety and depression (Primary)  -     diazePAM (Valium) 5 MG tablet; Take 1 tablet by mouth Daily As Needed for Anxiety.  Dispense: 20 tablet; Refill: 0    may break in 1/2  Caution with drug in home with her son who is an addict      Patient has been prescribed  controlled medications.  Treatment discussed  DAT updated and reviewed.  Risk and Benefits discussed.  Per [place].    There are no Patient Instructions on file for this visit.    There are no discontinued medications.     No follow-ups on file.    Dr. John Parker  [place], [place]

## 2020-11-17 DIAGNOSIS — I10 ESSENTIAL HYPERTENSION: ICD-10-CM

## 2020-11-17 RX ORDER — HYDROCHLOROTHIAZIDE 25 MG/1
TABLET ORAL
Qty: 90 TABLET | Refills: 1 | Status: SHIPPED | OUTPATIENT
Start: 2020-11-17 | End: 2021-05-17

## 2020-11-18 ENCOUNTER — OFFICE VISIT (OUTPATIENT)
Dept: FAMILY MEDICINE CLINIC | Facility: CLINIC | Age: 65
End: 2020-11-18

## 2020-11-18 VITALS
OXYGEN SATURATION: 99 % | DIASTOLIC BLOOD PRESSURE: 80 MMHG | BODY MASS INDEX: 32.76 KG/M2 | HEART RATE: 68 BPM | SYSTOLIC BLOOD PRESSURE: 120 MMHG | HEIGHT: 62 IN | WEIGHT: 178 LBS

## 2020-11-18 DIAGNOSIS — N30.00 ACUTE CYSTITIS WITHOUT HEMATURIA: Primary | ICD-10-CM

## 2020-11-18 LAB
BILIRUB BLD-MCNC: NEGATIVE MG/DL
CLARITY, POC: CLEAR
COLOR UR: YELLOW
GLUCOSE UR STRIP-MCNC: NEGATIVE MG/DL
KETONES UR QL: NEGATIVE
LEUKOCYTE EST, POC: ABNORMAL
NITRITE UR-MCNC: NEGATIVE MG/ML
PH UR: 6.5 [PH] (ref 5–8)
PROT UR STRIP-MCNC: ABNORMAL MG/DL
RBC # UR STRIP: ABNORMAL /UL
SP GR UR: 1.01 (ref 1–1.03)
UROBILINOGEN UR QL: NORMAL

## 2020-11-18 PROCEDURE — 81003 URINALYSIS AUTO W/O SCOPE: CPT | Performed by: FAMILY MEDICINE

## 2020-11-18 PROCEDURE — 99213 OFFICE O/P EST LOW 20 MIN: CPT | Performed by: FAMILY MEDICINE

## 2020-11-18 RX ORDER — CIPROFLOXACIN 500 MG/1
500 TABLET, FILM COATED ORAL 2 TIMES DAILY
Qty: 6 TABLET | Refills: 0 | Status: SHIPPED | OUTPATIENT
Start: 2020-11-18 | End: 2021-03-19

## 2020-11-18 NOTE — PROGRESS NOTES
"Chief Complaint   Patient presents with   • Urinary Frequency     uncomfortable pain, not enough output        Urinary Tract Infection: Patient complains of burning with urination, frequency, incontinence, nocturnal enuresis, suprapubic pressure and urgency She has had symptoms for a few days. Patient also complains of ,. Patient denies back pain, fever and vaginal discharge. Patient does have a history of recurrent UTI.  Patient do not have a history of pyelonephritis.       Vitals:    11/18/20 1611   BP: 120/80   BP Location: Left arm   Patient Position: Sitting   Cuff Size: Adult   Pulse: 68   SpO2: 99%   Weight: 80.7 kg (178 lb)   Height: 157.5 cm (62\")     Gen: mildly ill appearing, alert  HEENT: WNL  Lung: regular RR, no audible wheeze  Heart: RR without murmur  Skin: no rash.  Abd: non tender  Flank: no CVA, no rash    Results for orders placed or performed in visit on 11/18/20   POCT urinalysis dipstick, automated    Specimen: Urine   Result Value Ref Range    Color Yellow Yellow, Straw, Dark Yellow, Leatha    Clarity, UA Clear Clear    Specific Gravity  1.015 1.005 - 1.030    pH, Urine 6.5 5.0 - 8.0    Leukocytes Moderate (2+) (A) Negative    Nitrite, UA Negative Negative    Protein, POC Trace (A) Negative mg/dL    Glucose, UA Negative Negative, 1000 mg/dL (3+) mg/dL    Ketones, UA Negative Negative    Urobilinogen, UA Normal Normal    Bilirubin Negative Negative    Blood, UA Moderate (A) Negative               Assessment/Plan   Diagnoses and all orders for this visit:    1. Acute cystitis without hematuria (Primary)  -     ciprofloxacin (Cipro) 500 MG tablet; Take 1 tablet by mouth 2 (Two) Times a Day.  Dispense: 6 tablet; Refill: 0  -     POCT urinalysis dipstick, automated             Tylenol or Advil as needed for pain, fever  Plenty of fluids  OTC Azo ok  Off work or school note given if needed.  Pros and cons of antibiotic use discussed    Dr. John Parker MD  [facility], " [place]  [facility]

## 2020-12-17 DIAGNOSIS — I10 ESSENTIAL HYPERTENSION: ICD-10-CM

## 2020-12-17 DIAGNOSIS — E78.2 MIXED HYPERLIPIDEMIA: ICD-10-CM

## 2020-12-17 RX ORDER — ROSUVASTATIN CALCIUM 5 MG/1
TABLET, COATED ORAL
Qty: 90 TABLET | Refills: 0 | Status: SHIPPED | OUTPATIENT
Start: 2020-12-17 | End: 2021-03-19 | Stop reason: SDUPTHER

## 2020-12-17 RX ORDER — VALSARTAN 320 MG/1
320 TABLET ORAL DAILY
Qty: 90 TABLET | Refills: 0 | Status: SHIPPED | OUTPATIENT
Start: 2020-12-17 | End: 2021-03-19 | Stop reason: SDUPTHER

## 2020-12-22 DIAGNOSIS — E78.2 MIXED HYPERLIPIDEMIA: ICD-10-CM

## 2020-12-23 RX ORDER — EZETIMIBE 10 MG/1
TABLET ORAL
Qty: 90 TABLET | Refills: 3 | Status: SHIPPED | OUTPATIENT
Start: 2020-12-23 | End: 2022-03-11 | Stop reason: SDUPTHER

## 2021-01-14 ENCOUNTER — TELEPHONE (OUTPATIENT)
Dept: FAMILY MEDICINE CLINIC | Facility: CLINIC | Age: 66
End: 2021-01-14

## 2021-01-14 DIAGNOSIS — F41.9 ANXIETY AND DEPRESSION: ICD-10-CM

## 2021-01-14 DIAGNOSIS — F32.A ANXIETY AND DEPRESSION: ICD-10-CM

## 2021-01-14 RX ORDER — IBUPROFEN 800 MG/1
800 TABLET ORAL EVERY 8 HOURS PRN
Qty: 90 TABLET | Refills: 1 | Status: SHIPPED | OUTPATIENT
Start: 2021-01-14 | End: 2021-09-22 | Stop reason: SDUPTHER

## 2021-01-14 RX ORDER — DIAZEPAM 5 MG/1
5 TABLET ORAL DAILY PRN
Qty: 20 TABLET | Refills: 0 | Status: SHIPPED | OUTPATIENT
Start: 2021-01-14 | End: 2021-03-19 | Stop reason: SDUPTHER

## 2021-03-19 ENCOUNTER — OFFICE VISIT (OUTPATIENT)
Dept: FAMILY MEDICINE CLINIC | Facility: CLINIC | Age: 66
End: 2021-03-19

## 2021-03-19 VITALS
DIASTOLIC BLOOD PRESSURE: 82 MMHG | HEART RATE: 66 BPM | WEIGHT: 188 LBS | SYSTOLIC BLOOD PRESSURE: 132 MMHG | BODY MASS INDEX: 34.6 KG/M2 | OXYGEN SATURATION: 99 % | HEIGHT: 62 IN | TEMPERATURE: 97.1 F

## 2021-03-19 DIAGNOSIS — F32.A ANXIETY AND DEPRESSION: ICD-10-CM

## 2021-03-19 DIAGNOSIS — E78.2 MIXED HYPERLIPIDEMIA: ICD-10-CM

## 2021-03-19 DIAGNOSIS — I10 ESSENTIAL HYPERTENSION: Primary | ICD-10-CM

## 2021-03-19 DIAGNOSIS — F41.9 ANXIETY AND DEPRESSION: ICD-10-CM

## 2021-03-19 PROCEDURE — 99214 OFFICE O/P EST MOD 30 MIN: CPT | Performed by: FAMILY MEDICINE

## 2021-03-19 RX ORDER — DIAZEPAM 5 MG/1
5 TABLET ORAL DAILY PRN
Qty: 20 TABLET | Refills: 0 | Status: SHIPPED | OUTPATIENT
Start: 2021-03-19 | End: 2021-08-02

## 2021-03-19 RX ORDER — VALSARTAN 320 MG/1
320 TABLET ORAL DAILY
Qty: 90 TABLET | Refills: 1 | Status: SHIPPED | OUTPATIENT
Start: 2021-03-19 | End: 2021-09-22 | Stop reason: SDUPTHER

## 2021-03-19 RX ORDER — ROSUVASTATIN CALCIUM 5 MG/1
5 TABLET, COATED ORAL DAILY
Qty: 90 TABLET | Refills: 1 | Status: SHIPPED | OUTPATIENT
Start: 2021-03-19 | End: 2022-03-11 | Stop reason: SDUPTHER

## 2021-03-19 NOTE — PROGRESS NOTES
"  Chief Complaint   Patient presents with   • Hypertension   • Anxiety       Subjective     Patient here for follow-up of elevated blood pressure.    She is not exercising and is not adherent to a low-salt diet.    Blood pressure is well controlled at home.   Cardiac symptoms: none.   Patient denies: chest pressure/discomfort, claudication, cough, dyspnea, exertional chest pressure/discomfort, fatigue, irregular heart beat, lower extremity edema, near-syncope, orthopnea, palpitations, paroxysmal nocturnal dyspnea, syncope and tachypnea.   Cardiovascular risk factors: advanced age (older than 55 for men, 65 for women), hypertension, obesity (BMI >= 30 kg/m2) and sedentary lifestyle.   Use of agents associated with hypertension: none.   History of target organ damage: none.  Patient is taking prescribed hypertension medications as prescribed without side effects.    Lots of stress at home, her adult son is in court ordered drug rehab in [facility].    The following portions of the patient's history were reviewed and updated as appropriate: allergies, current medications, past family history, past medical history, past social history, past surgical history and problem list.    Review of Systems  A comprehensive review of systems was negative except for: Behavioral/Psych: positive for anxiety         Vitals:    03/19/21 1249   BP: 132/82   BP Location: Left arm   Patient Position: Sitting   Cuff Size: Adult   Pulse: 66   Temp: 97.1 °F (36.2 °C)   TempSrc: Temporal   SpO2: 99%   Weight: 85.3 kg (188 lb)   Height: 157.5 cm (62\")     BP Readings from Last 3 Encounters:   03/19/21 132/82   11/18/20 120/80   10/30/20 140/88     Objective      Gen: alert, pleasant.  Neck: no bruit, no enlarged thyroid  Lungs: CTA  Heart: RR, no murmur  Feet: no edema  Pulses: intact       Assessment/Plan   Hypertension, normal blood pressure Evidence of target organ damage: none.    Diagnoses and all orders for this visit:    1. Essential " hypertension (Primary)  -     valsartan (DIOVAN) 320 MG tablet; Take 1 tablet by mouth Daily. Replaces Avapro on back order  Dispense: 90 tablet; Refill: 1    2. Anxiety and depression  -     diazePAM (Valium) 5 MG tablet; Take 1 tablet by mouth Daily As Needed for Anxiety.  Dispense: 20 tablet; Refill: 0    3. Mixed hyperlipidemia  -     rosuvastatin (CRESTOR) 5 MG tablet; Take 1 tablet by mouth Daily.  Dispense: 90 tablet; Refill: 1      Medication: no change.  Follow up: 6 months and as needed.    There are no Patient Instructions on file for this visit.  Medications Discontinued During This Encounter   Medication Reason   • ciprofloxacin (Cipro) 500 MG tablet *Therapy completed   • valsartan (DIOVAN) 320 MG tablet Reorder   • rosuvastatin (CRESTOR) 5 MG tablet Reorder   • diazePAM (Valium) 5 MG tablet Reorder        Return in about 6 months (around 9/19/2021) for Annual physical.    Limit salt  Limit alcoholic drinks to 1 a day  Limit caffeine to 1-2 servings a day    Dr. John Parker MD  [place], [place]  [facility].

## 2021-05-15 DIAGNOSIS — I10 ESSENTIAL HYPERTENSION: ICD-10-CM

## 2021-05-17 RX ORDER — HYDROCHLOROTHIAZIDE 25 MG/1
TABLET ORAL
Qty: 90 TABLET | Refills: 1 | Status: SHIPPED | OUTPATIENT
Start: 2021-05-17 | End: 2021-11-15

## 2021-08-02 DIAGNOSIS — F41.9 ANXIETY AND DEPRESSION: ICD-10-CM

## 2021-08-02 DIAGNOSIS — F32.A ANXIETY AND DEPRESSION: ICD-10-CM

## 2021-08-02 RX ORDER — DIAZEPAM 5 MG/1
TABLET ORAL
Qty: 20 TABLET | Refills: 0 | Status: SHIPPED | OUTPATIENT
Start: 2021-08-02 | End: 2021-12-30

## 2021-09-22 ENCOUNTER — OFFICE VISIT (OUTPATIENT)
Dept: FAMILY MEDICINE CLINIC | Facility: CLINIC | Age: 66
End: 2021-09-22

## 2021-09-22 VITALS
SYSTOLIC BLOOD PRESSURE: 146 MMHG | WEIGHT: 187 LBS | OXYGEN SATURATION: 97 % | HEART RATE: 61 BPM | HEIGHT: 62 IN | BODY MASS INDEX: 34.41 KG/M2 | DIASTOLIC BLOOD PRESSURE: 86 MMHG

## 2021-09-22 DIAGNOSIS — M19.90 ARTHRITIS: ICD-10-CM

## 2021-09-22 DIAGNOSIS — Z00.00 MEDICARE ANNUAL WELLNESS VISIT, SUBSEQUENT: Primary | ICD-10-CM

## 2021-09-22 DIAGNOSIS — I10 ESSENTIAL HYPERTENSION: ICD-10-CM

## 2021-09-22 DIAGNOSIS — Z12.31 ENCOUNTER FOR SCREENING MAMMOGRAM FOR MALIGNANT NEOPLASM OF BREAST: ICD-10-CM

## 2021-09-22 DIAGNOSIS — Z78.0 MENOPAUSE: ICD-10-CM

## 2021-09-22 PROCEDURE — G0439 PPPS, SUBSEQ VISIT: HCPCS | Performed by: FAMILY MEDICINE

## 2021-09-22 RX ORDER — IBUPROFEN 800 MG/1
800 TABLET ORAL EVERY 8 HOURS PRN
Qty: 90 TABLET | Refills: 1 | Status: SHIPPED | OUTPATIENT
Start: 2021-09-22 | End: 2022-01-14

## 2021-09-22 RX ORDER — VALSARTAN 320 MG/1
320 TABLET ORAL DAILY
Qty: 90 TABLET | Refills: 3 | Status: SHIPPED | OUTPATIENT
Start: 2021-09-22 | End: 2022-08-17 | Stop reason: SDUPTHER

## 2021-09-22 NOTE — PROGRESS NOTES
The ABCs of the Annual Wellness Visit  Subsequent Medicare Wellness Visit    Chief Complaint   Patient presents with   • Medicare Wellness-subsequent      Subjective    History of Present Illness:  Nery Jarrell is a 66 y.o. female who presents for a Subsequent Medicare Wellness Visit.    The following portions of the patient's history were reviewed and   updated as appropriate: allergies, current medications, past family history, past medical history, past social history, past surgical history and problem list.    Compared to one year ago, the patient feels her physical   health is worse.    Compared to one year ago, the patient feels her mental   health is worse.    Recent Hospitalizations:  She was not admitted to the hospital during the last year.       Current Medical Providers:  Patient Care Team:  John Parker MD as PCP - General  [facility], Luz Haque MD as Consulting Physician (Obstetrics and Gynecology)  [facility], Iggy Díaz MD as Consulting Physician (Gastroenterology)    Outpatient Medications Prior to Visit   Medication Sig Dispense Refill   • diazePAM (VALIUM) 5 MG tablet TAKE 1 TABLET BY MOUTH EVERY DAY AS NEEDED FOR ANXIETY 20 tablet 0   • ezetimibe (ZETIA) 10 MG tablet TAKE 1 TABLET BY MOUTH EVERY DAY 90 tablet 3   • hydroCHLOROthiazide (HYDRODIURIL) 25 MG tablet TAKE 1 TABLET BY MOUTH EVERY DAY 90 tablet 1   • magnesium gluconate (MAGONATE) 500 MG tablet Take 27 mg by mouth 2 (Two) Times a Day.     • potassium chloride (K-DUR,KLOR-CON) 20 MEQ CR tablet Take 1 tablet by mouth Daily. 5 tablet 0   • rosuvastatin (CRESTOR) 5 MG tablet Take 1 tablet by mouth Daily. 90 tablet 1   • Vitamin D, Cholecalciferol, 1000 units capsule Take 5,000 Units by mouth.     • ibuprofen (ADVIL,MOTRIN) 800 MG tablet Take 1 tablet by mouth Every 8 (Eight) Hours As Needed for Mild Pain . 90 tablet 1   • valsartan (DIOVAN) 320 MG tablet Take 1 tablet by mouth Daily. Replaces Avapro on back order 90 tablet 1  "    No facility-administered medications prior to visit.       No opioid medication identified on active medication list. I have reviewed chart for other potential  high risk medication/s and harmful drug interactions in the elderly.          Aspirin is not on active medication list.  Aspirin use is not indicated based on review of current medical condition/s. Risk of harm outweighs potential benefits.  .    Patient Active Problem List   Diagnosis   • Anxiety and depression   • Mixed hyperlipidemia   • Essential hypertension   • Class 1 obesity due to excess calories with serious comorbidity and body mass index (BMI) of 31.0 to 31.9 in adult   • B12 deficiency   • Avitaminosis D   • Routine adult health maintenance   • Arthritis   • Medicare annual wellness visit, subsequent   • Screen for colon cancer   • Encounter for screening mammogram for malignant neoplasm of breast   • Menopause     Advance Care Planning  Advance Directive is not on file.  ACP discussion was held with the patient during this visit. Patient does not have an advance directive, declines further assistance.          Objective    Vitals:    09/22/21 1257   BP: 146/86   BP Location: Left arm   Patient Position: Sitting   Cuff Size: Adult   Pulse: 61   SpO2: 97%   Weight: 84.8 kg (187 lb)   Height: 157.5 cm (62\")     BMI Readings from Last 1 Encounters:   09/22/21 34.20 kg/m²   BMI is above normal parameters. Recommendations include: nutrition counseling    Does the patient have evidence of cognitive impairment? No    Physical Exam  Cardiovascular:      Rate and Rhythm: Normal rate.   Pulmonary:      Effort: Pulmonary effort is normal.   Psychiatric:         Mood and Affect: Mood normal.       Lab Results   Component Value Date     (H) 09/16/2021    CHLPL 253 (H) 09/16/2021    TRIG 96 09/16/2021    HDL 62 (H) 09/16/2021     (H) 09/16/2021    VLDL 17 09/16/2021            HEALTH RISK ASSESSMENT    Smoking Status:  Social History "     Tobacco Use   Smoking Status Former Smoker   • Years: 11.00   • Types: Cigarettes   • Quit date:    • Years since quittin.7   Smokeless Tobacco Never Used     Alcohol Consumption:  Social History     Substance and Sexual Activity   Alcohol Use Yes    Comment: 0-1 per month     Fall Risk Screen:    STONE Fall Risk Assessment was completed, and patient is at LOW risk for falls.Assessment completed on:2021    Depression Screening:  PHQ-2/PHQ-9 Depression Screening 2021   Little interest or pleasure in doing things 1   Feeling down, depressed, or hopeless 1   Trouble falling or staying asleep, or sleeping too much 1   Feeling tired or having little energy 1   Poor appetite or overeating 1   Feeling bad about yourself - or that you are a failure or have let yourself or your family down 1   Trouble concentrating on things, such as reading the newspaper or watching television 0   Moving or speaking so slowly that other people could have noticed. Or the opposite - being so fidgety or restless that you have been moving around a lot more than usual 0   Thoughts that you would be better off dead, or of hurting yourself in some way 0   Total Score 6   If you checked off any problems, how difficult have these problems made it for you to do your work, take care of things at home, or get along with other people? Somewhat difficult       Health Habits and Functional and Cognitive Screening:  Functional & Cognitive Status 2021   Do you have difficulty preparing food and eating? No   Do you have difficulty bathing yourself, getting dressed or grooming yourself? No   Do you have difficulty using the toilet? No   Do you have difficulty moving around from place to place? No   Do you have trouble with steps or getting out of a bed or a chair? No   Current Diet Well Balanced Diet   Dental Exam Up to date   Eye Exam Up to date   Exercise (times per week) 0 times per week   Current Exercises Include No Regular  Exercise   Current Exercise Activities Include -   Do you need help using the phone?  No   Are you deaf or do you have serious difficulty hearing?  No   Do you need help with transportation? No   Do you need help shopping? No   Do you need help preparing meals?  No   Do you need help with housework?  No   Do you need help with laundry? No   Do you need help taking your medications? No   Do you need help managing money? No   Do you ever drive or ride in a car without wearing a seat belt? No   Have you felt unusual stress, anger or loneliness in the last month? Yes   Who do you live with? Spouse   If you need help, do you have trouble finding someone available to you? No   Have you been bothered in the last four weeks by sexual problems? No   Do you have difficulty concentrating, remembering or making decisions? Yes       Age-appropriate Screening Schedule:  Refer to the list below for future screening recommendations based on patient's age, sex and/or medical conditions. Orders for these recommended tests are listed in the plan section. The patient has been provided with a written plan.    Health Maintenance   Topic Date Due   • DXA SCAN  Never done   • MAMMOGRAM  09/04/2020   • PAP SMEAR  09/04/2021   • ZOSTER VACCINE (2 of 3) 09/22/2021 (Originally 5/21/2019)   • INFLUENZA VACCINE  10/01/2021   • LIPID PANEL  09/16/2022   • TDAP/TD VACCINES (3 - Td or Tdap) 07/15/2023              Assessment/Plan   CMS Preventative Services Quick Reference  Risk Factors Identified During Encounter  Cardiovascular Disease  Dementia/Memory   Depression/Dysphoria  Obesity/Overweight   The above risks/problems have been discussed with the patient.  Follow up actions/plans if indicated are seen below in the Assessment/Plan Section.  Pertinent information has been shared with the patient in the After Visit Summary.    Diagnoses and all orders for this visit:    1. Medicare annual wellness visit, subsequent (Primary)    2. Encounter for  screening mammogram for malignant neoplasm of breast  -     Mammo screening digital tomosynthesis bilateral w CAD; Future    3. Menopause  -     DEXA Bone Density Axial; Future    4. Essential hypertension  -     valsartan (DIOVAN) 320 MG tablet; Take 1 tablet by mouth Daily.  Dispense: 90 tablet; Refill: 3    5. Arthritis  -     ibuprofen (ADVIL,MOTRIN) 800 MG tablet; Take 1 tablet by mouth Every 8 (Eight) Hours As Needed for Mild Pain .  Dispense: 90 tablet; Refill: 1    Reviewed labs.  Her cholesterol went up.  She was taking the Zetia and Crestor on an alternating basis.  But she read someplace that statins cause memory loss and dementia.  She herself has been suffering from poor memory.  But during her home trials with her reduced dose there was no change in her memory.  I asked her to go back to the standard once a day dosing.    She does not want any vaccinations.    Does consent for mammogram and bone density.    Refill her medication as above.    Lots of stress at home as she is now the guardian of her 11-year-old granddaughter.  As the parents are addicted to drugs    Follow Up:   Return in about 6 months (around 3/22/2022) for blood pressure.     An After Visit Summary and PPPS were made available to the patient.

## 2021-10-01 ENCOUNTER — HOSPITAL ENCOUNTER (OUTPATIENT)
Dept: BONE DENSITY | Facility: HOSPITAL | Age: 66
Discharge: HOME | End: 2021-10-01

## 2021-10-01 ENCOUNTER — HOSPITAL ENCOUNTER (OUTPATIENT)
Dept: MAMMOGRAPHY | Facility: HOSPITAL | Age: 66
Discharge: HOME | End: 2021-10-01

## 2021-10-01 DIAGNOSIS — Z12.31 ENCOUNTER FOR SCREENING MAMMOGRAM FOR MALIGNANT NEOPLASM OF BREAST: ICD-10-CM

## 2021-10-01 DIAGNOSIS — Z78.0 MENOPAUSE: ICD-10-CM

## 2021-10-01 PROCEDURE — 77080 DXA BONE DENSITY AXIAL: CPT

## 2021-10-01 PROCEDURE — 77067 SCR MAMMO BI INCL CAD: CPT

## 2021-10-01 PROCEDURE — 77063 BREAST TOMOSYNTHESIS BI: CPT

## 2021-11-14 DIAGNOSIS — I10 ESSENTIAL HYPERTENSION: ICD-10-CM

## 2021-11-15 RX ORDER — HYDROCHLOROTHIAZIDE 25 MG/1
TABLET ORAL
Qty: 90 TABLET | Refills: 1 | Status: SHIPPED | OUTPATIENT
Start: 2021-11-15 | End: 2022-03-11 | Stop reason: SDUPTHER

## 2021-12-15 ENCOUNTER — TELEPHONE (OUTPATIENT)
Dept: FAMILY MEDICINE CLINIC | Facility: CLINIC | Age: 66
End: 2021-12-15

## 2021-12-15 DIAGNOSIS — Z20.822 CLOSE EXPOSURE TO COVID-19 VIRUS: Primary | ICD-10-CM

## 2021-12-15 NOTE — TELEPHONE ENCOUNTER
PATIENT STATES HER GRANDDAUGHTER THAT LIVES  WITH HER JUST TESTED POSITIVE FOR COVID 12/14. PATIENT IS WONDERING WHAT SHE NEEDS TO DO AS A SENIOR. PATIENT WOULD LIKE TO SPEAK  MA OR DOCTOR     CALLBACK [phone]

## 2021-12-15 NOTE — TELEPHONE ENCOUNTER
Spoke w/ patient she said she is feeling ok does have a slight sore throat and some sinus drainage but she was unsure what she needs to do. I advised her to stay home and quarantine, if she starts developing worsening symptoms in the next few days then she can come here next week to be seen/swabbed. Please advise     Have her come in tomorrow for a parking lot covid swab.    John Parker MD

## 2021-12-20 ENCOUNTER — TELEPHONE (OUTPATIENT)
Dept: FAMILY MEDICINE CLINIC | Facility: CLINIC | Age: 66
End: 2021-12-20

## 2021-12-20 NOTE — TELEPHONE ENCOUNTER
Patient has mild symptoms but is confirmed case from 12/16/2021     Patient is set up for  mychart visit with provider tani zepeda .     Patient had some questions and concerns with plan of care heading into visit     Patients phone number is correct in chart

## 2021-12-21 ENCOUNTER — TELEMEDICINE (OUTPATIENT)
Dept: FAMILY MEDICINE CLINIC | Facility: CLINIC | Age: 66
End: 2021-12-21

## 2021-12-21 DIAGNOSIS — U07.1 COVID-19 VIRUS INFECTION: Primary | ICD-10-CM

## 2021-12-21 PROCEDURE — 99421 OL DIG E/M SVC 5-10 MIN: CPT | Performed by: NURSE PRACTITIONER

## 2021-12-21 RX ORDER — DEXTROMETHORPHAN HYDROBROMIDE AND PROMETHAZINE HYDROCHLORIDE 15; 6.25 MG/5ML; MG/5ML
5 SYRUP ORAL EVERY 6 HOURS PRN
Qty: 180 ML | Refills: 0 | Status: SHIPPED | OUTPATIENT
Start: 2021-12-21 | End: 2022-08-17

## 2021-12-21 NOTE — PATIENT INSTRUCTIONS
Casirivimab; Imdevimab Injection  What is this medicine?  CASIRIVIMAB; IMDEVIMAB (ka SIR iv' i mab; im DEV i mab) is a monoclonal antibody used to treat COVID-19 in patients who are not hospitalized. It is also used to reduce the risk of getting COVID-19. It is for patients at risk of getting severe symptoms of COVID-19. It may decrease the risk of developing severe symptoms of COVID-19. It may also decrease the chance of going to the hospital. This medicine is not approved by the FDA. The FDA has authorized emergency use of this medicine during the COVID-19 pandemic.  This medicine may be used for other purposes; ask your health care provider or pharmacist if you have questions.  COMMON BRAND NAME(S): REGEN-COV  What should I tell my health care provider before I take this medicine?  They need to know if you have any of these conditions:  · any allergies  · any serious illness  · have received a COVID-19 vaccine  · an unusual or allergic reaction to casirivimab, imdevimab, other medicines, foods, dyes, or preservatives  · pregnant or trying to get pregnant  · breast-feeding  How should I use this medicine?  This medicine is injected into a vein or under the skin. It is given by a health care provider in a hospital or clinic setting.  Talk to your health care provider about the use of this medicine in children. While it may be given to children as young as 12 years for selected conditions, precautions do apply.  Overdosage: If you think you have taken too much of this medicine contact a poison control center or emergency room at once.  NOTE: This medicine is only for you. Do not share this medicine with others.  What if I miss a dose?  Keep appointments for follow-up doses. It is important not to miss your dose. Call your health care provider if you are unable to keep an appointment.  What may interact with this medicine?  · COVID-19 vaccines  This list may not describe all possible interactions. Give your health  care provider a list of all the medicines, herbs, non-prescription drugs, or dietary supplements you use. Also tell them if you smoke, drink alcohol, or use illegal drugs. Some items may interact with your medicine.  What should I watch for while using this medicine?  Your condition will be monitored carefully while you are receiving this medicine. Visit your health care provider for regular checks on your progress. Tell your health care provider if your symptoms do not start to get better or if they get worse.  After receiving this medicine, wait at least 90 days before getting the COVID-19 vaccine. If you have already received your first dose of the COVID-19 vaccine, wait at least 90 days after getting this medicine before getting your second dose of vaccine.  What side effects may I notice from receiving this medicine?  Side effects that you should report to your doctor or health care professional as soon as possible:  · allergic reactions (skin rash, itching or hives, swelling of the face, lips, or tongue)  · infusion-related reactions (chest pain or chest tightness, chills, fever, flushing, stomach pain, trouble breathing)  Side effects that usually do not require medical attention (report these to your doctor or health care professional if they continue or are bothersome):  · nausea  · pain, redness, or irritation at site where injected  This list may not describe all possible side effects. Call your doctor for medical advice about side effects. You may report side effects to FDA at 3-839-FDA-8788.  Where should I keep my medicine?  This medicine is given in a hospital or clinic. It will not be stored at home.  NOTE: This sheet is a summary. It may not cover all possible information. If you have questions about this medicine, talk to your doctor, pharmacist, or health care provider.  © 2021 [employer] (2021-08-02 14:09:12)  How to Quarantine at Home  Information for Patients and Families    These  instructions are for people with confirmed or suspected COVID-19 who do not need to be hospitalized and those with confirmed COVID-19 who were hospitalized and discharged to care for themselves at home.    If you were tested through the Health Department  The Health Department will monitor your wellbeing.  If it is determined that you do not need to be hospitalized and can be isolated at home, you will be monitored by staff from your local or state health department.     If you were tested through a [facility]  You will need to monitor yourself and report changes in your symptoms to your doctor.  See the section below called Monitor Your Symptoms.    Follow these steps until a healthcare provider or local or state health department says you can return to your normal activities.    Stay home except to get medical care  • Restrict activities outside your home, except for getting medical care.   • Do not go to work, school, or public areas.   • Avoid using public transportation, ride-sharing, or taxis.    Separate yourself from other people and animals in your home  People  As much as possible, you should stay in a specific room and away from other people in your home. Also, you should use a separate bathroom, if available.    Animals  You should restrict contact with pets and other animals while you are sick with COVID-19, just like you would around other people. When possible, have another member of your household care for your animals while you are sick. If you are sick with COVID-19, avoid contact with your pet, including petting, snuggling, being kissed or licked, and sharing food. If you must care for your pet or be around animals while you are sick, wash your hands before and after you interact with pets and wear a facemask. See COVID-19 and Animals for more information.    Call ahead before visiting your doctor  If you have a medical appointment, call the healthcare provider and tell them that you have or  may have COVID-19. This information will help the healthcare provider’s office take steps to keep other people from getting infected or exposed.    Wear a facemask  You should wear a facemask when you are around other people (e.g., sharing a room or vehicle) or pets and before you enter a healthcare provider’s office.     If you are not able to wear a facemask (for example, because it causes trouble breathing), then people who live with you should not stay in the same room with you, or they should wear a facemask if they enter your room.    Cover your coughs and sneezes  • Cover your mouth and nose with a tissue when you cough or sneeze.   • Throw used tissues in a lined trash can.   • Immediately wash your hands with soap and water for at least 20 seconds or, if soap and water are not available, clean your hands with an alcohol-based hand  that contains at least 60% alcohol.    Clean your hands often  • Wash your hands often with soap and water for at least 20 seconds, especially after blowing your nose, coughing, or sneezing; going to the bathroom; and before eating or preparing food.     • If soap and water are not readily available, use an alcohol-based hand  with at least 60% alcohol, covering all surfaces of your hands and rubbing them together until they feel dry.    • Soap and water are the best option if hands are visibly dirty. Avoid touching your eyes, nose, and mouth with unwashed hands.    Avoid sharing personal household items  • You should not share dishes, drinking glasses, cups, eating utensils, towels, or bedding with other people or pets in your home.   • After using these items, they should be washed thoroughly with soap and water.    Clean all “high-touch” surfaces everyday  • High touch surfaces include counters, tabletops, doorknobs, bathroom fixtures, toilets, phones, keyboards, tablets, and bedside tables.   • Also, clean any surfaces that may have blood, stool, or body  fluids on them.   • Use a household cleaning spray or wipe, according to the label instructions. Labels contain instructions for safe and effective use of the cleaning product, including precautions you should take when applying the product, such as wearing gloves and making sure you have good ventilation during use of the product.    Monitor your symptoms  • Seek prompt medical attention if your illness is worsening (e.g., difficulty breathing).   • Before seeking care, call your healthcare provider and tell them that you have, or are being evaluated for, COVID-19.   • Put on a facemask before you enter the facility.     • These steps will help the healthcare provider’s office to keep other people in the office or waiting room from getting infected or exposed.   • Persons who are placed under active monitoring or facilitated self-monitoring should follow instructions provided by their local health department or occupational health professionals, as appropriate.  • If you have a medical emergency and need to call 911, notify the dispatch personnel that you have, or are being evaluated for COVID-19. If possible, put on a facemask before emergency medical services arrive.    Discontinuing home isolation  Patients with confirmed COVID-19 should remain under home isolation precautions until the risk of secondary transmission to others is thought to be low. The decision to discontinue home isolation precautions should be made on a case-by-case basis, in consultation with healthcare providers and state and local health departments.    The below content are for household members, intimate partners, and caregivers of a patient with symptomatic laboratory-confirmed COVID-19 or a patient under investigation:    Household members, intimate partners, and caregivers may have close contact with a person with symptomatic, laboratory-confirmed COVID-19 or a person under investigation.     Close contacts should monitor their health;  they should call their healthcare provider right away if they develop symptoms suggestive of COVID-19 (e.g., fever, cough, shortness of breath)     Close contacts should also follow these recommendations:  • Make sure that you understand and can help the patient follow their healthcare provider’s instructions for medication(s) and care. You should help the patient with basic needs in the home and provide support for getting groceries, prescriptions, and other personal needs.  • Monitor the patient’s symptoms. If the patient is getting sicker, call his or her healthcare provider and tell them that the patient has laboratory-confirmed COVID-19. This will help the healthcare provider’s office take steps to keep other people in the office or waiting room from getting infected. Ask the healthcare provider to call the local or state health department for additional guidance. If the patient has a medical emergency and you need to call 911, notify the dispatch personnel that the patient has, or is being evaluated for COVID-19.  • Household members should stay in another room or be  from the patient as much as possible. Household members should use a separate bedroom and bathroom, if available.  • Prohibit visitors who do not have an essential need to be in the home.  • Household members should care for any pets in the home. Do not handle pets or other animals while sick.  For more information, see COVID-19 and Animals.  • Make sure that shared spaces in the home have good air flow, such as by an air conditioner or an opened window, weather permitting.  • Perform hand hygiene frequently. Wash your hands often with soap and water for at least 20 seconds or use an alcohol-based hand  that contains 60 to 95% alcohol, covering all surfaces of your hands and rubbing them together until they feel dry. Soap and water should be used preferentially if hands are visibly dirty.  • Avoid touching your eyes, nose, and  mouth with unwashed hands.  • The patient should wear a facemask when you are around other people. If the patient is not able to wear a facemask (for example, because it causes trouble breathing), you, as the caregiver, should wear a mask when you are in the same room as the patient.  • Wear a disposable facemask and gloves when you touch or have contact with the patient’s blood, stool, or body fluids, such as saliva, sputum, nasal mucus, vomit, or urine.   o Throw out disposable facemasks and gloves after using them. Do not reuse.  o When removing personal protective equipment, first remove and dispose of gloves. Then, immediately clean your hands with soap and water or alcohol-based hand . Next, remove and dispose of facemask, and immediately clean your hands again with soap and water or alcohol-based hand .  • Avoid sharing household items with the patient. You should not share dishes, drinking glasses, cups, eating utensils, towels, bedding, or other items. After the patient uses these items, you should wash them thoroughly (see below “Wash laundry thoroughly”).  • Clean all “high-touch” surfaces, such as counters, tabletops, doorknobs, bathroom fixtures, toilets, phones, keyboards, tablets, and bedside tables, every day. Also, clean any surfaces that may have blood, stool, or body fluids on them.   o Use a household cleaning spray or wipe, according to the label instructions. Labels contain instructions for safe and effective use of the cleaning product including precautions you should take when applying the product, such as wearing gloves and making sure you have good ventilation during use of the product.  • Wash laundry thoroughly.   o Immediately remove and wash clothes or bedding that have blood, stool, or body fluids on them.  o Wear disposable gloves while handling soiled items and keep soiled items away from your body. Clean your hands (with soap and water or an alcohol-based hand  ) immediately after removing your gloves.  o Read and follow directions on labels of laundry or clothing items and detergent. In general, using a normal laundry detergent according to washing machine instructions and dry thoroughly using the warmest temperatures recommended on the clothing label.  • Place all used disposable gloves, facemasks, and other contaminated items in a lined container before disposing of them with other household waste. Clean your hands (with soap and water or an alcohol-based hand ) immediately after handling these items. Soap and water should be used preferentially if hands are visibly dirty.  • Discuss any additional questions with your state or local health department or healthcare provider.    Adapted from information provided by the Centers for Disease Control and Prevention.  For more information, visit https://www.cdc.gov/coronavirus/2019-ncov/hcp/guidance-prevent-spread.html

## 2021-12-21 NOTE — PROGRESS NOTES
Nery Jarrell is a 66 y.o. who presents today for an E-visit with complaints of COVID.      You have chosen to receive care through a telehealth visit.  Do you consent to use a video/audio connection for your medical care today? Yes    Nery Jarrell was located at their residence.  FREEDOM Cook was located at [facility] office    Participants:  Patient, granddaughter, and provider    Start time:  1638   End time:  1648  Time spent caring for the patient was 5 - 10 min.    Chief Complaint   Patient presents with   • Exposure To Known Illness       Upper Respiratory Infection: Patient complains of symptoms of a URI. Symptoms include congestion, cough and fever. Onset of symptoms was 6 days ago, gradually worsening since that time. She also c/o achiness, congestion, headache described as fatigue, loss of taste and smell.  , non productive cough and .nausea for the past 6 days .  She is drinking moderate amounts of fluids. Evaluation to date: none. Treatment to date: cough suppressants.  Ill contacts at home or school or work discussed.   recently tested positive along with granddaughter.    Onset of symptoms 12/15/2021  Tested positive on 12/16/2021  Interested on monoclonal antibody infusion.      The following portions of the patient's history were reviewed and updated as appropriate: allergies, current medications, past family history, past medical history, past social history, past surgical history and problem list.    There were no vitals filed for this visit.  Gen: Ill appearing, laying in bed, coughing during video visit  NAD    Assessment/Plan   Diagnoses and all orders for this visit:    1. COVID-19 virus infection (Primary)  -     promethazine-dextromethorphan (PROMETHAZINE-DM) 6.25-15 MG/5ML syrup; Take 5 mL by mouth Every 6 (Six) Hours As Needed for Cough.  Dispense: 180 mL; Refill: 0     Order faxed for monoclonal antibody infusion.           Patient Instructions   Casirivimab; Imdevimab  Injection  What is this medicine?  CASIRIVIMAB; IMDEVIMAB (dixie SIR iv' i mab; im DEV i mab) is a monoclonal antibody used to treat COVID-19 in patients who are not hospitalized. It is also used to reduce the risk of getting COVID-19. It is for patients at risk of getting severe symptoms of COVID-19. It may decrease the risk of developing severe symptoms of COVID-19. It may also decrease the chance of going to the hospital. This medicine is not approved by the FDA. The FDA has authorized emergency use of this medicine during the COVID-19 pandemic.  This medicine may be used for other purposes; ask your health care provider or pharmacist if you have questions.  COMMON BRAND NAME(S): REGEN-COV  What should I tell my health care provider before I take this medicine?  They need to know if you have any of these conditions:  · any allergies  · any serious illness  · have received a COVID-19 vaccine  · an unusual or allergic reaction to casirivimab, imdevimab, other medicines, foods, dyes, or preservatives  · pregnant or trying to get pregnant  · breast-feeding  How should I use this medicine?  This medicine is injected into a vein or under the skin. It is given by a health care provider in a hospital or clinic setting.  Talk to your health care provider about the use of this medicine in children. While it may be given to children as young as 12 years for selected conditions, precautions do apply.  Overdosage: If you think you have taken too much of this medicine contact a poison control center or emergency room at once.  NOTE: This medicine is only for you. Do not share this medicine with others.  What if I miss a dose?  Keep appointments for follow-up doses. It is important not to miss your dose. Call your health care provider if you are unable to keep an appointment.  What may interact with this medicine?  · COVID-19 vaccines  This list may not describe all possible interactions. Give your health care provider a list of  all the medicines, herbs, non-prescription drugs, or dietary supplements you use. Also tell them if you smoke, drink alcohol, or use illegal drugs. Some items may interact with your medicine.  What should I watch for while using this medicine?  Your condition will be monitored carefully while you are receiving this medicine. Visit your health care provider for regular checks on your progress. Tell your health care provider if your symptoms do not start to get better or if they get worse.  After receiving this medicine, wait at least 90 days before getting the COVID-19 vaccine. If you have already received your first dose of the COVID-19 vaccine, wait at least 90 days after getting this medicine before getting your second dose of vaccine.  What side effects may I notice from receiving this medicine?  Side effects that you should report to your doctor or health care professional as soon as possible:  · allergic reactions (skin rash, itching or hives, swelling of the face, lips, or tongue)  · infusion-related reactions (chest pain or chest tightness, chills, fever, flushing, stomach pain, trouble breathing)  Side effects that usually do not require medical attention (report these to your doctor or health care professional if they continue or are bothersome):  · nausea  · pain, redness, or irritation at site where injected  This list may not describe all possible side effects. Call your doctor for medical advice about side effects. You may report side effects to FDA at 4-676-FDA-5714.  Where should I keep my medicine?  This medicine is given in a hospital or clinic. It will not be stored at home.  NOTE: This sheet is a summary. It may not cover all possible information. If you have questions about this medicine, talk to your doctor, pharmacist, or health care provider.  © 2021 [employer] (2021-08-02 14:09:12)  How to Quarantine at Home  Information for Patients and Families    These instructions are for  people with confirmed or suspected COVID-19 who do not need to be hospitalized and those with confirmed COVID-19 who were hospitalized and discharged to care for themselves at home.    If you were tested through the Health Department  The Health Department will monitor your wellbeing.  If it is determined that you do not need to be hospitalized and can be isolated at home, you will be monitored by staff from your local or state health department.     If you were tested through a Commercial Lab  You will need to monitor yourself and report changes in your symptoms to your doctor.  See the section below called Monitor Your Symptoms.    Follow these steps until a healthcare provider or local or state health department says you can return to your normal activities.    Stay home except to get medical care  • Restrict activities outside your home, except for getting medical care.   • Do not go to work, school, or public areas.   • Avoid using public transportation, ride-sharing, or taxis.    Separate yourself from other people and animals in your home  People  As much as possible, you should stay in a specific room and away from other people in your home. Also, you should use a separate bathroom, if available.    Animals  You should restrict contact with pets and other animals while you are sick with COVID-19, just like you would around other people. When possible, have another member of your household care for your animals while you are sick. If you are sick with COVID-19, avoid contact with your pet, including petting, snuggling, being kissed or licked, and sharing food. If you must care for your pet or be around animals while you are sick, wash your hands before and after you interact with pets and wear a facemask. See COVID-19 and Animals for more information.    Call ahead before visiting your doctor  If you have a medical appointment, call the healthcare provider and tell them that you have or may have COVID-19.  This information will help the healthcare provider’s office take steps to keep other people from getting infected or exposed.    Wear a facemask  You should wear a facemask when you are around other people (e.g., sharing a room or vehicle) or pets and before you enter a healthcare provider’s office.     If you are not able to wear a facemask (for example, because it causes trouble breathing), then people who live with you should not stay in the same room with you, or they should wear a facemask if they enter your room.    Cover your coughs and sneezes  • Cover your mouth and nose with a tissue when you cough or sneeze.   • Throw used tissues in a lined trash can.   • Immediately wash your hands with soap and water for at least 20 seconds or, if soap and water are not available, clean your hands with an alcohol-based hand  that contains at least 60% alcohol.    Clean your hands often  • Wash your hands often with soap and water for at least 20 seconds, especially after blowing your nose, coughing, or sneezing; going to the bathroom; and before eating or preparing food.     • If soap and water are not readily available, use an alcohol-based hand  with at least 60% alcohol, covering all surfaces of your hands and rubbing them together until they feel dry.    • Soap and water are the best option if hands are visibly dirty. Avoid touching your eyes, nose, and mouth with unwashed hands.    Avoid sharing personal household items  • You should not share dishes, drinking glasses, cups, eating utensils, towels, or bedding with other people or pets in your home.   • After using these items, they should be washed thoroughly with soap and water.    Clean all “high-touch” surfaces everyday  • High touch surfaces include counters, tabletops, doorknobs, bathroom fixtures, toilets, phones, keyboards, tablets, and bedside tables.   • Also, clean any surfaces that may have blood, stool, or body fluids on them.    • Use a household cleaning spray or wipe, according to the label instructions. Labels contain instructions for safe and effective use of the cleaning product, including precautions you should take when applying the product, such as wearing gloves and making sure you have good ventilation during use of the product.    Monitor your symptoms  • Seek prompt medical attention if your illness is worsening (e.g., difficulty breathing).   • Before seeking care, call your healthcare provider and tell them that you have, or are being evaluated for, COVID-19.   • Put on a facemask before you enter the facility.     • These steps will help the healthcare provider’s office to keep other people in the office or waiting room from getting infected or exposed.   • Persons who are placed under active monitoring or facilitated self-monitoring should follow instructions provided by their local health department or occupational health professionals, as appropriate.  • If you have a medical emergency and need to call 911, notify the dispatch personnel that you have, or are being evaluated for COVID-19. If possible, put on a facemask before emergency medical services arrive.    Discontinuing home isolation  Patients with confirmed COVID-19 should remain under home isolation precautions until the risk of secondary transmission to others is thought to be low. The decision to discontinue home isolation precautions should be made on a case-by-case basis, in consultation with healthcare providers and state and local health departments.    The below content are for household members, intimate partners, and caregivers of a patient with symptomatic laboratory-confirmed COVID-19 or a patient under investigation:    Household members, intimate partners, and caregivers may have close contact with a person with symptomatic, laboratory-confirmed COVID-19 or a person under investigation.     Close contacts should monitor their health; they should  call their healthcare provider right away if they develop symptoms suggestive of COVID-19 (e.g., fever, cough, shortness of breath)     Close contacts should also follow these recommendations:  • Make sure that you understand and can help the patient follow their healthcare provider’s instructions for medication(s) and care. You should help the patient with basic needs in the home and provide support for getting groceries, prescriptions, and other personal needs.  • Monitor the patient’s symptoms. If the patient is getting sicker, call his or her healthcare provider and tell them that the patient has laboratory-confirmed COVID-19. This will help the healthcare provider’s office take steps to keep other people in the office or waiting room from getting infected. Ask the healthcare provider to call the local or state health department for additional guidance. If the patient has a medical emergency and you need to call 911, notify the dispatch personnel that the patient has, or is being evaluated for COVID-19.  • Household members should stay in another room or be  from the patient as much as possible. Household members should use a separate bedroom and bathroom, if available.  • Prohibit visitors who do not have an essential need to be in the home.  • Household members should care for any pets in the home. Do not handle pets or other animals while sick.  For more information, see COVID-19 and Animals.  • Make sure that shared spaces in the home have good air flow, such as by an air conditioner or an opened window, weather permitting.  • Perform hand hygiene frequently. Wash your hands often with soap and water for at least 20 seconds or use an alcohol-based hand  that contains 60 to 95% alcohol, covering all surfaces of your hands and rubbing them together until they feel dry. Soap and water should be used preferentially if hands are visibly dirty.  • Avoid touching your eyes, nose, and mouth with  unwashed hands.  • The patient should wear a facemask when you are around other people. If the patient is not able to wear a facemask (for example, because it causes trouble breathing), you, as the caregiver, should wear a mask when you are in the same room as the patient.  • Wear a disposable facemask and gloves when you touch or have contact with the patient’s blood, stool, or body fluids, such as saliva, sputum, nasal mucus, vomit, or urine.   o Throw out disposable facemasks and gloves after using them. Do not reuse.  o When removing personal protective equipment, first remove and dispose of gloves. Then, immediately clean your hands with soap and water or alcohol-based hand . Next, remove and dispose of facemask, and immediately clean your hands again with soap and water or alcohol-based hand .  • Avoid sharing household items with the patient. You should not share dishes, drinking glasses, cups, eating utensils, towels, bedding, or other items. After the patient uses these items, you should wash them thoroughly (see below “Wash laundry thoroughly”).  • Clean all “high-touch” surfaces, such as counters, tabletops, doorknobs, bathroom fixtures, toilets, phones, keyboards, tablets, and bedside tables, every day. Also, clean any surfaces that may have blood, stool, or body fluids on them.   o Use a household cleaning spray or wipe, according to the label instructions. Labels contain instructions for safe and effective use of the cleaning product including precautions you should take when applying the product, such as wearing gloves and making sure you have good ventilation during use of the product.  • Wash laundry thoroughly.   o Immediately remove and wash clothes or bedding that have blood, stool, or body fluids on them.  o Wear disposable gloves while handling soiled items and keep soiled items away from your body. Clean your hands (with soap and water or an alcohol-based hand )  immediately after removing your gloves.  o Read and follow directions on labels of laundry or clothing items and detergent. In general, using a normal laundry detergent according to washing machine instructions and dry thoroughly using the warmest temperatures recommended on the clothing label.  • Place all used disposable gloves, facemasks, and other contaminated items in a lined container before disposing of them with other household waste. Clean your hands (with soap and water or an alcohol-based hand ) immediately after handling these items. Soap and water should be used preferentially if hands are visibly dirty.  • Discuss any additional questions with your state or local health department or healthcare provider.    Adapted from information provided by the Centers for Disease Control and Prevention.  For more information, visit https://www.cdc.gov/coronavirus/2019-ncov/hcp/guidance-prevent-spread.html      Tylenol or Advil as needed for pain, fever, muscle aches  Plenty of fluids  Hand washing discussed  Off work or school note given if needed.  Warm tea for throat.  Pros and cons of antibiotic use discussed.  Instructed to notify us if symptoms worsen or do not improve.      Ana Mar, APRN  Family Practice  [facility] [place]

## 2021-12-22 ENCOUNTER — TRANSCRIBE ORDERS (OUTPATIENT)
Dept: ADMINISTRATIVE | Facility: HOSPITAL | Age: 66
End: 2021-12-22

## 2021-12-22 ENCOUNTER — HOSPITAL ENCOUNTER (OUTPATIENT)
Dept: INFUSION THERAPY | Facility: HOSPITAL | Age: 66
Discharge: HOME | End: 2021-12-22
Admitting: NURSE PRACTITIONER

## 2021-12-22 VITALS
RESPIRATION RATE: 18 BRPM | SYSTOLIC BLOOD PRESSURE: 137 MMHG | DIASTOLIC BLOOD PRESSURE: 80 MMHG | OXYGEN SATURATION: 93 % | TEMPERATURE: 97.6 F | HEART RATE: 75 BPM

## 2021-12-22 DIAGNOSIS — U07.1 CLINICAL DIAGNOSIS OF SEVERE ACUTE RESPIRATORY SYNDROME CORONAVIRUS 2 (SARS-COV-2) DISEASE: Primary | ICD-10-CM

## 2021-12-22 PROCEDURE — M0243 CASIRIVI AND IMDEVI INFUSION: HCPCS | Performed by: NURSE PRACTITIONER

## 2021-12-22 PROCEDURE — 25010000002 INJECTION, CASIRIVIMAB AND IMDEVIMAB, 1200 MG: Performed by: NURSE PRACTITIONER

## 2021-12-22 RX ORDER — EPINEPHRINE 1 MG/ML
0.3 INJECTION, SOLUTION, CONCENTRATE INTRAVENOUS AS NEEDED
Status: DISCONTINUED | OUTPATIENT
Start: 2021-12-22 | End: 2021-12-24 | Stop reason: HOSPADM

## 2021-12-22 RX ORDER — DIPHENHYDRAMINE HYDROCHLORIDE 50 MG/ML
50 INJECTION INTRAMUSCULAR; INTRAVENOUS ONCE AS NEEDED
Status: DISCONTINUED | OUTPATIENT
Start: 2021-12-22 | End: 2021-12-24 | Stop reason: HOSPADM

## 2021-12-22 RX ORDER — DIPHENHYDRAMINE HCL 50 MG
50 CAPSULE ORAL ONCE AS NEEDED
Status: DISCONTINUED | OUTPATIENT
Start: 2021-12-22 | End: 2021-12-24 | Stop reason: HOSPADM

## 2021-12-22 RX ADMIN — IMDEVIMAB: 300 INJECTION, SOLUTION, CONCENTRATE INTRAVENOUS at 10:42

## 2021-12-22 NOTE — NURSING NOTE
"Pt arrived to [facility] for SQ regeneron per MD order. Pt given handout titled, \"Fact Sheet for Patients, Parents and Caregivers: Emergency Use Authorization of Regen-cov\" prior to administration of SQ regeneron. RN educated pt on injection process, to not rcv any covid vaccine for 90 days, to go to ER for any issues with worsening breathing and to call PCP if symptoms are not improving. Pt vu. Pt denies any questions at this time.     MD order located media    Regeneron given x4 injections consecutively, each at a different injection sites. See MAR for additional administration information. Pt tolerated each injection without any issues. Pt instructed to remain in room for 1 hour for post monitoring period. Call light within reach.    1150- Post monitoring complete. VS obtained and documented. No change in pt assessment. AVS given to pt. Pt escorted to private entrance and discharged in stable condition.     "

## 2021-12-24 ENCOUNTER — HOSPITAL ENCOUNTER (EMERGENCY)
Facility: HOSPITAL | Age: 66
Discharge: HOME | End: 2021-12-24
Attending: EMERGENCY MEDICINE | Admitting: EMERGENCY MEDICINE

## 2021-12-24 VITALS
BODY MASS INDEX: 31.67 KG/M2 | RESPIRATION RATE: 18 BRPM | WEIGHT: 172.1 LBS | HEIGHT: 62 IN | SYSTOLIC BLOOD PRESSURE: 155 MMHG | HEART RATE: 74 BPM | TEMPERATURE: 98.1 F | OXYGEN SATURATION: 91 % | DIASTOLIC BLOOD PRESSURE: 83 MMHG

## 2021-12-24 DIAGNOSIS — U07.1 COVID: Primary | ICD-10-CM

## 2021-12-24 DIAGNOSIS — R11.2 NON-INTRACTABLE VOMITING WITH NAUSEA, UNSPECIFIED VOMITING TYPE: ICD-10-CM

## 2021-12-24 LAB
BACTERIA UR QL AUTO: ABNORMAL /HPF
BILIRUB UR QL STRIP: NEGATIVE
CLARITY UR: CLEAR
COLOR UR: YELLOW
GLUCOSE UR STRIP-MCNC: NEGATIVE MG/DL
HGB UR QL STRIP.AUTO: NEGATIVE
HYALINE CASTS UR QL AUTO: ABNORMAL /LPF
KETONES UR QL STRIP: NEGATIVE
LEUKOCYTE ESTERASE UR QL STRIP.AUTO: NEGATIVE
NITRITE UR QL STRIP: NEGATIVE
PH UR STRIP.AUTO: 6 [PH] (ref 4.5–8)
PROT UR QL STRIP: ABNORMAL
RBC # UR STRIP: ABNORMAL /HPF
REF LAB TEST METHOD: ABNORMAL
SP GR UR STRIP: 1.01 (ref 1–1.03)
SQUAMOUS #/AREA URNS HPF: ABNORMAL /HPF
UROBILINOGEN UR QL STRIP: ABNORMAL
WBC # UR STRIP: ABNORMAL /HPF

## 2021-12-24 PROCEDURE — 25010000002 METOCLOPRAMIDE PER 10 MG: Performed by: EMERGENCY MEDICINE

## 2021-12-24 PROCEDURE — 25010000002 ONDANSETRON PER 1 MG: Performed by: EMERGENCY MEDICINE

## 2021-12-24 PROCEDURE — 96375 TX/PRO/DX INJ NEW DRUG ADDON: CPT

## 2021-12-24 PROCEDURE — 96374 THER/PROPH/DIAG INJ IV PUSH: CPT

## 2021-12-24 PROCEDURE — 81001 URINALYSIS AUTO W/SCOPE: CPT | Performed by: EMERGENCY MEDICINE

## 2021-12-24 PROCEDURE — 25010000002 DIPHENHYDRAMINE PER 50 MG: Performed by: EMERGENCY MEDICINE

## 2021-12-24 PROCEDURE — 99282 EMERGENCY DEPT VISIT SF MDM: CPT | Performed by: EMERGENCY MEDICINE

## 2021-12-24 PROCEDURE — 99283 EMERGENCY DEPT VISIT LOW MDM: CPT

## 2021-12-24 RX ORDER — METOCLOPRAMIDE HYDROCHLORIDE 5 MG/ML
10 INJECTION INTRAMUSCULAR; INTRAVENOUS ONCE
Status: COMPLETED | OUTPATIENT
Start: 2021-12-24 | End: 2021-12-24

## 2021-12-24 RX ORDER — ONDANSETRON 4 MG/1
4 TABLET, ORALLY DISINTEGRATING ORAL 4 TIMES DAILY PRN
Qty: 10 TABLET | Refills: 0 | Status: SHIPPED | OUTPATIENT
Start: 2021-12-24 | End: 2022-08-17

## 2021-12-24 RX ORDER — ONDANSETRON 2 MG/ML
4 INJECTION INTRAMUSCULAR; INTRAVENOUS ONCE
Status: COMPLETED | OUTPATIENT
Start: 2021-12-24 | End: 2021-12-24

## 2021-12-24 RX ORDER — SODIUM CHLORIDE 0.9 % (FLUSH) 0.9 %
10 SYRINGE (ML) INJECTION AS NEEDED
Status: DISCONTINUED | OUTPATIENT
Start: 2021-12-24 | End: 2021-12-24 | Stop reason: HOSPADM

## 2021-12-24 RX ORDER — DIPHENHYDRAMINE HYDROCHLORIDE 50 MG/ML
25 INJECTION INTRAMUSCULAR; INTRAVENOUS ONCE
Status: COMPLETED | OUTPATIENT
Start: 2021-12-24 | End: 2021-12-24

## 2021-12-24 RX ADMIN — SODIUM CHLORIDE 1000 ML: 9 INJECTION, SOLUTION INTRAVENOUS at 17:32

## 2021-12-24 RX ADMIN — METOCLOPRAMIDE 10 MG: 5 INJECTION, SOLUTION INTRAMUSCULAR; INTRAVENOUS at 17:21

## 2021-12-24 RX ADMIN — DIPHENHYDRAMINE HYDROCHLORIDE 25 MG: 50 INJECTION, SOLUTION INTRAMUSCULAR; INTRAVENOUS at 18:15

## 2021-12-24 RX ADMIN — ONDANSETRON 4 MG: 2 INJECTION INTRAMUSCULAR; INTRAVENOUS at 17:22

## 2021-12-24 NOTE — ED PROVIDER NOTES
Subjective   History of Present Illness  66-year-old female presents to the emergency room complaining of general malaise and nausea and vomiting.  She started having general malaise and nausea 1 week ago and she was diagnosed with Covid.  She says that she is at nausea and vomiting since then.  She says that last night her daughter gave her Phenergan and she was able to drink lots of water and she took a half Phenergan today and also drank water.  She presents today because she just feels tired.  No chest pain or shortness of breath no abdominal pain denies dysuria.  Review of Systems   Constitutional: Positive for chills and fatigue. Negative for fever.   Respiratory: Negative for cough, chest tightness and shortness of breath.    Gastrointestinal: Positive for nausea and vomiting. Negative for abdominal pain.   Genitourinary: Negative for difficulty urinating, dysuria and frequency.   Neurological: Positive for weakness. Negative for dizziness and syncope.       Past Medical History:   Diagnosis Date   • Abdominal wall mass 2014   • Abnormal mammogram 2018   • Abnormal mammogram 2015   • Anxiety    • Colon polyps    • Colon stricture (HCC) 2019   • Foot deformity    • GERD (gastroesophageal reflux disease)    • Left breast mass 2015   • OA (osteoarthritis)    • Obesity    • Post-menopausal    • Vitamin B 12 deficiency        Allergies   Allergen Reactions   • Dextromethorphan Unknown - Low Severity   • Elavil [Amitriptyline Hcl] Dizziness   • Norvasc [Amlodipine Besylate] Swelling     Ankles swelling       Past Surgical History:   Procedure Laterality Date   • BILATERAL BREAST REDUCTION Bilateral    • CARDIAC CATHETERIZATION Right 2014    MILD NONOBSTRUCTIVE CAD, NORMAL LEFT VENTRICULAR SYSTOLIC FUNCTION, LEFT VENTRICULAR HYPERTROPHY, DR. LESTER HECK AT [facility]   •  SECTION N/A 1976   •  SECTION N/A 1979   • CHOLECYSTECTOMY N/A 2014    LAPAROSCOPIC  "WITH CHOLANGIOGRAM, DR. TRAY NARVAEZ AT [facility]   • COLONOSCOPY N/A 2003    GOOD PREP, NONSPECIFIC ILEITIS, SIGMOID DIVERTICULOSIS, DR. AURY SOTELO AT [facility]   • COLONOSCOPY N/A 2019    SIGMOID STRICTURE, DIVERTICULOSIS, PELVIC ADHESIONS, DR. AURY SOTELO AT [facility]   • COLONOSCOPY W/ POLYPECTOMY N/A 2003    LARGE INTERNAL HEMORRHOIDS, 3 MM HYPERPLASTIC POLYP IN RECTUM, SCATTERED DIVERTICULOSIS, DR. ADOLFO ISLAS AT [facility]    • ERCP N/A 01/10/2014    CHOLEDOCHOLITHIASIS WAS FOUND, COMPLETE REMOVAL BY BILIARY SPHINCTEROTOMY AND BALLOON EXTRACTION, DR. MARGA MARAVILLA AT [facility]   • ERCP N/A 2014    PRIOR BILIARY SPHINCTEROTOMY APPEARED STENOSED, DR. MARGA MARAVILLA AT [facility]   • ERCP WITH FOREIGN BODY REMOVAL N/A 2014    PRIOR BILIARY SPHINCTEROTOMY APPEARD OPEN, 1 VISIBLY OCCLUDED STENT FROM THE BILIARY TREE WAS IN THE MAJOR PAPILLA, STENT REMOVED FROM BILIARY TREE, DR. MARGA MARAVILLA AT [facility]   • MOUTH SURGERY N/A 2018   • REDUCTION MAMMAPLASTY         Family History   Problem Relation Age of Onset   • COPD Mother    • Emphysema Mother    • Heart disease Mother    • Heart disease Father    • Kidney disease Father    • Vision loss Father    • Cancer Father    • Diabetes Father    • Lymphoma Father    • Stroke Brother    • Hypertension Brother    • Coronary artery disease Brother         stent   • Peripheral vascular disease Brother         stent in the carotid   • Diabetes Brother    • No Known Problems Daughter    • Drug abuse Son    • Breast cancer Maternal Aunt        Social History     Socioeconomic History   • Marital status:      Spouse name: Venu\"Miguel\"   • Number of children: 2   • Years of education: College degree   Tobacco Use   • Smoking status: Former Smoker     Years: 11.00     Types: Cigarettes     Quit date:      Years since quittin.9   • Smokeless tobacco: Never Used   Vaping Use   • Vaping Use: Never used   Substance and Sexual Activity   • " Alcohol use: Yes     Comment: 0-1 per month   • Drug use: No   • Sexual activity: Yes     Partners: Male     Birth control/protection: Post-menopausal           Objective    ED Triage Vitals [12/24/21 1700]   Temp Heart Rate Resp BP SpO2   98.1 °F (36.7 °C) 74 18 155/83 91 %      Temp src Heart Rate Source Patient Position BP Location FiO2 (%)   Oral Monitor Lying Right arm --       Physical Exam  INITIAL VITAL SIGNS: Reviewed by me.  Pulse ox normal  GENERAL: Alert and interactive. No acute distress.  HEAD: Head is normocephalic.  EYES: EOMI. PERRL. No scleral icterus. No conjunctival injection.  ENT: Moist mucous membranes.   NECK: Supple. Full range of motion.  RESPIRATORY: No tachypnea. Clear breath sounds bilaterally. No wheezing. No rales. No rhonchi.  CV: Regular rate and rhythm. No murmurs. No rubs or gallops.  ABDOMEN: Soft, nondistended, nontender. No guarding. No rebound. No masses.   SKIN: Warm and dry. No diaphoresis. No obvious rashes.   NEUROLOGIC: Alert and oriented. Face is symmetric. Speech is normal.     Procedures           ED Course  ED Course as of 12/24/21 1813   Fri Dec 24, 2021   1740 66-year-old female who appears to feel ill but is in no distress and interacts without difficulty complains of nausea and vomiting secondary to Covid.  Denies any abdominal pain.  On exam her triage oxygen was 91% but in my history and physical it was 93-94%.  She has normal heart and lung sounds soft nontender abdomen.  Will check a urine give her some fluids and antiemetics.  She already is tolerating p.o. after taking some Phenergan at home. [RO]   1811 No evidence of UTI.  Her nausea is controlled but she is slightly akathetic after Reglan, will give her some Benadryl and discharged home.  Discussed with her that only pharmacy and it was open as an level she says is fine if she needs it she has family can drive down there. [RO]      ED Course User Index  [RO] Noel Plummer MD                                                  MDM    Final diagnoses:   COVID   Non-intractable vomiting with nausea, unspecified vomiting type       ED Disposition  ED Disposition     ED Disposition Condition Comment    Discharge Good           John Parker MD  [address] [facility] [address] [phone]    Call   To schedule follow-up appointment         Medication List      New Prescriptions    ondansetron ODT 4 MG disintegrating tablet  Commonly known as: ZOFRAN-ODT  Place 1 tablet on the tongue 4 (Four) Times a Day As Needed for Nausea or Vomiting.           Where to Get Your Medications      These medications were sent to [facility]/pharmacy #[id number] - [place], [place] - [address] [place] - [phone] [facility] [phone]   [address], [place] [id number]    Hours: 24-hours Phone: [phone]   · ondansetron ODT 4 MG disintegrating tablet          Noel Plummer MD  12/24/21 4903

## 2021-12-24 NOTE — DISCHARGE INSTRUCTIONS
Take medications as prescribed.  Drink small amounts of fluid, if you are tolerating them you may drink some more frequently.  Return to the emergency room if you have uncontrolled vomiting despite medications.

## 2021-12-29 DIAGNOSIS — F41.9 ANXIETY AND DEPRESSION: ICD-10-CM

## 2021-12-29 DIAGNOSIS — F32.A ANXIETY AND DEPRESSION: ICD-10-CM

## 2021-12-30 RX ORDER — DIAZEPAM 5 MG/1
TABLET ORAL
Qty: 20 TABLET | Refills: 0 | Status: SHIPPED | OUTPATIENT
Start: 2021-12-30 | End: 2022-08-17

## 2022-01-14 DIAGNOSIS — M19.90 ARTHRITIS: ICD-10-CM

## 2022-01-14 RX ORDER — IBUPROFEN 800 MG/1
800 TABLET ORAL EVERY 8 HOURS PRN
Qty: 90 TABLET | Refills: 1 | Status: SHIPPED | OUTPATIENT
Start: 2022-01-14 | End: 2022-11-01 | Stop reason: SDUPTHER

## 2022-03-11 DIAGNOSIS — E78.2 MIXED HYPERLIPIDEMIA: ICD-10-CM

## 2022-03-11 DIAGNOSIS — I10 ESSENTIAL HYPERTENSION: ICD-10-CM

## 2022-03-12 RX ORDER — HYDROCHLOROTHIAZIDE 25 MG/1
25 TABLET ORAL DAILY
Qty: 90 TABLET | Refills: 0 | Status: SHIPPED | OUTPATIENT
Start: 2022-03-12 | End: 2022-08-17 | Stop reason: SDUPTHER

## 2022-03-12 RX ORDER — EZETIMIBE 10 MG/1
10 TABLET ORAL DAILY
Qty: 90 TABLET | Refills: 0 | Status: SHIPPED | OUTPATIENT
Start: 2022-03-12 | End: 2022-08-17

## 2022-03-12 RX ORDER — ROSUVASTATIN CALCIUM 5 MG/1
5 TABLET, COATED ORAL DAILY
Qty: 90 TABLET | Refills: 0 | Status: SHIPPED | OUTPATIENT
Start: 2022-03-12 | End: 2022-06-07

## 2022-06-07 DIAGNOSIS — E78.2 MIXED HYPERLIPIDEMIA: ICD-10-CM

## 2022-06-07 RX ORDER — ROSUVASTATIN CALCIUM 5 MG/1
TABLET, COATED ORAL
Qty: 90 TABLET | Refills: 1 | Status: SHIPPED | OUTPATIENT
Start: 2022-06-07 | End: 2022-08-17 | Stop reason: SDUPTHER

## 2022-08-08 ENCOUNTER — TELEPHONE (OUTPATIENT)
Dept: FAMILY MEDICINE CLINIC | Facility: CLINIC | Age: 67
End: 2022-08-08

## 2022-08-08 DIAGNOSIS — U07.1 COVID-19 VIRUS DETECTED: Primary | ICD-10-CM

## 2022-08-08 NOTE — TELEPHONE ENCOUNTER
5 day home quarantine.  OTC meds for symptom control    Ask how severe symptoms are.    John Parker MD

## 2022-08-08 NOTE — TELEPHONE ENCOUNTER
Caller: Nery Jarrell    Relationship: Self    Best call back number:    What is the best time to reach you:     Who are you requesting to speak with (clinical staff, provider,  specific staff member): DR DO OR STAFF    Do you know the name of the person who called:     What was the call regarding: PATIENT IS CALLING IN STATING THAT SHE HAS TESTED FOR COVID ON 08/06/22 AND SHE WANTS TO BE CALLED BACK WITH INSTRUCTIONS ON WHAT SHE NEEDS TO BE DOING.  SHE HAS BEEN EXPERIENCING SORE THROAT AND A COUGH.     Do you require a callback: YES

## 2022-08-08 NOTE — TELEPHONE ENCOUNTER
Pt states her throat is very sore, feels swollen and coughing at night that is keeping her up. She has been taking mucinex every day but no improvement. She asked if there is any antibiotic that can called be to help, please advise.     Ok, I will send in Paxlovid in for her

## 2022-08-17 ENCOUNTER — OFFICE VISIT (OUTPATIENT)
Dept: FAMILY MEDICINE CLINIC | Facility: CLINIC | Age: 67
End: 2022-08-17

## 2022-08-17 VITALS
OXYGEN SATURATION: 97 % | TEMPERATURE: 98.4 F | WEIGHT: 163 LBS | SYSTOLIC BLOOD PRESSURE: 150 MMHG | BODY MASS INDEX: 30 KG/M2 | HEART RATE: 65 BPM | HEIGHT: 62 IN | DIASTOLIC BLOOD PRESSURE: 80 MMHG

## 2022-08-17 DIAGNOSIS — I10 ESSENTIAL HYPERTENSION: ICD-10-CM

## 2022-08-17 DIAGNOSIS — E78.2 MIXED HYPERLIPIDEMIA: ICD-10-CM

## 2022-08-17 PROBLEM — Z00.00 ROUTINE ADULT HEALTH MAINTENANCE: Status: RESOLVED | Noted: 2018-05-23 | Resolved: 2022-08-17

## 2022-08-17 PROCEDURE — 99213 OFFICE O/P EST LOW 20 MIN: CPT | Performed by: FAMILY MEDICINE

## 2022-08-17 RX ORDER — HYDROCHLOROTHIAZIDE 25 MG/1
25 TABLET ORAL DAILY
Qty: 90 TABLET | Refills: 3 | Status: SHIPPED | OUTPATIENT
Start: 2022-08-17 | End: 2022-09-11

## 2022-08-17 RX ORDER — ROSUVASTATIN CALCIUM 5 MG/1
5 TABLET, COATED ORAL DAILY
Qty: 90 TABLET | Refills: 3 | Status: SHIPPED | OUTPATIENT
Start: 2022-08-17 | End: 2023-02-20 | Stop reason: SDUPTHER

## 2022-08-17 RX ORDER — VALSARTAN 320 MG/1
320 TABLET ORAL DAILY
Qty: 90 TABLET | Refills: 3 | Status: SHIPPED | OUTPATIENT
Start: 2022-08-17 | End: 2022-09-24

## 2022-08-17 NOTE — PROGRESS NOTES
"  Chief Complaint   Patient presents with   • Hypertension   • Hyperlipidemia       Subjective     Patient here for follow-up of elevated blood pressure.    She is not exercising and is not adherent to a low-salt diet.    Blood pressure is well controlled at home.   Cardiac symptoms: none.   Patient denies: chest pressure/discomfort.   Cardiovascular risk factors: advanced age (older than 55 for men, 65 for women), dyslipidemia, family history of premature cardiovascular disease, hypertension, male gender, obesity (BMI >= 30 kg/m2) and sedentary lifestyle.   Use of agents associated with hypertension: none.   History of target organ damage: none.  Patient is taking prescribed hypertension medications as prescribed without side effects.    The following portions of the patient's history were reviewed and updated as appropriate: allergies, current medications, past family history, past medical history, past social history, past surgical history and problem list.    Review of Systems  Pertinent items are noted in HPI.         Vitals:    08/17/22 1413   BP: 150/80   BP Location: Left arm   Patient Position: Sitting   Cuff Size: Adult   Pulse: 65   Temp: 98.4 °F (36.9 °C)   SpO2: 97%   Weight: 73.9 kg (163 lb)   Height: 157.5 cm (62\")     BP Readings from Last 3 Encounters:   08/17/22 150/80   12/24/21 155/83   12/22/21 137/80     Objective      Gen: alert, pleasant.  Neck: no bruit, no enlarged thyroid  Lungs: CTA  Heart: RR, no murmur  Feet: no edema  Pulses: intact       Assessment & Plan   Hypertension, normal blood pressure Evidence of target organ damage: none.    Diagnoses and all orders for this visit:    1. Mixed hyperlipidemia  -     rosuvastatin (CRESTOR) 5 MG tablet; Take 1 tablet by mouth Daily. Indications: Elevation of Both Cholesterol and Triglycerides in Blood  Dispense: 90 tablet; Refill: 3    2. Essential hypertension  -     hydroCHLOROthiazide (HYDRODIURIL) 25 MG tablet; Take 1 tablet by mouth Daily. " Indications: Edema  Dispense: 90 tablet; Refill: 3  -     valsartan (DIOVAN) 320 MG tablet; Take 1 tablet by mouth Daily. Indications: High Blood Pressure Disorder  Dispense: 90 tablet; Refill: 3      Medication: no change.  Follow up: 6 months and as needed.    There are no Patient Instructions on file for this visit.  Medications Discontinued During This Encounter   Medication Reason   • ondansetron ODT (ZOFRAN-ODT) 4 MG disintegrating tablet *Therapy completed   • promethazine-dextromethorphan (PROMETHAZINE-DM) 6.25-15 MG/5ML syrup *Therapy completed   • ezetimibe (ZETIA) 10 MG tablet Cost of medication   • valsartan (DIOVAN) 320 MG tablet Reorder   • hydroCHLOROthiazide (HYDRODIURIL) 25 MG tablet Reorder   • rosuvastatin (CRESTOR) 5 MG tablet Reorder   • diazePAM (VALIUM) 5 MG tablet *Therapy completed        Return in about 6 months (around 2/17/2023) for blood pressure, Medicare Wellness visit.    Limit salt  Limit alcoholic drinks to 1 a day  Limit caffeine to 1-2 servings a day    Dr. John Parker MD  [place], [place]  [facility].

## 2022-09-10 DIAGNOSIS — I10 ESSENTIAL HYPERTENSION: ICD-10-CM

## 2022-09-11 RX ORDER — HYDROCHLOROTHIAZIDE 25 MG/1
TABLET ORAL
Qty: 90 TABLET | Refills: 1 | Status: SHIPPED | OUTPATIENT
Start: 2022-09-11 | End: 2023-02-20 | Stop reason: SDUPTHER

## 2022-09-24 DIAGNOSIS — I10 ESSENTIAL HYPERTENSION: ICD-10-CM

## 2022-09-24 RX ORDER — VALSARTAN 320 MG/1
TABLET ORAL
Qty: 90 TABLET | Refills: 1 | Status: SHIPPED | OUTPATIENT
Start: 2022-09-24 | End: 2023-02-20 | Stop reason: SDUPTHER

## 2022-09-26 DIAGNOSIS — F41.9 ANXIETY AND DEPRESSION: ICD-10-CM

## 2022-09-26 DIAGNOSIS — F32.A ANXIETY AND DEPRESSION: ICD-10-CM

## 2022-09-26 RX ORDER — DIAZEPAM 5 MG/1
TABLET ORAL
Qty: 20 TABLET | OUTPATIENT
Start: 2022-09-26

## 2022-10-24 ENCOUNTER — TELEPHONE (OUTPATIENT)
Dept: FAMILY MEDICINE CLINIC | Facility: CLINIC | Age: 67
End: 2022-10-24

## 2022-10-24 NOTE — TELEPHONE ENCOUNTER
Caller: WeslyNery A    Relationship to patient: Self    Best call back number: [phone]     Patient is needing:    NERY HAS BEEN HAVING RASH SINCE 5/2022, IT IS ITCHY, SHE NOW HAS SCABS FROM SCRATCHING THE EARLIEST I COULD SCHEDULE IS 11/1/2022 , SHE WOULD LIKE TO KNOW IF SHE SHOULD GO TO URGENT CARE INSTEAD

## 2022-10-24 NOTE — TELEPHONE ENCOUNTER
Pt will try and go to UC today I offered a Wednesday morning appointment with  but she could not get her at that time.

## 2022-11-01 ENCOUNTER — OFFICE VISIT (OUTPATIENT)
Dept: FAMILY MEDICINE CLINIC | Facility: CLINIC | Age: 67
End: 2022-11-01

## 2022-11-01 VITALS
SYSTOLIC BLOOD PRESSURE: 150 MMHG | OXYGEN SATURATION: 97 % | RESPIRATION RATE: 16 BRPM | WEIGHT: 168 LBS | BODY MASS INDEX: 30.91 KG/M2 | DIASTOLIC BLOOD PRESSURE: 82 MMHG | HEIGHT: 62 IN | TEMPERATURE: 98 F | HEART RATE: 74 BPM

## 2022-11-01 DIAGNOSIS — M19.90 ARTHRITIS: ICD-10-CM

## 2022-11-01 DIAGNOSIS — F32.A ANXIETY AND DEPRESSION: ICD-10-CM

## 2022-11-01 DIAGNOSIS — F41.9 ANXIETY AND DEPRESSION: ICD-10-CM

## 2022-11-01 DIAGNOSIS — L30.9 DERMATITIS: Primary | ICD-10-CM

## 2022-11-01 PROCEDURE — 99213 OFFICE O/P EST LOW 20 MIN: CPT | Performed by: NURSE PRACTITIONER

## 2022-11-01 PROCEDURE — 96372 THER/PROPH/DIAG INJ SC/IM: CPT | Performed by: NURSE PRACTITIONER

## 2022-11-01 RX ORDER — IBUPROFEN 800 MG/1
800 TABLET ORAL EVERY 8 HOURS PRN
Qty: 90 TABLET | Refills: 0 | Status: SHIPPED | OUTPATIENT
Start: 2022-11-01 | End: 2023-02-20 | Stop reason: SDUPTHER

## 2022-11-01 RX ORDER — METHYLPREDNISOLONE 4 MG/1
TABLET ORAL
Qty: 21 EACH | Refills: 0 | Status: SHIPPED | OUTPATIENT
Start: 2022-11-02 | End: 2022-12-19

## 2022-11-01 RX ORDER — TRIAMCINOLONE ACETONIDE 40 MG/ML
40 INJECTION, SUSPENSION INTRA-ARTICULAR; INTRAMUSCULAR ONCE
Status: COMPLETED | OUTPATIENT
Start: 2022-11-01 | End: 2022-11-01

## 2022-11-01 RX ORDER — DIAZEPAM 5 MG/1
5 TABLET ORAL NIGHTLY PRN
Qty: 20 TABLET | Refills: 0 | Status: SHIPPED | OUTPATIENT
Start: 2022-11-01 | End: 2023-02-20 | Stop reason: SDUPTHER

## 2022-11-01 RX ADMIN — TRIAMCINOLONE ACETONIDE 40 MG: 40 INJECTION, SUSPENSION INTRA-ARTICULAR; INTRAMUSCULAR at 08:44

## 2022-11-01 NOTE — PROGRESS NOTES
"Subjective      Chief Complaint   Patient presents with   • Rash     Lower legs       Nery Jarrell is a 67 y.o. female who presents for evaluation of a rash involving the lower extremity. Rash started 6 months ago. Initially started on right lower leg, then spread to left lower leg. Lesions are red, and raised in texture. Rash has changed over time. Rash is painful and is pruritic. Associated symptoms: none. Patient denies: abdominal pain and fever. Patient has not had contacts with similar rash. Patient has not had new exposures (soaps, lotions, laundry detergents, foods, medications, plants, insects or animals).  Home remedies:  OTC hydrocortisone cream without relief.  Benadryl cream helped some.    Also requesting refill on Valium for anxiety/sleep and ibuprofen for arthritis pain.  Both control symptoms well.      The following portions of the patient's history were reviewed and updated as appropriate: allergies, current medications, past family history, past medical history, past social history, past surgical history and problem list.      Objective   /82 (BP Location: Left arm, Patient Position: Sitting, Cuff Size: Adult)   Pulse 74   Temp 98 °F (36.7 °C)   Resp 16   Ht 157.5 cm (62\")   Wt 76.2 kg (168 lb)   SpO2 97%   BMI 30.73 kg/m²   General:  alert, appears stated age and cooperative   Skin:  erythema noted on bilateral lower extremities scattered hives.       Assessment & Plan   dermatitis        Diagnosis Plan   1. Dermatitis  triamcinolone acetonide (KENALOG-40) injection 40 mg    methylPREDNISolone (MEDROL) 4 MG dose pack - Start tomorrow.      triamcinolone (KENALOG) 0.1 % ointment      2. Arthritis  ibuprofen (ADVIL,MOTRIN) 800 MG tablet      3. Anxiety and depression  diazePAM (VALIUM) 5 MG tablet          Medications: topical steroid: medrol dose pack and triamcinolone.  Verbal patient instruction given.  Follow up in 2 weeks.prn.      Patient was wearing face mask when I entered the " room and throughout our encounter. Protective equipment was worn throughout this patient encounter including a face mask.  Hand hygiene was performed before donning protective equipment and after removal when leaving the room.     DAT query complete. Treatment plan to include limited course of prescribed  controlled substance. Risks including addiction, benefits, and alternatives presented to patient.     Ana Mar, APRN

## 2023-01-03 ENCOUNTER — TELEPHONE (OUTPATIENT)
Dept: FAMILY MEDICINE CLINIC | Facility: CLINIC | Age: 68
End: 2023-01-03

## 2023-01-03 RX ORDER — BENZONATATE 200 MG/1
200 CAPSULE ORAL 3 TIMES DAILY PRN
Qty: 60 CAPSULE | Refills: 0 | Status: SHIPPED | OUTPATIENT
Start: 2023-01-03 | End: 2023-02-20

## 2023-01-03 NOTE — TELEPHONE ENCOUNTER
Please advise     Okay, we can send in Tessalon Perles in for you for your cough.  It does not contain dextromethorphan, MINDY Parker MD

## 2023-01-03 NOTE — TELEPHONE ENCOUNTER
"Spoke w/ pt spouse he said the tessalon perles do not help, her cough is a \"wet\" cough. She was prescribed a codeine cough syrup from the  but the pharmacy it went to was unable to fill it. He is unable to find anything otc w/o DM.   "

## 2023-01-03 NOTE — TELEPHONE ENCOUNTER
Caller: Miguel Jarrell    Relationship: Emergency Contact    Best call back number: [phone]    What medication are you requesting: COUGH MEDICINE, PATIENT CANNOT TAKE DM MEDICINE     What are your current symptoms: DRY COUGH, FATIGUE, NO APPETITE     How long have you been experiencing symptoms: 3 WEEKS    Have you had these symptoms before:    [x] Yes  [] No    Have you been treated for these symptoms before:   [x] Yes  [] No    If a prescription is needed, what is your preferred pharmacy and phone number: [facility] #[id number] - [facility] [address] [place] [address] AT [facility] [address] - [phone]  - [phone]      Additional notes:    PATIENT'S  CALLED AND SAID THE PATIENT HAS HAD AN UPPER RESPIRATORY ISSUE FOR ABOUT THREE WEEKS NOW AND HAS NOT BEEN ABLE TO GET RID OF HER COUGH AND FATIGUE, HE IS WANTING TO SEE IF SHE CAN GET MEDICATION SENT IN FOR THE COUGH    HE SAID SHE CANNOT TAKE ANYTHING WITH DM IN IT

## 2023-02-08 DIAGNOSIS — E55.9 AVITAMINOSIS D: ICD-10-CM

## 2023-02-08 DIAGNOSIS — Z79.899 HIGH RISK MEDICATION USE: ICD-10-CM

## 2023-02-08 DIAGNOSIS — Z79.899 ENCOUNTER FOR MONITORING STATIN THERAPY: ICD-10-CM

## 2023-02-08 DIAGNOSIS — E78.2 MIXED HYPERLIPIDEMIA: Primary | ICD-10-CM

## 2023-02-08 DIAGNOSIS — Z51.81 ENCOUNTER FOR MONITORING STATIN THERAPY: ICD-10-CM

## 2023-02-08 DIAGNOSIS — I10 ESSENTIAL HYPERTENSION: ICD-10-CM

## 2023-02-20 ENCOUNTER — OFFICE VISIT (OUTPATIENT)
Dept: FAMILY MEDICINE CLINIC | Facility: CLINIC | Age: 68
End: 2023-02-20
Payer: MEDICARE

## 2023-02-20 VITALS
TEMPERATURE: 98 F | DIASTOLIC BLOOD PRESSURE: 84 MMHG | OXYGEN SATURATION: 98 % | HEART RATE: 70 BPM | HEIGHT: 62 IN | BODY MASS INDEX: 32.57 KG/M2 | SYSTOLIC BLOOD PRESSURE: 134 MMHG | WEIGHT: 177 LBS

## 2023-02-20 DIAGNOSIS — M19.90 ARTHRITIS: ICD-10-CM

## 2023-02-20 DIAGNOSIS — E78.2 MIXED HYPERLIPIDEMIA: ICD-10-CM

## 2023-02-20 DIAGNOSIS — I10 ESSENTIAL HYPERTENSION: ICD-10-CM

## 2023-02-20 DIAGNOSIS — Z12.31 ENCOUNTER FOR SCREENING MAMMOGRAM FOR MALIGNANT NEOPLASM OF BREAST: ICD-10-CM

## 2023-02-20 DIAGNOSIS — F32.A ANXIETY AND DEPRESSION: ICD-10-CM

## 2023-02-20 DIAGNOSIS — F41.9 ANXIETY AND DEPRESSION: ICD-10-CM

## 2023-02-20 DIAGNOSIS — E78.2 MODERATE MIXED HYPERLIPIDEMIA NOT REQUIRING STATIN THERAPY: ICD-10-CM

## 2023-02-20 DIAGNOSIS — L30.9 DERMATITIS: ICD-10-CM

## 2023-02-20 DIAGNOSIS — Z00.00 MEDICARE ANNUAL WELLNESS VISIT, SUBSEQUENT: Primary | ICD-10-CM

## 2023-02-20 PROCEDURE — G0439 PPPS, SUBSEQ VISIT: HCPCS | Performed by: FAMILY MEDICINE

## 2023-02-20 PROCEDURE — 1159F MED LIST DOCD IN RCRD: CPT | Performed by: FAMILY MEDICINE

## 2023-02-20 PROCEDURE — 1170F FXNL STATUS ASSESSED: CPT | Performed by: FAMILY MEDICINE

## 2023-02-20 RX ORDER — VALSARTAN 320 MG/1
320 TABLET ORAL DAILY
Qty: 90 TABLET | Refills: 1 | Status: SHIPPED | OUTPATIENT
Start: 2023-02-20

## 2023-02-20 RX ORDER — DIAZEPAM 5 MG/1
5 TABLET ORAL NIGHTLY PRN
Qty: 20 TABLET | Refills: 0 | Status: SHIPPED | OUTPATIENT
Start: 2023-02-20 | End: 2023-03-29 | Stop reason: SDUPTHER

## 2023-02-20 RX ORDER — HYDROCHLOROTHIAZIDE 25 MG/1
25 TABLET ORAL DAILY
Qty: 90 TABLET | Refills: 3 | Status: SHIPPED | OUTPATIENT
Start: 2023-02-20

## 2023-02-20 RX ORDER — ROSUVASTATIN CALCIUM 10 MG/1
10 TABLET, COATED ORAL DAILY
Qty: 90 TABLET | Refills: 3 | Status: SHIPPED | OUTPATIENT
Start: 2023-02-20

## 2023-02-20 RX ORDER — IBUPROFEN 800 MG/1
800 TABLET ORAL EVERY 8 HOURS PRN
Qty: 90 TABLET | Refills: 0 | Status: SHIPPED | OUTPATIENT
Start: 2023-02-20

## 2023-02-20 NOTE — PROGRESS NOTES
The ABCs of the Annual Wellness Visit  Subsequent Medicare Wellness Visit    Subjective      Nery Jarrell is a 68 y.o. female who presents for a Subsequent Medicare Wellness Visit.    The following portions of the patient's history were reviewed and   updated as appropriate: allergies, current medications, past family history, past medical history, past social history, past surgical history and problem list.    Compared to one year ago, the patient feels her physical   health is the same.    Compared to one year ago, the patient feels her mental   health is the same.    Recent Hospitalizations:  She was not admitted to the hospital during the last year.       Current Medical Providers:  Patient Care Team:  John Parker MD as PCP - General  [facility], Luz Haque MD as Consulting Physician (Obstetrics and Gynecology)  Iggy Serna MD as Consulting Physician (Gastroenterology)  Abimael Gutiérrez MD as Consulting Physician (Gastroenterology)  Bacilio Hartley MD as Consulting Physician (Cardiology)    Outpatient Medications Prior to Visit   Medication Sig Dispense Refill   • magnesium gluconate (MAGONATE) 500 MG tablet Take 27 mg by mouth 2 (Two) Times a Day. Pt taking once a day     • Vitamin D, Cholecalciferol, 1000 units capsule Take 5,000 Units by mouth.     • diazePAM (VALIUM) 5 MG tablet Take 1 tablet by mouth At Night As Needed for Anxiety or Sleep. 20 tablet 0   • hydroCHLOROthiazide (HYDRODIURIL) 25 MG tablet TAKE 1 TABLET BY MOUTH EVERY DAY 90 tablet 1   • ibuprofen (ADVIL,MOTRIN) 800 MG tablet Take 1 tablet by mouth Every 8 (Eight) Hours As Needed for Mild Pain. 90 tablet 0   • rosuvastatin (CRESTOR) 5 MG tablet Take 1 tablet by mouth Daily. Indications: Elevation of Both Cholesterol and Triglycerides in Blood 90 tablet 3   • triamcinolone (KENALOG) 0.1 % ointment Apply 1 application topically to the appropriate area as directed 2 (Two) Times a Day. 30 g 0   • valsartan (DIOVAN) 320 MG  "tablet TAKE 1 TABLET BY MOUTH EVERY DAY 90 tablet 1   • benzonatate (TESSALON) 200 MG capsule Take 1 capsule by mouth 3 (Three) Times a Day As Needed for Cough. 60 capsule 0   • methylPREDNISolone (MEDROL) 4 MG dose pack Take as directed on package instructions. START TOMORROW 12/20/2022 21 tablet 0   • potassium chloride (K-DUR,KLOR-CON) 20 MEQ CR tablet Take 1 tablet by mouth Daily. 5 tablet 0     No facility-administered medications prior to visit.       No opioid medication identified on active medication list. I have reviewed chart for other potential  high risk medication/s and harmful drug interactions in the elderly.          Aspirin is not on active medication list.  Aspirin use is not indicated based on review of current medical condition/s. Risk of harm outweighs potential benefits.  .    Patient Active Problem List   Diagnosis   • Anxiety and depression   • Mixed hyperlipidemia   • Essential hypertension   • Class 1 obesity due to excess calories with serious comorbidity and body mass index (BMI) of 31.0 to 31.9 in adult   • B12 deficiency   • Avitaminosis D   • Arthritis   • Medicare annual wellness visit, subsequent   • Screen for colon cancer   • Encounter for screening mammogram for malignant neoplasm of breast   • Menopause   • Dermatitis     Advance Care Planning  Advance Directive is not on file.  ACP discussion was held with the patient during this visit. Patient has an advance directive (not in EMR), copy requested.     Objective    Vitals:    02/20/23 1356   BP: 134/84   Pulse: 70   Temp: 98 °F (36.7 °C)   TempSrc: Temporal   SpO2: 98%   Weight: 80.3 kg (177 lb)   Height: 157.5 cm (62\")     Estimated body mass index is 32.37 kg/m² as calculated from the following:    Height as of this encounter: 157.5 cm (62\").    Weight as of this encounter: 80.3 kg (177 lb).    BMI is >= 30 and <35. (Class 1 Obesity). The following options were offered after discussion;: nutrition " counseling/recommendations      Does the patient have evidence of cognitive impairment?   No    Lab Results   Component Value Date    CHLPL 246 (H) 2023    TRIG 96 2023    HDL 79 2023     (H) 2023    VLDL 16 2023          HEALTH RISK ASSESSMENT    Smoking Status:  Social History     Tobacco Use   Smoking Status Former   • Packs/day: 1.00   • Years: 11.00   • Pack years: 11.00   • Types: Cigarettes   • Quit date:    • Years since quittin.1   Smokeless Tobacco Never     Alcohol Consumption:  Social History     Substance and Sexual Activity   Alcohol Use Yes    Comment: 0-1 per month     Fall Risk Screen:    STONE Fall Risk Assessment was completed, and patient is at LOW risk for falls.Assessment completed on:2023    Depression Screening:  PHQ-2/PHQ-9 Depression Screening 2023   Little Interest or Pleasure in Doing Things 0-->not at all   Feeling Down, Depressed or Hopeless 0-->not at all   PHQ-9: Brief Depression Severity Measure Score 0       Health Habits and Functional and Cognitive Screening:  Functional & Cognitive Status 2023   Do you have difficulty preparing food and eating? No   Do you have difficulty bathing yourself, getting dressed or grooming yourself? No   Do you have difficulty using the toilet? No   Do you have difficulty moving around from place to place? No   Do you have trouble with steps or getting out of a bed or a chair? No   Current Diet Well Balanced Diet   Dental Exam Up to date   Eye Exam Not up to date   Exercise (times per week) 0 times per week   Current Exercises Include No Regular Exercise   Current Exercise Activities Include -   Do you need help using the phone?  No   Are you deaf or do you have serious difficulty hearing?  No   Do you need help with transportation? No   Do you need help shopping? No   Do you need help preparing meals?  No   Do you need help with housework?  No   Do you need help with laundry? No   Do you  need help taking your medications? No   Do you need help managing money? -   Do you ever drive or ride in a car without wearing a seat belt? No   Have you felt unusual stress, anger or loneliness in the last month? -   Who do you live with? -   If you need help, do you have trouble finding someone available to you? -   Have you been bothered in the last four weeks by sexual problems? -   Do you have difficulty concentrating, remembering or making decisions? -       Age-appropriate Screening Schedule:  Refer to the list below for future screening recommendations based on patient's age, sex and/or medical conditions. Orders for these recommended tests are listed in the plan section. The patient has been provided with a written plan.    Health Maintenance   Topic Date Due   • PAP SMEAR  Never done   • TDAP/TD VACCINES (3 - Td or Tdap) 07/15/2023   • MAMMOGRAM  10/01/2023   • DXA SCAN  10/01/2023   • LIPID PANEL  02/13/2024   • INFLUENZA VACCINE  Discontinued   • ZOSTER VACCINE  Discontinued                CMS Preventative Services Quick Reference  Risk Factors Identified During Encounter:    Depression/Dysphoria: Current medication is effective, no change recommended    The above risks/problems have been discussed with the patient.  Pertinent information has been shared with the patient in the After Visit Summary.    Diagnoses and all orders for this visit:    1. Medicare annual wellness visit, subsequent (Primary)    2. Moderate mixed hyperlipidemia not requiring statin therapy    3. Dermatitis  -     hydrocortisone 2.5 % ointment; Apply 1 application topically to the appropriate area as directed 2 (Two) Times a Day.  Dispense: 30 g; Refill: 0    4. Encounter for screening mammogram for malignant neoplasm of breast  -     Mammo Screening Digital Tomosynthesis Bilateral With CAD; Future    5. Essential hypertension  -     hydroCHLOROthiazide (HYDRODIURIL) 25 MG tablet; Take 1 tablet by mouth Daily. Indications: Edema   Dispense: 90 tablet; Refill: 3  -     valsartan (DIOVAN) 320 MG tablet; Take 1 tablet by mouth Daily. Indications: High Blood Pressure Disorder  Dispense: 90 tablet; Refill: 1    6. Mixed hyperlipidemia  -     rosuvastatin (CRESTOR) 10 MG tablet; Take 1 tablet by mouth Daily. Indications: Elevation of Both Cholesterol and Triglycerides in Blood  Dispense: 90 tablet; Refill: 3    7. Arthritis  -     ibuprofen (ADVIL,MOTRIN) 800 MG tablet; Take 1 tablet by mouth Every 8 (Eight) Hours As Needed for Mild Pain.  Dispense: 90 tablet; Refill: 0    8. Anxiety and depression  -     diazePAM (VALIUM) 5 MG tablet; Take 1 tablet by mouth At Night As Needed for Anxiety or Sleep.  Dispense: 20 tablet; Refill: 0    Overall doing well  Anxiety fairly well controlled.  Request a few more as needed Valium    Blood pressure well controlled on hydrochlorothiazide and valsartan    Cholesterol still elevated, we will increase Crestor from 5 to 10 mg daily    Still has a persistent itching rash on her right shin and lower leg.  This began after a injury trauma to her right lower leg last May.  So the rash has been there for 10 months    Requesting refill of her Motrin 800 for generalized arthritic pain    Labs reviewed    Due for a mammogram    Wants no vaccines    Follow Up:   Next Medicare Wellness visit to be scheduled in 1 year.      An After Visit Summary and PPPS were made available to the patient.

## 2023-03-29 DIAGNOSIS — F32.A ANXIETY AND DEPRESSION: ICD-10-CM

## 2023-03-29 DIAGNOSIS — F41.9 ANXIETY AND DEPRESSION: ICD-10-CM

## 2023-03-29 RX ORDER — DIAZEPAM 5 MG/1
5 TABLET ORAL NIGHTLY PRN
Qty: 20 TABLET | Refills: 0 | Status: SHIPPED | OUTPATIENT
Start: 2023-03-29

## 2023-03-29 NOTE — TELEPHONE ENCOUNTER
Caller: Nery Jarrell ANGIE    Relationship: Self    Best call back number: [phone]    Requested Prescriptions:   Requested Prescriptions     Pending Prescriptions Disp Refills   • diazePAM (VALIUM) 5 MG tablet 20 tablet 0     Sig: Take 1 tablet by mouth At Night As Needed for Anxiety or Sleep.        Pharmacy where request should be sent: [facility] [address] [place] [address] [address] - [phone] PH - [phone] FX     Last office visit with prescribing clinician: 2/20/2023   Last telemedicine visit with prescribing clinician: 8/14/2023   Next office visit with prescribing clinician: 8/14/2023     Additional details provided by patient: HAS LESS THAN 3 DAYS.    Does the patient have less than a 3 day supply:  [x] Yes  [] No    Would you like a call back once the refill request has been completed: [] Yes [x] No    If the office needs to give you a call back, can they leave a voicemail: [] Yes [x] No    Tanika Judd, KAYLA   03/29/23 10:46 EDT

## 2023-04-05 ENCOUNTER — OFFICE VISIT (OUTPATIENT)
Dept: FAMILY MEDICINE CLINIC | Facility: CLINIC | Age: 68
End: 2023-04-05
Payer: MEDICARE

## 2023-04-05 VITALS
SYSTOLIC BLOOD PRESSURE: 200 MMHG | TEMPERATURE: 98.4 F | BODY MASS INDEX: 33.13 KG/M2 | HEART RATE: 83 BPM | WEIGHT: 180 LBS | OXYGEN SATURATION: 95 % | DIASTOLIC BLOOD PRESSURE: 100 MMHG | HEIGHT: 62 IN

## 2023-04-05 DIAGNOSIS — I10 ESSENTIAL HYPERTENSION: Primary | ICD-10-CM

## 2023-04-05 DIAGNOSIS — E66.09 CLASS 1 OBESITY DUE TO EXCESS CALORIES WITH SERIOUS COMORBIDITY AND BODY MASS INDEX (BMI) OF 31.0 TO 31.9 IN ADULT: ICD-10-CM

## 2023-04-05 PROCEDURE — 3077F SYST BP >= 140 MM HG: CPT | Performed by: FAMILY MEDICINE

## 2023-04-05 PROCEDURE — 3080F DIAST BP >= 90 MM HG: CPT | Performed by: FAMILY MEDICINE

## 2023-04-05 PROCEDURE — 1160F RVW MEDS BY RX/DR IN RCRD: CPT | Performed by: FAMILY MEDICINE

## 2023-04-05 PROCEDURE — 99213 OFFICE O/P EST LOW 20 MIN: CPT | Performed by: FAMILY MEDICINE

## 2023-04-05 PROCEDURE — 1159F MED LIST DOCD IN RCRD: CPT | Performed by: FAMILY MEDICINE

## 2023-04-05 RX ORDER — METOPROLOL SUCCINATE 50 MG/1
50 TABLET, EXTENDED RELEASE ORAL DAILY
Qty: 30 TABLET | Refills: 1 | Status: SHIPPED | OUTPATIENT
Start: 2023-04-05

## 2023-04-06 NOTE — PROGRESS NOTES
Subjective   Nery Jarrell is a 68 y.o. female with   Chief Complaint   Patient presents with   • Hypertension     Running high, having family problems - checked this /95    .    History of Present Illness   68-year-old white female with known history of essential hypertension here for further medical management.  Patient has noted both at home and at other offices that her blood pressure has been markedly elevated.  She has been under increased stress as of late but is concerned given the level that her blood pressure has achieved.  She denies knowledge of sleep apnea or has ever been tested for same.  Current medications include hydrochlorothiazide at 25 mg daily in combination with valsartan at 320 mg daily.  Both medications are used on a regular basis and are well-tolerated without side effects.  The following portions of the patient's history were reviewed and updated as appropriate: allergies, current medications, past family history, past medical history, past social history, past surgical history and problem list.    Review of Systems   Cardiovascular:        Hypertension       Objective     Vitals:    04/05/23 1453   BP: (!) 200/100   Pulse: 83   Temp: 98.4 °F (36.9 °C)   SpO2: 95%       No results found for this or any previous visit (from the past 672 hour(s)).    Physical Exam  Vitals and nursing note reviewed.   Constitutional:       Appearance: Normal appearance. She is well-developed and well-groomed. She is obese.      Comments: Exogenous obesity with a BMI 32.9   HENT:      Head: Normocephalic and atraumatic.   Neck:      Thyroid: No thyroid mass or thyromegaly.      Vascular: Normal carotid pulses. No carotid bruit.      Trachea: Trachea and phonation normal.   Cardiovascular:      Rate and Rhythm: Normal rate and regular rhythm.      Heart sounds: Normal heart sounds. No murmur heard.    No friction rub. No gallop.   Pulmonary:      Effort: Pulmonary effort is normal. No respiratory distress.       Breath sounds: Normal breath sounds. No decreased breath sounds, wheezing, rhonchi or rales.   Musculoskeletal:      Cervical back: Neck supple.   Lymphadenopathy:      Cervical: No cervical adenopathy.   Skin:     General: Skin is warm and dry.      Findings: No rash.   Neurological:      Mental Status: She is alert and oriented to person, place, and time.   Psychiatric:         Attention and Perception: Attention and perception normal.         Mood and Affect: Mood and affect normal.         Speech: Speech normal.         Behavior: Behavior normal. Behavior is cooperative.         Thought Content: Thought content normal.         Cognition and Memory: Cognition and memory normal.         Judgment: Judgment normal.     Patient's (Body mass index is 32.92 kg/m².) indicates that they are obese (BMI >30) with health related conditions that include none . Weight is unchanged. BMI is  will discuss with her own PCP. We discussed portion control and increasing exercise.     Assessment & Plan   Diagnoses and all orders for this visit:    1. Essential hypertension (Primary)  -     metoprolol succinate XL (Toprol XL) 50 MG 24 hr tablet; Take 1 tablet by mouth Daily.  Dispense: 30 tablet; Refill: 1    2. Class 1 obesity due to excess calories with serious comorbidity and body mass index (BMI) of 31.0 to 31.9 in adult        Return in about 4 weeks (around 5/3/2023) for Recheck with Dr. Parker.

## 2023-04-23 DIAGNOSIS — E78.2 MIXED HYPERLIPIDEMIA: ICD-10-CM

## 2023-04-23 RX ORDER — ROSUVASTATIN CALCIUM 5 MG/1
TABLET, COATED ORAL
Qty: 90 TABLET | Refills: 1 | OUTPATIENT
Start: 2023-04-23

## 2023-05-03 ENCOUNTER — OFFICE VISIT (OUTPATIENT)
Dept: FAMILY MEDICINE CLINIC | Facility: CLINIC | Age: 68
End: 2023-05-03
Payer: MEDICARE

## 2023-05-03 VITALS
WEIGHT: 186 LBS | HEART RATE: 54 BPM | BODY MASS INDEX: 34.23 KG/M2 | TEMPERATURE: 98.1 F | SYSTOLIC BLOOD PRESSURE: 204 MMHG | OXYGEN SATURATION: 98 % | HEIGHT: 62 IN | DIASTOLIC BLOOD PRESSURE: 104 MMHG

## 2023-05-03 DIAGNOSIS — I16.0 HYPERTENSIVE URGENCY: Primary | ICD-10-CM

## 2023-05-03 DIAGNOSIS — I10 ESSENTIAL HYPERTENSION: ICD-10-CM

## 2023-05-03 PROCEDURE — 1160F RVW MEDS BY RX/DR IN RCRD: CPT | Performed by: FAMILY MEDICINE

## 2023-05-03 PROCEDURE — 99213 OFFICE O/P EST LOW 20 MIN: CPT | Performed by: FAMILY MEDICINE

## 2023-05-03 PROCEDURE — 1159F MED LIST DOCD IN RCRD: CPT | Performed by: FAMILY MEDICINE

## 2023-05-03 PROCEDURE — 3080F DIAST BP >= 90 MM HG: CPT | Performed by: FAMILY MEDICINE

## 2023-05-03 PROCEDURE — 3077F SYST BP >= 140 MM HG: CPT | Performed by: FAMILY MEDICINE

## 2023-05-03 RX ORDER — METOPROLOL SUCCINATE 100 MG/1
100 TABLET, EXTENDED RELEASE ORAL
Qty: 30 TABLET | Refills: 0 | Status: SHIPPED | OUTPATIENT
Start: 2023-05-03

## 2023-05-03 NOTE — PROGRESS NOTES
"  Chief Complaint   Patient presents with   • Hypertension       Subjective     Patient here for follow-up of elevated blood pressure.    She is not exercising and is not adherent to a low-salt diet.    Blood pressure is not well controlled at home.   Cardiac symptoms: chest pressure/discomfort and headaches.   Patient denies: irregular heart beat and near-syncope.   Cardiovascular risk factors: advanced age (older than 55 for men, 65 for women), hypertension, male gender, obesity (BMI >= 30 kg/m2) and sedentary lifestyle.   Use of agents associated with hypertension: none.   History of target organ damage: none.  Patient is taking prescribed hypertension medications as prescribed without side effects.  Lots of stress,  had a stroke, daughter had surgery, elderly aunt , her son overdosed on drugs twice.    Blurry vision  Eye md reports retinal hemorrage on right    The following portions of the patient's history were reviewed and updated as appropriate: allergies, current medications, past family history, past medical history, past social history, past surgical history and problem list.    Review of Systems  Pertinent items are noted in HPI.         Vitals:    23 1323   BP: (!) 204/104   Pulse: 54   Temp: 98.1 °F (36.7 °C)   SpO2: 98%   Weight: 84.4 kg (186 lb)   Height: 157.5 cm (62\")     BP Readings from Last 3 Encounters:   23 (!) 204/104   23 (!) 200/100   23 134/84     Objective      Gen: alert, pleasant.  Neck: no bruit, no enlarged thyroid  Lungs: CTA  Heart: RR, no murmur  Feet: no edema  Pulses: intact  Eye: spot of red heme right    Assessment & Plan   Hypertension, stage 2 Evidence of target organ damage: retinopathy.    Diagnoses and all orders for this visit:    1. Hypertensive urgency (Primary)  -     Duplex Renal Artery - Bilateral Complete CAR; Future    2. Essential hypertension  -     metoprolol succinate XL (Toprol XL) 100 MG 24 hr tablet; Take 1 tablet by mouth " every night at bedtime.  Dispense: 30 tablet; Refill: 0  -     Duplex Renal Artery - Bilateral Complete CAR; Future      Medication: increase to 100 of toprol.  Follow up: 1 week and as needed.    There are no Patient Instructions on file for this visit.  Medications Discontinued During This Encounter   Medication Reason   • metoprolol succinate XL (Toprol XL) 50 MG 24 hr tablet Reorder        Return in about 1 week (around 5/10/2023) for blood pressure, new medication follow up, return next wednesday.    Limit salt  Limit alcoholic drinks to 1 a day  Limit caffeine to 1-2 servings a day    Dr. John Parker MD  [place], [place]  [facility].

## 2023-05-06 DIAGNOSIS — E78.2 MIXED HYPERLIPIDEMIA: ICD-10-CM

## 2023-05-07 RX ORDER — ROSUVASTATIN CALCIUM 5 MG/1
TABLET, COATED ORAL
Qty: 90 TABLET | Refills: 1 | OUTPATIENT
Start: 2023-05-07

## 2023-05-10 ENCOUNTER — OFFICE VISIT (OUTPATIENT)
Dept: FAMILY MEDICINE CLINIC | Facility: CLINIC | Age: 68
End: 2023-05-10
Payer: MEDICARE

## 2023-05-10 VITALS
OXYGEN SATURATION: 99 % | WEIGHT: 186 LBS | HEART RATE: 64 BPM | BODY MASS INDEX: 34.23 KG/M2 | SYSTOLIC BLOOD PRESSURE: 138 MMHG | DIASTOLIC BLOOD PRESSURE: 100 MMHG | TEMPERATURE: 97.8 F | HEIGHT: 62 IN

## 2023-05-10 DIAGNOSIS — I10 ESSENTIAL HYPERTENSION: ICD-10-CM

## 2023-05-10 DIAGNOSIS — E78.2 MIXED HYPERLIPIDEMIA: ICD-10-CM

## 2023-05-10 PROCEDURE — 1159F MED LIST DOCD IN RCRD: CPT | Performed by: FAMILY MEDICINE

## 2023-05-10 PROCEDURE — 1160F RVW MEDS BY RX/DR IN RCRD: CPT | Performed by: FAMILY MEDICINE

## 2023-05-10 PROCEDURE — 99213 OFFICE O/P EST LOW 20 MIN: CPT | Performed by: FAMILY MEDICINE

## 2023-05-10 PROCEDURE — 3080F DIAST BP >= 90 MM HG: CPT | Performed by: FAMILY MEDICINE

## 2023-05-10 PROCEDURE — 3075F SYST BP GE 130 - 139MM HG: CPT | Performed by: FAMILY MEDICINE

## 2023-05-10 RX ORDER — METOPROLOL SUCCINATE 100 MG/1
100 TABLET, EXTENDED RELEASE ORAL
Qty: 90 TABLET | Refills: 1 | Status: SHIPPED | OUTPATIENT
Start: 2023-05-10

## 2023-05-10 RX ORDER — VALSARTAN 320 MG/1
320 TABLET ORAL DAILY
Qty: 90 TABLET | Refills: 1 | Status: SHIPPED | OUTPATIENT
Start: 2023-05-10

## 2023-05-10 RX ORDER — ROSUVASTATIN CALCIUM 10 MG/1
10 TABLET, COATED ORAL DAILY
Qty: 90 TABLET | Refills: 3 | Status: SHIPPED | OUTPATIENT
Start: 2023-05-10

## 2023-05-10 RX ORDER — HYDROCHLOROTHIAZIDE 25 MG/1
25 TABLET ORAL DAILY
Qty: 90 TABLET | Refills: 3 | Status: SHIPPED | OUTPATIENT
Start: 2023-05-10

## 2023-05-10 NOTE — PROGRESS NOTES
"  Chief Complaint   Patient presents with   • Hypertension       Subjective     Patient here for follow-up of elevated blood pressure.    She is not exercising and is not adherent to a low-salt diet.    Blood pressure is not well controlled at home.   Cardiac symptoms: none.   Patient denies: chest pressure/discomfort.   Cardiovascular risk factors: hypertension and sedentary lifestyle.   Use of agents associated with hypertension: none.   History of target organ damage: none.  Patient is taking prescribed hypertension medications as prescribed without side effects.  We doubled her metoprolol to 100 mg last week because of elevated blood pressure      The following portions of the patient's history were reviewed and updated as appropriate: allergies, current medications, past family history, past medical history, past social history, past surgical history and problem list.    Review of Systems  Pertinent items are noted in HPI.         Vitals:    05/10/23 1507   BP: 138/100   Pulse: 64   Temp: 97.8 °F (36.6 °C)   SpO2: 99%   Weight: 84.4 kg (186 lb)   Height: 157.5 cm (62\")     BP Readings from Last 3 Encounters:   05/10/23 138/100   05/03/23 (!) 204/104   04/05/23 (!) 200/100     Objective      Gen: alert, pleasant.  Neck: no bruit, no enlarged thyroid  Lungs: CTA  Heart: RR, no murmur  Feet: no edema  Pulses: intact    Assessment & Plan   Hypertension, stage 2 Evidence of target organ damage: none.    Diagnoses and all orders for this visit:    1. Essential hypertension  -     hydroCHLOROthiazide (HYDRODIURIL) 25 MG tablet; Take 1 tablet by mouth Daily. Indications: Edema  Dispense: 90 tablet; Refill: 3  -     metoprolol succinate XL (Toprol XL) 100 MG 24 hr tablet; Take 1 tablet by mouth every night at bedtime. Indications: High Blood Pressure Disorder  Dispense: 90 tablet; Refill: 1  -     valsartan (DIOVAN) 320 MG tablet; Take 1 tablet by mouth Daily. Indications: High Blood Pressure Disorder  Dispense: 90 " tablet; Refill: 1    2. Mixed hyperlipidemia  -     rosuvastatin (CRESTOR) 10 MG tablet; Take 1 tablet by mouth Daily. Indications: Elevation of Both Cholesterol and Triglycerides in Blood  Dispense: 90 tablet; Refill: 3    Blood pressure is a little bit better today.  Diastolic not quite to goal  She would like all of her medications be transferred to the local [facility] pharmacy.  She will have her renal artery Doppler scan later on this week    Medication: no change.  Follow up: 3 months and as needed.    There are no Patient Instructions on file for this visit.  Medications Discontinued During This Encounter   Medication Reason   • hydroCHLOROthiazide (HYDRODIURIL) 25 MG tablet Reorder   • rosuvastatin (CRESTOR) 10 MG tablet Reorder   • valsartan (DIOVAN) 320 MG tablet Reorder   • metoprolol succinate XL (Toprol XL) 100 MG 24 hr tablet Reorder        Return in about 3 months (around 8/10/2023) for Annual physical, blood pressure, new medication follow up.    Limit salt  Limit alcoholic drinks to 1 a day  Limit caffeine to 1-2 servings a day    Dr. John Parker MD  [place]  [facility].

## 2023-05-16 ENCOUNTER — HOSPITAL ENCOUNTER (OUTPATIENT)
Dept: CARDIOLOGY | Facility: HOSPITAL | Age: 68
Discharge: HOME | End: 2023-05-16
Admitting: FAMILY MEDICINE
Payer: MEDICARE

## 2023-05-16 DIAGNOSIS — I16.0 HYPERTENSIVE URGENCY: ICD-10-CM

## 2023-05-16 DIAGNOSIS — I10 ESSENTIAL HYPERTENSION: ICD-10-CM

## 2023-05-16 LAB
BH CV ECHO MEAS - DIST REN A EDV LEFT: 11.8 CM/S
BH CV ECHO MEAS - DIST REN A PSV LEFT: 98.8 CM/S
BH CV ECHO MEAS - MID REN A EDV LEFT: 21.4 CM/S
BH CV ECHO MEAS - MID REN A PSV LEFT: 84.8 CM/S
BH CV ECHO MEAS - PROX REN A EDV LEFT: -20.7 CM/S
BH CV ECHO MEAS - PROX REN A PSV LEFT: -104 CM/S
BH CV VAS BP LEFT ARM: NORMAL MMHG
BH CV VAS BP RIGHT ARM: NORMAL MMHG
BH CV VAS RENAL AORTIC MID EDV: 9 CM/S
BH CV VAS RENAL AORTIC MID PSV: 81 CM/S
BH CV VAS RENAL HILUM LEFT EDV: 7 CM/S
BH CV VAS RENAL HILUM LEFT PSV: 35 CM/S
BH CV VAS RENAL HILUM RIGHT EDV: 7 CM/S
BH CV VAS RENAL HILUM RIGHT PSV: 29 CM/S
BH CV XLRA MEAS - KID L LEFT: 9.8 CM
BH CV XLRA MEAS DIST REN A EDV RIGHT: 25.5 CM/S
BH CV XLRA MEAS DIST REN A PSV RIGHT: 97.9 CM/S
BH CV XLRA MEAS KID L RIGHT: 9.5 CM
BH CV XLRA MEAS KID W RIGHT: 4.3 CM
BH CV XLRA MEAS MID REN A EDV RIGHT: 17 CM/S
BH CV XLRA MEAS MID REN A PSV RIGHT: 118 CM/S
BH CV XLRA MEAS PROX REN A EDV RIGHT: 12.8 CM/S
BH CV XLRA MEAS PROX REN A PSV RIGHT: 65.6 CM/S
BH CV XLRA MEAS RAR LEFT: 1.28
BH CV XLRA MEAS RAR RIGHT: 1.45
BH CV XLRA MEAS RENAL A ORG EDV LEFT: 22.3 CM/S
BH CV XLRA MEAS RENAL A ORG EDV RIGHT: 19.5 CM/S
BH CV XLRA MEAS RENAL A ORG PSV LEFT: 101 CM/S
BH CV XLRA MEAS RENAL A ORG PSV RIGHT: 77.9 CM/S
LEFT KIDNEY WIDTH: 5 CM
LEFT RENAL UPPER PARENCHYMA MAX: 21 CM/S
LEFT RENAL UPPER PARENCHYMA MIN: 5 CM/S
LEFT RENAL UPPER PARENCHYMA RI: 0.76
MAXIMAL PREDICTED HEART RATE: 151 BPM
RIGHT RENAL UPPER PARENCHYMA MAX: 24 CM/S
RIGHT RENAL UPPER PARENCHYMA MIN: 6 CM/S
RIGHT RENAL UPPER PARENCHYMA RI: 0.75
STRESS TARGET HR: 128 BPM

## 2023-05-16 PROCEDURE — 93975 VASCULAR STUDY: CPT

## 2023-06-09 DIAGNOSIS — M19.90 ARTHRITIS: ICD-10-CM

## 2023-06-09 RX ORDER — IBUPROFEN 800 MG/1
800 TABLET ORAL EVERY 8 HOURS PRN
Qty: 90 TABLET | Refills: 0 | Status: SHIPPED | OUTPATIENT
Start: 2023-06-09

## 2023-06-09 NOTE — TELEPHONE ENCOUNTER
Caller: Wesly Nery A    Relationship: Self    Best call back number: [phone] (Mobile)     Requested Prescriptions:   Requested Prescriptions     Pending Prescriptions Disp Refills    ibuprofen (ADVIL,MOTRIN) 800 MG tablet 90 tablet 0     Sig: Take 1 tablet by mouth Every 8 (Eight) Hours As Needed for Mild Pain.        Pharmacy where request should be sent: [facility] [address] [place] [address] [address] - [phone]  - [phone] FX     Last office visit with prescribing clinician: 5/10/2023   Last telemedicine visit with prescribing clinician: Visit date not found   Next office visit with prescribing clinician: 8/14/2023     Additional details provided by patient: PATIENT IS SWITCHING FROM [facility] TO [facility] AND IS ASKING FOR A PRESCRIPTION TO BE SENT TO THE PHARMACY ASAP, PLEASE ADVISE PATIENT WHEN SENT TO PHARMACY ASAP    Does the patient have less than a 3 day supply:  [x] Yes  [] No    Would you like a call back once the refill request has been completed: [x] Yes [] No    If the office needs to give you a call back, can they leave a voicemail: [x] Yes [] No    Shayan Webb   06/09/23 14:10 EDT

## 2023-06-30 ENCOUNTER — TELEPHONE (OUTPATIENT)
Dept: FAMILY MEDICINE CLINIC | Facility: CLINIC | Age: 68
End: 2023-06-30

## 2023-06-30 DIAGNOSIS — M19.90 ARTHRITIS: Primary | ICD-10-CM

## 2023-06-30 NOTE — TELEPHONE ENCOUNTER
Caller: Nery Jarrell    Relationship: Self    Best call back number:     [phone] (Mobile)       What is the medical concern/diagnosis: PAIN IN BACK     What specialty or service is being requested: VITALITY PAIN       What is the provider, practice or medical service name: DR DOMINGUEZ     What is the office location: [address] [place] [address]     What is the office phone number: [phone]

## 2023-07-26 ENCOUNTER — HOSPITAL ENCOUNTER (OUTPATIENT)
Dept: GENERAL RADIOLOGY | Facility: HOSPITAL | Age: 68
Discharge: HOME | End: 2023-07-26
Payer: MEDICARE

## 2023-07-26 ENCOUNTER — TRANSCRIBE ORDERS (OUTPATIENT)
Dept: ADMINISTRATIVE | Facility: HOSPITAL | Age: 68
End: 2023-07-26
Payer: MEDICARE

## 2023-07-26 DIAGNOSIS — M06.9 RHEUMATOID ARTHRITIS INVOLVING BOTH HANDS, UNSPECIFIED WHETHER RHEUMATOID FACTOR PRESENT: ICD-10-CM

## 2023-07-26 DIAGNOSIS — M54.16 LUMBAR RADICULOPATHY: ICD-10-CM

## 2023-07-26 DIAGNOSIS — M19.90 ARTHRITIS: Primary | ICD-10-CM

## 2023-07-26 DIAGNOSIS — M06.9 RHEUMATOID ARTHRITIS OF WRIST, UNSPECIFIED LATERALITY, UNSPECIFIED WHETHER RHEUMATOID FACTOR PRESENT: ICD-10-CM

## 2023-07-26 DIAGNOSIS — M19.90 ARTHRITIS: ICD-10-CM

## 2023-07-26 PROCEDURE — 72120 X-RAY BEND ONLY L-S SPINE: CPT

## 2023-07-26 PROCEDURE — 73110 X-RAY EXAM OF WRIST: CPT

## 2023-07-26 PROCEDURE — 73130 X-RAY EXAM OF HAND: CPT

## 2023-07-26 PROCEDURE — 73522 X-RAY EXAM HIPS BI 3-4 VIEWS: CPT

## 2023-08-14 ENCOUNTER — OFFICE VISIT (OUTPATIENT)
Dept: FAMILY MEDICINE CLINIC | Facility: CLINIC | Age: 68
End: 2023-08-14
Payer: MEDICARE

## 2023-08-14 VITALS
SYSTOLIC BLOOD PRESSURE: 124 MMHG | HEIGHT: 62 IN | WEIGHT: 184 LBS | OXYGEN SATURATION: 95 % | DIASTOLIC BLOOD PRESSURE: 80 MMHG | HEART RATE: 47 BPM | BODY MASS INDEX: 33.86 KG/M2 | TEMPERATURE: 98 F

## 2023-08-14 DIAGNOSIS — I10 ESSENTIAL HYPERTENSION: Primary | ICD-10-CM

## 2023-08-14 DIAGNOSIS — E78.2 MIXED HYPERLIPIDEMIA: ICD-10-CM

## 2023-08-14 PROBLEM — M54.16 LUMBAR RADICULOPATHY: Status: ACTIVE | Noted: 2023-08-14

## 2023-08-14 PROCEDURE — 1159F MED LIST DOCD IN RCRD: CPT | Performed by: FAMILY MEDICINE

## 2023-08-14 PROCEDURE — 1160F RVW MEDS BY RX/DR IN RCRD: CPT | Performed by: FAMILY MEDICINE

## 2023-08-14 PROCEDURE — 3079F DIAST BP 80-89 MM HG: CPT | Performed by: FAMILY MEDICINE

## 2023-08-14 PROCEDURE — 99214 OFFICE O/P EST MOD 30 MIN: CPT | Performed by: FAMILY MEDICINE

## 2023-08-14 PROCEDURE — 3074F SYST BP LT 130 MM HG: CPT | Performed by: FAMILY MEDICINE

## 2023-08-14 RX ORDER — CELECOXIB 100 MG/1
100 CAPSULE ORAL 2 TIMES DAILY PRN
COMMUNITY

## 2023-08-14 RX ORDER — VIT C/B6/B5/MAGNESIUM/HERB 173 50-5-6-5MG
2 CAPSULE ORAL
COMMUNITY

## 2023-08-14 NOTE — PROGRESS NOTES
"  Chief Complaint   Patient presents with    Hypertension       Subjective     Patient here for follow-up of elevated blood pressure.    She is not exercising and is adherent to a low-salt diet.    Blood pressure is well controlled at home.   Cardiac symptoms: dyspnea and fatigue.   Patient denies: chest pain.   Cardiovascular risk factors: advanced age (older than 55 for men, 65 for women), dyslipidemia, family history of premature cardiovascular disease, hypertension, obesity (BMI >= 30 kg/m2), and sedentary lifestyle.   Use of agents associated with hypertension: NSAIDS.   History of target organ damage: none.  Patient is taking prescribed hypertension medications as prescribed without side effects.    The following portions of the patient's history were reviewed and updated as appropriate: allergies, current medications, past family history, past medical history, past social history, past surgical history, and problem list.    Review of Systems  Musculoskeletal:positive for arthralgias, back pain, and stiff joints         Vitals:    08/14/23 1238   BP: 124/80   Pulse: (!) 47   Temp: 98 øF (36.7 øC)   SpO2: 95%   Weight: 83.5 kg (184 lb)   Height: 157.5 cm (62.01\")     BP Readings from Last 3 Encounters:   08/14/23 124/80   05/10/23 138/100   05/03/23 (!) 204/104     Objective      Gen: alert, pleasant.  Neck: no bruit, no enlarged thyroid  Lungs: CTA  Heart: RR, no murmur  Feet: no edema  Pulses: intact  Duplex Renal Artery - Bilateral Complete CAR (05/16/2023 10:42)   Assessment & Plan   Hypertension, normal blood pressure Evidence of target organ damage: none.    Diagnoses and all orders for this visit:    1. Essential hypertension (Primary)  -     CBC (No Diff); Future  -     Comprehensive Metabolic Panel; Future  -     Lipid Panel; Future  -     TSH Rfx On Abnormal To Free T4; Future  -     Urinalysis With Microscopic If Indicated (No Culture) - Urine, Clean Catch; Future    2. Mixed hyperlipidemia  -     " Lipid Panel; Future  -     TSH Rfx On Abnormal To Free T4; Future    Now on Celebrex for joint pain.    Medication: no change.  Follow up: 6 months and as needed.    There are no Patient Instructions on file for this visit.  There are no discontinued medications.     No follow-ups on file.    Limit salt  Limit alcoholic drinks to 1 a day  Limit caffeine to 1-2 servings a day    Dr. John Parker MD  [place], [place]  [facility].

## 2023-12-27 DIAGNOSIS — I10 ESSENTIAL HYPERTENSION: ICD-10-CM

## 2023-12-27 RX ORDER — METOPROLOL SUCCINATE 100 MG/1
TABLET, EXTENDED RELEASE ORAL
Qty: 90 TABLET | Refills: 0 | Status: SHIPPED | OUTPATIENT
Start: 2023-12-27

## 2024-03-28 DIAGNOSIS — I10 ESSENTIAL HYPERTENSION: ICD-10-CM

## 2024-03-28 RX ORDER — METOPROLOL SUCCINATE 100 MG/1
TABLET, EXTENDED RELEASE ORAL
Qty: 90 TABLET | Refills: 3 | Status: SHIPPED | OUTPATIENT
Start: 2024-03-28

## 2024-05-13 ENCOUNTER — TRANSCRIBE ORDERS (OUTPATIENT)
Dept: ADMINISTRATIVE | Facility: HOSPITAL | Age: 69
End: 2024-05-13
Payer: MEDICARE

## 2024-05-13 DIAGNOSIS — Z12.31 VISIT FOR SCREENING MAMMOGRAM: Primary | ICD-10-CM

## 2024-07-15 ENCOUNTER — HOSPITAL ENCOUNTER (EMERGENCY)
Facility: HOSPITAL | Age: 69
Discharge: HOME | End: 2024-07-15
Attending: EMERGENCY MEDICINE
Payer: MEDICARE

## 2024-07-15 ENCOUNTER — APPOINTMENT (OUTPATIENT)
Dept: GENERAL RADIOLOGY | Facility: HOSPITAL | Age: 69
End: 2024-07-15
Payer: MEDICARE

## 2024-07-15 ENCOUNTER — APPOINTMENT (OUTPATIENT)
Dept: CT IMAGING | Facility: HOSPITAL | Age: 69
End: 2024-07-15
Payer: MEDICARE

## 2024-07-15 VITALS
OXYGEN SATURATION: 97 % | SYSTOLIC BLOOD PRESSURE: 222 MMHG | RESPIRATION RATE: 18 BRPM | HEART RATE: 51 BPM | WEIGHT: 189 LBS | HEIGHT: 62 IN | DIASTOLIC BLOOD PRESSURE: 97 MMHG | BODY MASS INDEX: 34.78 KG/M2 | TEMPERATURE: 97.7 F

## 2024-07-15 DIAGNOSIS — S05.12XA ORBITAL CONTUSION, LEFT, INITIAL ENCOUNTER: ICD-10-CM

## 2024-07-15 DIAGNOSIS — S80.212A ABRASION OF LEFT KNEE, INITIAL ENCOUNTER: ICD-10-CM

## 2024-07-15 DIAGNOSIS — M17.12 TRICOMPARTMENT OSTEOARTHRITIS OF LEFT KNEE: ICD-10-CM

## 2024-07-15 DIAGNOSIS — S50.12XA CONTUSION OF LEFT FOREARM, INITIAL ENCOUNTER: Primary | ICD-10-CM

## 2024-07-15 PROCEDURE — 99284 EMERGENCY DEPT VISIT MOD MDM: CPT

## 2024-07-15 PROCEDURE — 73090 X-RAY EXAM OF FOREARM: CPT

## 2024-07-15 PROCEDURE — 70480 CT ORBIT/EAR/FOSSA W/O DYE: CPT

## 2024-07-15 PROCEDURE — 73562 X-RAY EXAM OF KNEE 3: CPT

## 2024-07-15 RX ORDER — NAPROXEN 250 MG/1
500 TABLET ORAL ONCE
Status: DISCONTINUED | OUTPATIENT
Start: 2024-07-15 | End: 2024-07-15 | Stop reason: HOSPADM

## 2024-07-15 NOTE — DISCHARGE INSTRUCTIONS
Rest and increase fluids.  Continue your home medications.  You may use Tylenol or Motrin as needed for discomfort.  Follow-up with your primary care as discussed.  Return to the emergency department if symptoms get worse or change.

## 2024-07-15 NOTE — ED PROVIDER NOTES
Subjective   History of Present Illness  69-year-old  obese female patient presented to the emergency department via private vehicle with a chief complaint of fall with injury.  Patient states she was working around her pool when she accidentally tripped on a pool float and fell.  She states she injured her left eye, left forearm and left knee.  She states she has existing left knee issues with osteoarthritis.  She states that she has some swelling on the left forearm and left orbit.  She states she is not in any significant pain.  She states she just wanted to get checked out to make sure there is nothing serious going on.    History provided by:  Medical records and patient      Review of Systems   Constitutional: Negative.    HENT: Negative.     Respiratory: Negative.     Cardiovascular: Negative.    Gastrointestinal: Negative.    Genitourinary: Negative.    Musculoskeletal:  Positive for arthralgias. Negative for back pain, gait problem, joint swelling, myalgias, neck pain and neck stiffness.   Skin:  Positive for color change (Contusion) and wound. Negative for pallor and rash.   Neurological: Negative.    Psychiatric/Behavioral: Negative.     All other systems reviewed and are negative.      Past Medical History:   Diagnosis Date    Abdominal wall mass 03/2014    Abnormal mammogram 09/04/2018    Abnormal mammogram 06/2015    Anxiety     Colon polyps     Colon stricture 07/17/2019    Foot deformity     GERD (gastroesophageal reflux disease)     Left breast mass 06/2015    OA (osteoarthritis)     Obesity     Post-menopausal     Vitamin B 12 deficiency        Allergies   Allergen Reactions    Dextromethorphan Unknown - Low Severity    Elavil [Amitriptyline Hcl] Dizziness    Norvasc [Amlodipine Besylate] Swelling     Ankles swelling       Past Surgical History:   Procedure Laterality Date    BILATERAL BREAST REDUCTION Bilateral 2003    CARDIAC CATHETERIZATION Right 01/08/2014    MILD NONOBSTRUCTIVE CAD,  "NORMAL LEFT VENTRICULAR SYSTOLIC FUNCTION, LEFT VENTRICULAR HYPERTROPHY, DR. LESTER HECK AT [facility]     SECTION N/A 1976     SECTION N/A 1979    CHOLECYSTECTOMY N/A 2014    LAPAROSCOPIC WITH CHOLANGIOGRAM, DR. TRAY NARVAEZ AT [facility]    COLONOSCOPY N/A 2003    GOOD PREP, NONSPECIFIC ILEITIS, SIGMOID DIVERTICULOSIS, DR. AURY SOTELO AT [facility]    COLONOSCOPY N/A 2019    SIGMOID STRICTURE, DIVERTICULOSIS, PELVIC ADHESIONS, DR. AURY SOTELO AT  [facility]    COLONOSCOPY W/ POLYPECTOMY N/A 2003    LARGE INTERNAL HEMORRHOIDS, 3 MM HYPERPLASTIC POLYP IN RECTUM, SCATTERED DIVERTICULOSIS, DR. ADOLFO ISLAS AT [facility]     ERCP N/A 01/10/2014    CHOLEDOCHOLITHIASIS WAS FOUND, COMPLETE REMOVAL BY BILIARY SPHINCTEROTOMY AND BALLOON EXTRACTION, DR. MARGA MARAVILLA AT [facility]    ERCP N/A 2014    PRIOR BILIARY SPHINCTEROTOMY APPEARED STENOSED, DR. MARGA MARAVILLA AT [facility]    ERCP WITH FOREIGN BODY REMOVAL N/A 2014    PRIOR BILIARY SPHINCTEROTOMY APPEARD OPEN, 1 VISIBLY OCCLUDED STENT FROM THE BILIARY TREE WAS IN THE MAJOR PAPILLA, STENT REMOVED FROM BILIARY TREE, DR. MARGA MARAVILLA AT [facility]    MOUTH SURGERY N/A 2018    REDUCTION MAMMAPLASTY         Family History   Problem Relation Age of Onset    COPD Mother     Emphysema Mother     Heart disease Mother     Heart disease Father     Kidney disease Father     Vision loss Father     Cancer Father     Diabetes Father     Lymphoma Father     Stroke Brother     Hypertension Brother     Coronary artery disease Brother         stent    Peripheral vascular disease Brother         stent in the carotid    Diabetes Brother     No Known Problems Daughter     Drug abuse Son     Breast cancer Maternal Aunt        Social History     Socioeconomic History    Marital status:      Spouse name: Venu\"Miguel\"    Number of children: 2    Years of education: College degree   Tobacco Use    Smoking status: Former     Current packs/day: " 0.00     Average packs/day: 1 pack/day for 11.0 years (11.0 ttl pk-yrs)     Types: Cigarettes     Start date:      Quit date:      Years since quittin.5    Smokeless tobacco: Never   Vaping Use    Vaping status: Never Used   Substance and Sexual Activity    Alcohol use: Yes     Comment: 0-1 per month    Drug use: No    Sexual activity: Yes     Partners: Male     Birth control/protection: Post-menopausal           Objective   Physical Exam  Vitals and nursing note reviewed.   Constitutional:       Appearance: Normal appearance. She is normal weight.   HENT:      Head: Normocephalic.      Right Ear: External ear normal.      Left Ear: External ear normal.      Nose: Nose normal.      Mouth/Throat:      Mouth: Mucous membranes are moist.      Pharynx: Oropharynx is clear.   Eyes:      Extraocular Movements: Extraocular movements intact.      Conjunctiva/sclera: Conjunctivae normal.      Pupils: Pupils are equal, round, and reactive to light.   Cardiovascular:      Rate and Rhythm: Normal rate and regular rhythm.      Pulses: Normal pulses.      Heart sounds: Normal heart sounds.   Pulmonary:      Effort: Pulmonary effort is normal.      Breath sounds: Normal breath sounds.   Abdominal:      General: Bowel sounds are normal.      Palpations: Abdomen is soft.   Musculoskeletal:         General: Tenderness and signs of injury present. Normal range of motion.      Left forearm: Swelling and tenderness present.      Cervical back: Normal range of motion and neck supple.      Left knee: Swelling present.        Legs:       Comments: Hematoma to left forearm   Skin:     General: Skin is warm and dry.      Capillary Refill: Capillary refill takes less than 2 seconds.   Neurological:      General: No focal deficit present.      Mental Status: She is alert and oriented to person, place, and time.   Psychiatric:         Mood and Affect: Mood normal.         Behavior: Behavior normal.         Thought Content: Thought  content normal.         Judgment: Judgment normal.         Procedures           ED Course                                             Medical Decision Making  Differential diagnosis includes abrasion, contusion, hematoma, fracture, dislocation, and soft tissue injury.    XR Forearm 2 View Left    Result Date: 7/15/2024  Impression: No evidence for acute left forearm fracture. Electronically Signed: Cornel Nicholas MD  7/15/2024 3:32 PM EDT  Workstation ID: [id number]    XR Knee 3 View Left    Result Date: 7/15/2024  Impression: No acute abnormality of the left knee. Moderate tricompartmental degenerative changes with medial compartment joint space narrowing. Electronically Signed: Cornel Nicholas MD  7/15/2024 3:31 PM EDT  Workstation ID: [id number]    CT Orbits Without Contrast    Result Date: 7/15/2024  Impression: Preseptal soft tissue edema overlying the left eye. No evidence of postseptal extension. No evidence of fracture. Electronically Signed: Cornel Nicholas MD  7/15/2024 3:24 PM EDT  Workstation ID: [id number]      Discussed imaging and assessment findings.  Patient has no acute fractures or dislocations on imaging.  Patient has tricompartmental osteoarthritis of the left knee.  She also has an abrasion to the left knee.  She has a hematoma/contusion to the left forearm and a contusion to the left orbital/upper eyelid area.  She was instructed to use Tylenol or Motrin as directed.  She may use ice and heat as directed.  She should follow-up with her PCP as discussed.  Patient understands and agrees to discharge plan.    Problems Addressed:  Abrasion of left knee, initial encounter: acute illness or injury  Contusion of left forearm, initial encounter: acute illness or injury  Orbital contusion, left, initial encounter: acute illness or injury  Tricompartment osteoarthritis of left knee: acute illness or injury    Amount and/or Complexity of Data Reviewed  Radiology: ordered. Decision-making details documented in ED  Course.    Risk  Prescription drug management.        Final diagnoses:   Contusion of left forearm, initial encounter   Orbital contusion, left, initial encounter   Abrasion of left knee, initial encounter   Tricompartment osteoarthritis of left knee       ED Disposition  ED Disposition       ED Disposition   Discharge    Condition   Stable    Comment   --               Cornelio Rosa, APRN  [address] [address]  [phone]    Schedule an appointment as soon as possible for a visit       [facility] EMERGENCY DEPARTMENT  [facility] [facility] [id number] [phone]    If symptoms worsen         Medication List      No changes were made to your prescriptions during this visit.            Lv Murcia, APRN  07/15/24 [id number]

## 2024-07-30 ENCOUNTER — HOSPITAL ENCOUNTER (OUTPATIENT)
Dept: MAMMOGRAPHY | Facility: HOSPITAL | Age: 69
Discharge: HOME | End: 2024-07-30
Admitting: NURSE PRACTITIONER
Payer: MEDICARE

## 2024-07-30 DIAGNOSIS — Z12.31 VISIT FOR SCREENING MAMMOGRAM: ICD-10-CM

## 2024-07-30 PROCEDURE — 77063 BREAST TOMOSYNTHESIS BI: CPT

## 2024-07-30 PROCEDURE — 77067 SCR MAMMO BI INCL CAD: CPT | Performed by: RADIOLOGY

## 2024-07-30 PROCEDURE — 77063 BREAST TOMOSYNTHESIS BI: CPT | Performed by: RADIOLOGY

## 2024-07-30 PROCEDURE — 77067 SCR MAMMO BI INCL CAD: CPT

## 2025-04-08 ENCOUNTER — APPOINTMENT (OUTPATIENT)
Dept: MRI IMAGING | Facility: HOSPITAL | Age: 70
End: 2025-04-08
Payer: MEDICARE

## 2025-04-08 ENCOUNTER — APPOINTMENT (OUTPATIENT)
Dept: GENERAL RADIOLOGY | Facility: HOSPITAL | Age: 70
End: 2025-04-08
Payer: MEDICARE

## 2025-04-08 ENCOUNTER — APPOINTMENT (OUTPATIENT)
Dept: CT IMAGING | Facility: HOSPITAL | Age: 70
End: 2025-04-08
Payer: MEDICARE

## 2025-04-08 ENCOUNTER — HOSPITAL ENCOUNTER (OUTPATIENT)
Facility: HOSPITAL | Age: 70
Setting detail: OBSERVATION
Discharge: HOME HEALTH | End: 2025-04-09
Attending: EMERGENCY MEDICINE | Admitting: HOSPITALIST
Payer: MEDICARE

## 2025-04-08 DIAGNOSIS — I63.9 CEREBROVASCULAR ACCIDENT (CVA), UNSPECIFIED MECHANISM: Primary | ICD-10-CM

## 2025-04-08 DIAGNOSIS — R42 DIZZINESS: ICD-10-CM

## 2025-04-08 DIAGNOSIS — R00.1 BRADYCARDIA, UNSPECIFIED: ICD-10-CM

## 2025-04-08 DIAGNOSIS — R20.2 HAND PARESTHESIA: ICD-10-CM

## 2025-04-08 DIAGNOSIS — Z91.89 AT RISK FOR OBSTRUCTIVE SLEEP APNEA: ICD-10-CM

## 2025-04-08 LAB
ALBUMIN SERPL-MCNC: 4.4 G/DL (ref 3.5–5.2)
ALBUMIN/GLOB SERPL: 2 G/DL
ALP SERPL-CCNC: 65 U/L (ref 39–117)
ALT SERPL W P-5'-P-CCNC: 24 U/L (ref 1–33)
ANION GAP SERPL CALCULATED.3IONS-SCNC: 13.6 MMOL/L (ref 5–15)
AST SERPL-CCNC: 27 U/L (ref 1–32)
BACTERIA UR QL AUTO: ABNORMAL /HPF
BASOPHILS # BLD AUTO: 0.05 10*3/MM3 (ref 0–0.2)
BASOPHILS NFR BLD AUTO: 0.7 % (ref 0–1.5)
BILIRUB SERPL-MCNC: 0.3 MG/DL (ref 0–1.2)
BILIRUB UR QL STRIP: NEGATIVE
BUN SERPL-MCNC: 22 MG/DL (ref 8–23)
BUN/CREAT SERPL: 23.9 (ref 7–25)
CALCIUM SPEC-SCNC: 9.7 MG/DL (ref 8.6–10.5)
CHLORIDE SERPL-SCNC: 105 MMOL/L (ref 98–107)
CHOLEST SERPL-MCNC: 421 MG/DL (ref 0–200)
CLARITY UR: CLEAR
CO2 SERPL-SCNC: 24.4 MMOL/L (ref 22–29)
COLOR UR: YELLOW
CREAT SERPL-MCNC: 0.92 MG/DL (ref 0.57–1)
DEPRECATED RDW RBC AUTO: 42.9 FL (ref 37–54)
EGFRCR SERPLBLD CKD-EPI 2021: 66.7 ML/MIN/1.73
EOSINOPHIL # BLD AUTO: 0.15 10*3/MM3 (ref 0–0.4)
EOSINOPHIL NFR BLD AUTO: 2 % (ref 0.3–6.2)
ERYTHROCYTE [DISTWIDTH] IN BLOOD BY AUTOMATED COUNT: 13.4 % (ref 12.3–15.4)
GEN 5 1HR TROPONIN T REFLEX: 7 NG/L
GLOBULIN UR ELPH-MCNC: 2.2 GM/DL
GLUCOSE BLDC GLUCOMTR-MCNC: 122 MG/DL (ref 70–130)
GLUCOSE BLDC GLUCOMTR-MCNC: 92 MG/DL (ref 70–130)
GLUCOSE BLDC GLUCOMTR-MCNC: 96 MG/DL (ref 70–130)
GLUCOSE SERPL-MCNC: 115 MG/DL (ref 65–99)
GLUCOSE UR STRIP-MCNC: NEGATIVE MG/DL
HBA1C MFR BLD: 6.05 % (ref 4.8–5.6)
HCT VFR BLD AUTO: 43.6 % (ref 34–46.6)
HDLC SERPL-MCNC: 57 MG/DL (ref 40–60)
HGB BLD-MCNC: 14.6 G/DL (ref 12–15.9)
HGB UR QL STRIP.AUTO: ABNORMAL
HOLD SPECIMEN: NORMAL
HOLD SPECIMEN: NORMAL
HYALINE CASTS UR QL AUTO: ABNORMAL /LPF
IMM GRANULOCYTES # BLD AUTO: 0.02 10*3/MM3 (ref 0–0.05)
IMM GRANULOCYTES NFR BLD AUTO: 0.3 % (ref 0–0.5)
KETONES UR QL STRIP: NEGATIVE
LDLC SERPL CALC-MCNC: 318 MG/DL (ref 0–100)
LDLC/HDLC SERPL: 5.65 {RATIO}
LEUKOCYTE ESTERASE UR QL STRIP.AUTO: NEGATIVE
LYMPHOCYTES # BLD AUTO: 1.87 10*3/MM3 (ref 0.7–3.1)
LYMPHOCYTES NFR BLD AUTO: 25.5 % (ref 19.6–45.3)
MAGNESIUM SERPL-MCNC: 2 MG/DL (ref 1.6–2.4)
MCH RBC QN AUTO: 29.8 PG (ref 26.6–33)
MCHC RBC AUTO-ENTMCNC: 33.5 G/DL (ref 31.5–35.7)
MCV RBC AUTO: 89 FL (ref 79–97)
MONOCYTES # BLD AUTO: 0.69 10*3/MM3 (ref 0.1–0.9)
MONOCYTES NFR BLD AUTO: 9.4 % (ref 5–12)
MUCOUS THREADS URNS QL MICRO: ABNORMAL /HPF
NEUTROPHILS NFR BLD AUTO: 4.55 10*3/MM3 (ref 1.7–7)
NEUTROPHILS NFR BLD AUTO: 62.1 % (ref 42.7–76)
NITRITE UR QL STRIP: NEGATIVE
NRBC BLD AUTO-RTO: 0 /100 WBC (ref 0–0.2)
PH UR STRIP.AUTO: 6 [PH] (ref 4.5–8)
PLATELET # BLD AUTO: 186 10*3/MM3 (ref 140–450)
PMV BLD AUTO: 11.4 FL (ref 6–12)
POTASSIUM SERPL-SCNC: 4.3 MMOL/L (ref 3.5–5.2)
PROT SERPL-MCNC: 6.6 G/DL (ref 6–8.5)
PROT UR QL STRIP: ABNORMAL
QT INTERVAL: 457 MS
QTC INTERVAL: 390 MS
RBC # BLD AUTO: 4.9 10*6/MM3 (ref 3.77–5.28)
RBC # UR STRIP: ABNORMAL /HPF
REF LAB TEST METHOD: ABNORMAL
SODIUM SERPL-SCNC: 143 MMOL/L (ref 136–145)
SP GR UR STRIP: 1.04 (ref 1–1.03)
SQUAMOUS #/AREA URNS HPF: ABNORMAL /HPF
TRIGL SERPL-MCNC: 210 MG/DL (ref 0–150)
TROPONIN T NUMERIC DELTA: -1 NG/L
TROPONIN T SERPL HS-MCNC: 8 NG/L
UROBILINOGEN UR QL STRIP: ABNORMAL
VLDLC SERPL-MCNC: 46 MG/DL (ref 5–40)
WBC # UR STRIP: ABNORMAL /HPF
WBC NRBC COR # BLD AUTO: 7.33 10*3/MM3 (ref 3.4–10.8)
WHOLE BLOOD HOLD COAG: NORMAL
WHOLE BLOOD HOLD SPECIMEN: NORMAL

## 2025-04-08 PROCEDURE — 83036 HEMOGLOBIN GLYCOSYLATED A1C: CPT | Performed by: HOSPITALIST

## 2025-04-08 PROCEDURE — 82948 REAGENT STRIP/BLOOD GLUCOSE: CPT

## 2025-04-08 PROCEDURE — 83735 ASSAY OF MAGNESIUM: CPT | Performed by: EMERGENCY MEDICINE

## 2025-04-08 PROCEDURE — G0378 HOSPITAL OBSERVATION PER HR: HCPCS

## 2025-04-08 PROCEDURE — 93005 ELECTROCARDIOGRAM TRACING: CPT | Performed by: EMERGENCY MEDICINE

## 2025-04-08 PROCEDURE — 81001 URINALYSIS AUTO W/SCOPE: CPT | Performed by: EMERGENCY MEDICINE

## 2025-04-08 PROCEDURE — 80053 COMPREHEN METABOLIC PANEL: CPT | Performed by: EMERGENCY MEDICINE

## 2025-04-08 PROCEDURE — 25510000002 GADOBENATE DIMEGLUMINE 529 MG/ML SOLUTION: Performed by: HOSPITALIST

## 2025-04-08 PROCEDURE — 71045 X-RAY EXAM CHEST 1 VIEW: CPT

## 2025-04-08 PROCEDURE — A9577 INJ MULTIHANCE: HCPCS | Performed by: HOSPITALIST

## 2025-04-08 PROCEDURE — 25010000002 LABETALOL 5 MG/ML SOLUTION: Performed by: EMERGENCY MEDICINE

## 2025-04-08 PROCEDURE — 70551 MRI BRAIN STEM W/O DYE: CPT

## 2025-04-08 PROCEDURE — 70450 CT HEAD/BRAIN W/O DYE: CPT

## 2025-04-08 PROCEDURE — 93010 ELECTROCARDIOGRAM REPORT: CPT | Performed by: INTERNAL MEDICINE

## 2025-04-08 PROCEDURE — 70549 MR ANGIOGRAPH NECK W/O&W/DYE: CPT

## 2025-04-08 PROCEDURE — 70544 MR ANGIOGRAPHY HEAD W/O DYE: CPT

## 2025-04-08 PROCEDURE — 99285 EMERGENCY DEPT VISIT HI MDM: CPT | Performed by: EMERGENCY MEDICINE

## 2025-04-08 PROCEDURE — 99222 1ST HOSP IP/OBS MODERATE 55: CPT | Performed by: HOSPITALIST

## 2025-04-08 PROCEDURE — 84484 ASSAY OF TROPONIN QUANT: CPT | Performed by: EMERGENCY MEDICINE

## 2025-04-08 PROCEDURE — 85025 COMPLETE CBC W/AUTO DIFF WBC: CPT | Performed by: EMERGENCY MEDICINE

## 2025-04-08 PROCEDURE — 96374 THER/PROPH/DIAG INJ IV PUSH: CPT

## 2025-04-08 PROCEDURE — 80061 LIPID PANEL: CPT | Performed by: EMERGENCY MEDICINE

## 2025-04-08 RX ORDER — SODIUM CHLORIDE 9 MG/ML
40 INJECTION, SOLUTION INTRAVENOUS AS NEEDED
Status: DISCONTINUED | OUTPATIENT
Start: 2025-04-08 | End: 2025-04-09 | Stop reason: HOSPADM

## 2025-04-08 RX ORDER — LABETALOL HYDROCHLORIDE 5 MG/ML
10 INJECTION, SOLUTION INTRAVENOUS EVERY 4 HOURS PRN
Status: DISCONTINUED | OUTPATIENT
Start: 2025-04-08 | End: 2025-04-09 | Stop reason: HOSPADM

## 2025-04-08 RX ORDER — ATORVASTATIN CALCIUM 40 MG/1
80 TABLET, FILM COATED ORAL NIGHTLY
Status: DISCONTINUED | OUTPATIENT
Start: 2025-04-08 | End: 2025-04-09

## 2025-04-08 RX ORDER — ASPIRIN 300 MG/1
300 SUPPOSITORY RECTAL ONCE
Status: COMPLETED | OUTPATIENT
Start: 2025-04-08 | End: 2025-04-08

## 2025-04-08 RX ORDER — SODIUM CHLORIDE 0.9 % (FLUSH) 0.9 %
10 SYRINGE (ML) INJECTION AS NEEDED
Status: DISCONTINUED | OUTPATIENT
Start: 2025-04-08 | End: 2025-04-09 | Stop reason: HOSPADM

## 2025-04-08 RX ORDER — ESCITALOPRAM OXALATE 10 MG/1
15 TABLET ORAL NIGHTLY
COMMUNITY

## 2025-04-08 RX ORDER — CLONIDINE HYDROCHLORIDE 0.1 MG/1
0.1 TABLET ORAL ONCE
Status: COMPLETED | OUTPATIENT
Start: 2025-04-08 | End: 2025-04-08

## 2025-04-08 RX ORDER — LABETALOL HYDROCHLORIDE 5 MG/ML
20 INJECTION, SOLUTION INTRAVENOUS ONCE
Status: COMPLETED | OUTPATIENT
Start: 2025-04-08 | End: 2025-04-08

## 2025-04-08 RX ORDER — SODIUM CHLORIDE 0.9 % (FLUSH) 0.9 %
10 SYRINGE (ML) INJECTION EVERY 12 HOURS SCHEDULED
Status: DISCONTINUED | OUTPATIENT
Start: 2025-04-08 | End: 2025-04-09 | Stop reason: HOSPADM

## 2025-04-08 RX ORDER — OMEGA-3 FATTY ACIDS/FISH OIL 300-1000MG
1000 CAPSULE ORAL DAILY
COMMUNITY

## 2025-04-08 RX ADMIN — Medication 10 ML: at 20:36

## 2025-04-08 RX ADMIN — ASPIRIN 300 MG: 300 SUPPOSITORY RECTAL at 18:34

## 2025-04-08 RX ADMIN — LABETALOL HYDROCHLORIDE 20 MG: 5 INJECTION, SOLUTION INTRAVENOUS at 14:16

## 2025-04-08 RX ADMIN — CLONIDINE HYDROCHLORIDE 0.1 MG: 0.1 TABLET ORAL at 14:19

## 2025-04-08 RX ADMIN — ATORVASTATIN CALCIUM 80 MG: 40 TABLET, FILM COATED ORAL at 20:47

## 2025-04-08 RX ADMIN — GADOBENATE DIMEGLUMINE 17 ML: 529 INJECTION, SOLUTION INTRAVENOUS at 15:46

## 2025-04-08 NOTE — PLAN OF CARE
Goal Outcome Evaluation:  Plan of Care Reviewed With: patient, spouse        Progress: improving  Outcome Evaluation: Pt admitted from ER, continues tele, SB, HR 45's-50's.  on room air. NIH 1. Denies pain, denies n/v/d.  NPO r/t failed bedside swallow, coughs after drinks.  Pt reports falls x2 at home this past week, falls precautions in place.  Family at bedside.

## 2025-04-08 NOTE — H&P
"[facility] HOSPITALIST     MoeCornelio joshi, FREEDOM    CHIEF COMPLAINT: Weakness    HISTORY OF PRESENT ILLNESS:    Ms. Jarrell is a pleasant 70-year-old female who was brought in this morning after an episode of her hands not working.  Episodes actually began Trey evening with an episode where she slid off of her bed and had a great deal difficulty getting back up.  She has a history of chronic knee pain as well as low back pain, and she felt like she was having difficulty getting back up because of her knee pain.  However later Trey evening, her granddaughter reports she had episodes where she would start coughing after she would try to swallow something.  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  She reports she had a great deal difficulties during the deposition and made some errors in her report.  She completed the deposition in and returned home.  She also reports that she had an episode where she fell again and was not able to get up until her  was able to come get her.  Again she felt this was due to her suspected arthritis of her knees and her back.  Granddaughter also points out that she thought she saw some facial asymmetry on Sunday evening.  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  She also reports that during in the fall, she felt like the walls seem to \"bounce\".  She denies vertigo.  She denies chest pain and palpitations.  She does describe a little bit of dizziness particularly on Sunday night.  She has had no nausea or vomiting.  She denies numbness in her extremities.      Past Medical History:   Diagnosis Date    Abdominal wall mass 03/2014    Abnormal mammogram 09/04/2018    Abnormal mammogram 06/2015    Anxiety     Colon polyps     Colon stricture 07/17/2019    Depression     Foot " deformity     GERD (gastroesophageal reflux disease)     Hyperlipidemia     Hypertension     Left breast mass 2015    OA (osteoarthritis)     Obesity     Post-menopausal     Vitamin B 12 deficiency      Past Surgical History:   Procedure Laterality Date    BILATERAL BREAST REDUCTION Bilateral     BREAST BIOPSY Left     CARDIAC CATHETERIZATION Right 2014    MILD NONOBSTRUCTIVE CAD, NORMAL LEFT VENTRICULAR SYSTOLIC FUNCTION, LEFT VENTRICULAR HYPERTROPHY, DR. LESTER HECK AT [facility]     SECTION N/A 1976     SECTION N/A 1979    CHOLECYSTECTOMY N/A 2014    LAPAROSCOPIC WITH CHOLANGIOGRAM, DR. TRAY NARVAEZ AT [facility]    COLONOSCOPY N/A 2003    GOOD PREP, NONSPECIFIC ILEITIS, SIGMOID DIVERTICULOSIS, DR. AURY SOTELO AT [facility]    COLONOSCOPY N/A 2019    SIGMOID STRICTURE, DIVERTICULOSIS, PELVIC ADHESIONS, DR. AURY SOTELO AT  [facility]    COLONOSCOPY W/ POLYPECTOMY N/A 2003    LARGE INTERNAL HEMORRHOIDS, 3 MM HYPERPLASTIC POLYP IN RECTUM, SCATTERED DIVERTICULOSIS, DR. ADOLFO ISLAS AT [facility]     ERCP N/A 01/10/2014    CHOLEDOCHOLITHIASIS WAS FOUND, COMPLETE REMOVAL BY BILIARY SPHINCTEROTOMY AND BALLOON EXTRACTION, DR. MARGA MARAVILLA AT [facility]    ERCP N/A 2014    PRIOR BILIARY SPHINCTEROTOMY APPEARED STENOSED, DR. MARGA MARAVILLA AT [facility]    ERCP WITH FOREIGN BODY REMOVAL N/A 2014    PRIOR BILIARY SPHINCTEROTOMY APPEARD OPEN, 1 VISIBLY OCCLUDED STENT FROM THE BILIARY TREE WAS IN THE MAJOR PAPILLA, STENT REMOVED FROM BILIARY TREE, DR. MARGA MARAVILLA AT [facility]    MOUTH SURGERY N/A 2018    REDUCTION MAMMAPLASTY       Family History   Problem Relation Age of Onset    COPD Mother     Emphysema Mother     Heart disease Mother     Heart disease Father     Kidney disease Father     Vision loss Father     Cancer Father     Diabetes Father     Lymphoma Father     Stroke Brother     Hypertension Brother     Coronary artery disease Brother         stent     Peripheral vascular disease Brother         stent in the carotid    Diabetes Brother     No Known Problems Daughter     Drug abuse Son     Breast cancer Maternal Aunt      Social History     Tobacco Use    Smoking status: Former     Current packs/day: 0.00     Average packs/day: 1 pack/day for 11.0 years (11.0 ttl pk-yrs)     Types: Cigarettes     Start date:      Quit date:      Years since quittin.2    Smokeless tobacco: Never   Vaping Use    Vaping status: Never Used   Substance Use Topics    Alcohol use: Yes     Comment: 0-1 per month    Drug use: No     Comment: quit marijuana 19yrs ago     Medications Prior to Admission   Medication Sig Dispense Refill Last Dose/Taking    diazePAM (VALIUM) 5 MG tablet Take 1 tablet by mouth At Night As Needed for Anxiety or Sleep. 20 tablet 0 Past Week    diphenhydrAMINE-acetaminophen (TYLENOL PM)  MG tablet per tablet Take 1 tablet by mouth At Night As Needed for Sleep.   2025 at 10:00 PM    escitalopram (LEXAPRO) 10 MG tablet Take 1.5 tablets by mouth Every Night.   2025 at 10:00 PM    hydroCHLOROthiazide (HYDRODIURIL) 25 MG tablet Take 1 tablet by mouth Daily. Indications: Edema 90 tablet 3 2025 at 10:00 AM    hydrocortisone 2.5 % ointment Apply 1 application topically to the appropriate area as directed 2 (Two) Times a Day. 30 g 0 Past Month    ibuprofen (ADVIL,MOTRIN) 800 MG tablet Take 1 tablet by mouth Every 8 (Eight) Hours As Needed for Mild Pain. 90 tablet 0 Past Week    magnesium gluconate (MAGONATE) 500 MG tablet Take 1 tablet by mouth 2 (Two) Times a Day. Pt taking once a day   2025 at 10:00 PM    melatonin 5 MG tablet tablet Take 2 tablets by mouth Every Night.   2025 at 10:00 PM    metoprolol succinate XL (TOPROL-XL) 100 MG 24 hr tablet TAKE 1 TABLET BY MOUTH ONCE DAILY AT NIGHT AT BEDTIME FOR HIGH BLOOD PRESSURE (Patient taking differently: Take 1 tablet by mouth Every Night.) 90 tablet 3 2025 at 10:00 PM    Omega 3  "1000 MG capsule Take 1,000 mg by mouth Daily.   Past Week at 10:00 AM    Turmeric (QC Tumeric Complex) 500 MG capsule Take 2 capsules by mouth Daily With Breakfast. Indications: Inflammation   Past Week at 10:00 AM    valsartan (DIOVAN) 320 MG tablet Take 1 tablet by mouth Daily. Indications: High Blood Pressure Disorder 90 tablet 1 4/7/2025 at  4:00 PM    Vitamin D, Cholecalciferol, 1000 units capsule Take 5 capsules by mouth Daily.   4/6/2025     Allergies:  Dextromethorphan, Elavil [amitriptyline hcl], and Norvasc [amlodipine besylate]    REVIEW OF SYSTEMS:  Please see the above history of present illness for pertinent positives and negatives.  The remainder of the patient's systems have been reviewed and are negative.    Vital Signs  Temp:  [97.1 °F (36.2 °C)-98 °F (36.7 °C)] 97.5 °F (36.4 °C)  Heart Rate:  [46-53] 51  Resp:  [16-18] 18  BP: (152-214)/(68-83) 154/68  Oxygen Therapy  SpO2: 94 %  Pulse Oximetry Type: Continuous  Device (Oxygen Therapy): room air}  Body mass index is 36.73 kg/m².    Flowsheet Rows      Flowsheet Row First Filed Value   Admission Height 157.5 cm (62\") Documented at 04/08/2025 1255   Admission Weight 86.2 kg (190 lb) Documented at 04/08/2025 1255               Physical Exam:  Physical Exam   Constitutional: Patient appears well developed and well nourished and in no acute distress   HEENT:   Head: Normocephalic and atraumatic.   Eyes:  Pupils are equal, round, and EOM are intact. Sclerae are anicteric and noninjected.  Mouth and Throat: Patient has moist mucous membranes. Oropharynx is clear of any erythema or exudate.     Neck: Neck supple. No JVD present. No thyromegaly present. No lymphadenopathy present.  Cardiovascular: Regular rate, regular rhythm, S1 normal and S2 normal.  Exam reveals no gallop and no friction rub.  No murmur heard.  No bruits auscultated.  Radial and pedal pulses are 2+ and symmetric.  Pulmonary/Chest: Lungs are clear to auscultation bilaterally. No " respiratory distress. No wheezes. No rhonchi. No rales.   Abdominal: Obese. Soft. Bowel sounds are normal. There is no tenderness.   Musculoskeletal: Normal muscle tone  Extremities: No edema.   Neurological: Patient is alert and oriented to person, place, and time.  No facial asymmetry appreciated but she does have slight dysarthria..  Skin: Skin is warm. No rash noted. Nails show no clubbing.  No cyanosis or erythema.    Emotional Behavior:    Judgment and Insight: Good   Mental Status:  Alertness  Normal   Memory:  Appears intact   Mood and Affect:         Depression  None               Anxiety  None    Debilities:   Physical Weakness  As per HPI   Handicaps  None   Disabilities  None   Agitation  None     Results Review:    I reviewed the patient's new clinical results.  Lab Results (most recent)       Procedure Component Value Units Date/Time    POC Glucose Once [[id number]]  (Normal) Collected: 04/08/25 1724    Specimen: Blood Updated: 04/08/25 1734     Glucose 96 mg/dL     Hemoglobin A1c [[id number]]  (Abnormal) Collected: 04/08/25 1307    Specimen: Blood Updated: 04/08/25 1712     Hemoglobin A1C 6.05 %     Narrative:      Hemoglobin A1C Ranges:    Increased Risk for Diabetes  5.7% to 6.4%  Diabetes                     >= 6.5%  Diabetic Goal                < 7.0%    Lipid Panel [[id number]]  (Abnormal) Collected: 04/08/25 1307    Specimen: Blood Updated: 04/08/25 1532     Total Cholesterol 421 mg/dL      Triglycerides 210 mg/dL      HDL Cholesterol 57 mg/dL      LDL Cholesterol  318 mg/dL      VLDL Cholesterol 46 mg/dL      LDL/HDL Ratio 5.65    Narrative:      Cholesterol Reference Ranges  (U.S. Department of Health and Human Services ATP III Classifications)    Desirable          <200 mg/dL  Borderline High    200-239 mg/dL  High Risk          >240 mg/dL      Triglyceride Reference Ranges  (U.S. Department of Health and Human Services ATP III Classifications)    Normal           <150 mg/dL  Borderline High   150-199 mg/dL  High             200-499 mg/dL  Very High        >500 mg/dL    HDL Reference Ranges  (U.S. Department of Health and Human Services ATP III Classifications)    Low     <40 mg/dl (major risk factor for CHD)  High    >60 mg/dl ('negative' risk factor for CHD)        LDL Reference Ranges  (U.S. Department of Health and Human Services ATP III Classifications)    Optimal          <100 mg/dL  Near Optimal     100-129 mg/dL  Borderline High  130-159 mg/dL  High             160-189 mg/dL  Very High        >189 mg/dL    LDL is calculated using the NIH LDL-C calculation.      Urinalysis, Microscopic Only - Urine, Clean Catch [[id number]]  (Abnormal) Collected: 04/08/25 1428    Specimen: Urine, Clean Catch Updated: 04/08/25 1526     RBC, UA 3-5 /HPF      WBC, UA None Seen /HPF      Bacteria, UA Trace /HPF      Squamous Epithelial Cells, UA 13-20 /HPF      Hyaline Casts, UA 0-2 /LPF      Mucus, UA Small/1+ /HPF      Methodology Manual Light Microscopy    High Sensitivity Troponin T 1Hr [[id number]]  (Normal) Collected: 04/08/25 1417    Specimen: Blood Updated: 04/08/25 1509     HS Troponin T 7 ng/L      Troponin T Numeric Delta -1 ng/L     Narrative:      High Sensitive Troponin T Reference Range:  <14.0 ng/L- Negative Female for AMI  <22.0 ng/L- Negative Male for AMI  >=14 - Abnormal Female indicating possible myocardial injury.  >=22 - Abnormal Male indicating possible myocardial injury.   Clinicians would have to utilize clinical acumen, EKG, Troponin, and serial changes to determine if it is an Acute Myocardial Infarction or myocardial injury due to an underlying chronic condition.         Urinalysis With Microscopic If Indicated (No Culture) - Urine, Clean Catch [[id number]]  (Abnormal) Collected: 04/08/25 1428    Specimen: Urine, Clean Catch Updated: 04/08/25 1450     Color, UA Yellow     Appearance, UA Clear     pH, UA 6.0     Specific Gravity, UA 1.040     Comment: Result obtained by Refractometer         Glucose, UA Negative     Ketones, UA Negative     Bilirubin, UA Negative     Blood, UA Trace     Protein,  mg/dL (2+)     Leuk Esterase, UA Negative     Nitrite, UA Negative     Urobilinogen, UA 2.0 E.U./dL    Comprehensive Metabolic Panel [[id number]]  (Abnormal) Collected: 04/08/25 1307    Specimen: Blood Updated: 04/08/25 1345     Glucose 115 mg/dL      BUN 22 mg/dL      Creatinine 0.92 mg/dL      Sodium 143 mmol/L      Potassium 4.3 mmol/L      Comment: Slight hemolysis detected by analyzer. Result may be falsely elevated.        Chloride 105 mmol/L      CO2 24.4 mmol/L      Calcium 9.7 mg/dL      Total Protein 6.6 g/dL      Albumin 4.4 g/dL      ALT (SGPT) 24 U/L      AST (SGOT) 27 U/L      Comment: Slight hemolysis detected by analyzer. Result may be falsely elevated.        Alkaline Phosphatase 65 U/L      Total Bilirubin 0.3 mg/dL      Globulin 2.2 gm/dL      A/G Ratio 2.0 g/dL      BUN/Creatinine Ratio 23.9     Anion Gap 13.6 mmol/L      eGFR 66.7 mL/min/1.73     Narrative:      GFR Categories in Chronic Kidney Disease (CKD)      GFR Category          GFR (mL/min/1.73)    Interpretation  G1                     90 or greater         Normal or high (1)  G2                      60-89                Mild decrease (1)  G3a                   45-59                Mild to moderate decrease  G3b                   30-44                Moderate to severe decrease  G4                    15-29                Severe decrease  G5                    14 or less           Kidney failure          (1)In the absence of evidence of kidney disease, neither GFR category G1 or G2 fulfill the criteria for CKD.    eGFR calculation 2021 CKD-EPI creatinine equation, which does not include race as a factor    Magnesium [[id number]]  (Normal) Collected: 04/08/25 1307    Specimen: Blood Updated: 04/08/25 1342     Magnesium 2.0 mg/dL     High Sensitivity Troponin T [[id number]]  (Normal) Collected: 04/08/25 1307    Specimen: Blood  Updated: 04/08/25 1334     HS Troponin T 8 ng/L     Narrative:      High Sensitive Troponin T Reference Range:  <14.0 ng/L- Negative Female for AMI  <22.0 ng/L- Negative Male for AMI  >=14 - Abnormal Female indicating possible myocardial injury.  >=22 - Abnormal Male indicating possible myocardial injury.   Clinicians would have to utilize clinical acumen, EKG, Troponin, and serial changes to determine if it is an Acute Myocardial Infarction or myocardial injury due to an underlying chronic condition.         [facility] [[id number]] Collected: 04/08/25 1307    Specimen: Blood Updated: 04/08/25 1315    Narrative:      The following orders were created for panel order [facility].  Procedure                               Abnormality         Status                     ---------                               -----------         ------                     Green Top (Gel)[[id number]]                                  Final result               Lavender Top[[id number]]                                     Final result               Gold Top - SST[[id number]]                                   Final result               Light Blue Top[[id number]]                                   Final result                 Please view results for these tests on the individual orders.    Green Top (Gel) [[id number]] Collected: 04/08/25 1307    Specimen: Blood Updated: 04/08/25 1315     Extra Tube Hold for add-ons.     Comment: Auto resulted.       Lavender Top [[id number]] Collected: 04/08/25 1307    Specimen: Blood Updated: 04/08/25 1315     Extra Tube hold for add-on     Comment: Auto resulted       Gold Top - SST [[id number]] Collected: 04/08/25 1307    Specimen: Blood Updated: 04/08/25 1315     Extra Tube Hold for add-ons.     Comment: Auto resulted.       Light Blue Top [[id number]] Collected: 04/08/25 1307    Specimen: Blood Updated: 04/08/25 1315     Extra Tube Hold for add-ons.     Comment: Auto resulted       CBC & Differential [[id number]]   (Normal) Collected: 04/08/25 1307    Specimen: Blood Updated: 04/08/25 1313    Narrative:      The following orders were created for panel order CBC & Differential.  Procedure                               Abnormality         Status                     ---------                               -----------         ------                     CBC Auto Differential[[id number]]        Normal              Final result                 Please view results for these tests on the individual orders.    CBC Auto Differential [[id number]]  (Normal) Collected: 04/08/25 1307    Specimen: Blood Updated: 04/08/25 1313     WBC 7.33 10*3/mm3      RBC 4.90 10*6/mm3      Hemoglobin 14.6 g/dL      Hematocrit 43.6 %      MCV 89.0 fL      MCH 29.8 pg      MCHC 33.5 g/dL      RDW 13.4 %      RDW-SD 42.9 fl      MPV 11.4 fL      Platelets 186 10*3/mm3      Neutrophil % 62.1 %      Lymphocyte % 25.5 %      Monocyte % 9.4 %      Eosinophil % 2.0 %      Basophil % 0.7 %      Immature Grans % 0.3 %      Neutrophils, Absolute 4.55 10*3/mm3      Lymphocytes, Absolute 1.87 10*3/mm3      Monocytes, Absolute 0.69 10*3/mm3      Eosinophils, Absolute 0.15 10*3/mm3      Basophils, Absolute 0.05 10*3/mm3      Immature Grans, Absolute 0.02 10*3/mm3      nRBC 0.0 /100 WBC     POC Glucose Once [[id number]]  (Normal) Collected: 04/08/25 1242    Specimen: Blood Updated: 04/08/25 1248     Glucose 122 mg/dL             Imaging Results (Most Recent)       Procedure Component Value Units Date/Time    MRI Angiogram Neck With & Without Contrast [[id number]] Collected: 04/08/25 1610     Updated: 04/08/25 1621    Narrative:      MRI ANGIOGRAM NECK W WO CONTRAST    Date of Exam: 4/8/2025 2:48 PM EDT    Indication: dizzy, coordination diff with hands.     Comparison: None available.    Technique:  Routine 3-D time-of-flight gradient echo imaging was obtained of the neck before and after the uneventful administration of Multihance.      Findings:  Aortic arch: Conventional,  three-vessel, aortic arch.    Subclavian arteries: No flow-limiting stenosis. No occlusion.    Carotid arteries: No flow-limiting stenosis. No occlusion. Approximately 50% stenosis of the right internal carotid artery by NASCET criteria at its origin. Less than 50% narrowing of the left internal carotid artery by NASCET criteria at its origin.    Vertebral arteries: No flow-limiting stenosis. No occlusion.    Included intracranial arteries: No flow-limiting stenosis. No occlusion.      Impression:      Impression:   No large vessel occlusion or severe stenosis of the major arteries of the neck.    Approximately 50% stenosis of the right internal carotid artery at its origin.      Electronically Signed: Terence Carlson    4/8/2025 4:18 PM EDT    Workstation ID: [id number]    MRI Angiogram Head Without Contrast [[id number]] Collected: 04/08/25 1541     Updated: 04/08/25 1558    Narrative:      MRI ANGIOGRAM HEAD WO CONTRAST    Date of Exam: 4/8/2025 2:47 PM EDT    Indication: dizzy, coordination diff with hands.     Comparison: MRI brain from today    Technique:  Routine 3-D time-of-flight gradient echo imaging was obtained of the head without contrast administration.      Findings:  The bilateral intracranial internal carotid, bilateral middle cerebral, bilateral anterior cerebral, and anterior communicating arteries are widely patent. The bilateral intracranial vertebral, basilar, and bilateral posterior cerebral arteries are   widely patent. No definite posterior communicating artery is identified on either side. No intracranial aneurysm is identified. The visualized portions of the bilateral superior cerebellar, anterior inferior cerebellar, and posterior inferior cerebellar   arteries are unremarkable.      Impression:      Impression:  Major arterial vasculature within head appears widely patent, with no thrombus or aneurysm.        Electronically Signed: Prosper Acuna MD    4/8/2025 3:55 PM EDT    Workstation  ID: [id number]    MRI Brain Without Contrast [[id number]] Collected: 04/08/25 1541     Updated: 04/08/25 1551    Narrative:      MRI BRAIN WO CONTRAST    Date of Exam: 4/8/2025 2:44 PM EDT    Indication: dizzy, coordination diff with hands.     Comparison: None available.    Technique:  Routine multiplanar/multisequence sequence images of the brain were obtained without contrast administration.      Findings:  Acute infarct involving the left aspect of the last. Acute infarct in the right basal ganglia.    Chronic lacunar infarcts in the bilateral basal ganglia.    No acute midline shift, extra axial fluid collection, or hydrocephalus.    No subacute to old hemorrhage.    Mild generalized parenchymal volume loss.    Scattered areas of T2/FLAIR hyperintensity in the subcortical and periventricular white matter, nonspecific, perhaps from small vessel ischemic/hypertensive changes in a patient of this age.    Appropriate flow voids at the skull base and in the major venous sinuses.    Mild mucosal changes in the paranasal sinuses. Mastoid air cells are essentially clear.    Visualized globes and orbits appear unremarkable by MRI.    No acute or aggressive appearing bone or extracranial soft tissue process.        Impression:      Impression:  Acute pontine and right basal ganglia infarcts.    Critical Findings were verbally communicated with Dr. London By Terence Carlson MD on 4/8/2025 at 3:49 p.m.    Electronically Signed: Terence Carlson    4/8/2025 3:48 PM EDT    Workstation ID: [id number]    CT Head Without Contrast [[id number]] Collected: 04/08/25 1348     Updated: 04/08/25 1408    Narrative:      CT HEAD WO CONTRAST    Date of Exam: 4/8/2025 1:20 PM EDT    Indication: weakness.    Comparison: None available.    Technique: Axial CT images were obtained of the head without contrast administration.  Coronal reconstructions were performed.  Automated exposure control and iterative reconstruction methods were  used.      Findings:  Areas of hypoattenuation are seen within both the right and left basal ganglia consistent with areas of lacunar infarct, appearing likely chronic on the left and potentially somewhat more recent, likely subacute on the right. There is no evidence of   associated hemorrhage or mass effect. Gray-white differentiation is otherwise maintained. There is no evidence of hemorrhage, mass or mass effect otherwise. The ventricles are normal in size and configuration. The orbits are normal. The paranasal sinuses   appear clear.      Impression:      Impression:  Focal areas of hypoattenuation are present within the basal ganglia bilaterally, appearance consistent with a chronic lacunar infarct on the left and potentially somewhat more recent, likely subacute infarct on the right. There is no evidence of   associated hemorrhage or mass effect.    Findings relayed to [place] by [facility] via the epic secure messaging system at 1:59 p.m. 4/8/2025      Electronically Signed: Chao Newton MD    4/8/2025 2:05 PM EDT    Workstation ID: [id number]    XR Chest 1 View [[id number]] Collected: 04/08/25 1334     Updated: 04/08/25 1346    Narrative:      XR CHEST 1 VW    Date of Exam: 4/8/2025 1:22 PM EDT    Indication: Weak/Dizzy/AMS triage protocol    Comparison: 2/22/2020    FINDINGS:  No new consolidations or pleural effusions are observed. The cardiac silhouette and mediastinum are stable. No acute osseous abnormalities are identified.      Impression:      There is no significant change when compared to the prior study. There is no evidence for acute cardiopulmonary process.        Electronically Signed: Tad Little MD    4/8/2025 1:34 PM EDT    Workstation ID: [id number]              ECG/EMG Results (most recent)       Procedure Component Value Units Date/Time    ECG 12 Lead Rhythm Change [[id number]] Collected: 04/08/25 1306     Updated: 04/08/25 1719     QT Interval 457 ms      QTC Interval 390 ms      Narrative:      HEART RATE=44  bpm  RR Viqmnvre=3578  ms  LA Taafquzd=986  ms  P Horizontal Axis=13  deg  P Front Axis=42  deg  QRSD Interval=95  ms  QT Kyhdwevo=980  ms  MYyL=643  ms  QRS Axis=18  deg  T Wave Axis=  deg  - ABNORMAL ECG -  Sinus bradycardia  LVH with secondary repolarization abnormality  No change from prior tracing  Electronically Signed By: Stefanie Appiah ([facility]) 2025-04-08 17:19:50  Date and Time of Study:2025-04-08 13:06:15              Assessment & Plan     Acute CVA: Event was likely on Sunday as the CT was positive on presentation to the ER.  Her risk factors include hypertension as well as her age.  She quit smoking approximately 19 years ago but smoked for about 15 years prior to that.  LDL was not at goal.  A1c is concerning as well.  She has been started on rectal aspirin.  Will have speech follow her as well as there is still some question of mild dysphagia by nursing staff.  Neurology will also see in the morning.  Complete echo in the morning.  Allow for permissive hypertension this evening.    I discussed the patient's findings and my recommendations with patient and  w/ permission.     Sumanth Milian MD  04/08/25  18:33 EDT

## 2025-04-08 NOTE — ED PROVIDER NOTES
Subjective   History of Present Illness  Patient presents complaining of a 4-day history of various symptoms.  Patient says initially 4 days ago she had been eating and felt like she was having some trouble swallowing.  Patient did not know if it was just her mouth was dry or if she was having difficulty getting the food to go down.  Since then she has had varying other neurologic symptoms such as brief episodes of dizziness and generalized clumsiness with her hands.  A few days ago she said she tried to reach up and touch her face but her hand just was not make it there.  She said it was more of a spatial thing where she thought she was to her face but she was not.  She was able to reach her face and she could move her hand without any weakness.  She did fall yesterday but denies any major injury.  When she was at work yesterday being the , she said that she felt like her hands were not typing correctly.  More on the right than the left but they were both having issues as well as intermittent tingling in both hands.  Patient says that she is not having any numbness or weakness and can still use her hands as needed without difficulty.  Patient is denying any headache, vision changes, ringing in her ears and no difficulty speaking or walking.      Review of Systems   All other systems reviewed and are negative.      Past Medical History:   Diagnosis Date    Abdominal wall mass 03/2014    Abnormal mammogram 09/04/2018    Abnormal mammogram 06/2015    Anxiety     Colon polyps     Colon stricture 07/17/2019    Depression     Foot deformity     GERD (gastroesophageal reflux disease)     Hyperlipidemia     Hypertension     Left breast mass 06/2015    OA (osteoarthritis)     Obesity     Post-menopausal     Vitamin B 12 deficiency        Allergies   Allergen Reactions    Dextromethorphan Unknown - Low Severity    Elavil [Amitriptyline Hcl] Dizziness    Norvasc [Amlodipine Besylate] Swelling     Ankles swelling        Past Surgical History:   Procedure Laterality Date    BILATERAL BREAST REDUCTION Bilateral     BREAST BIOPSY Left     CARDIAC CATHETERIZATION Right 2014    MILD NONOBSTRUCTIVE CAD, NORMAL LEFT VENTRICULAR SYSTOLIC FUNCTION, LEFT VENTRICULAR HYPERTROPHY, DR. LESTER HECK AT [facility]     SECTION N/A 1976     SECTION N/A 1979    CHOLECYSTECTOMY N/A 2014    LAPAROSCOPIC WITH CHOLANGIOGRAM, DR. TRAY NARVAEZ AT [facility]    COLONOSCOPY N/A 2003    GOOD PREP, NONSPECIFIC ILEITIS, SIGMOID DIVERTICULOSIS, DR. AURY SOTELO AT [facility]    COLONOSCOPY N/A 2019    SIGMOID STRICTURE, DIVERTICULOSIS, PELVIC ADHESIONS, DR. AURY SOTELO AT  [facility]    COLONOSCOPY W/ POLYPECTOMY N/A 2003    LARGE INTERNAL HEMORRHOIDS, 3 MM HYPERPLASTIC POLYP IN RECTUM, SCATTERED DIVERTICULOSIS, DR. ADOLFO ISLAS AT [facility]     ERCP N/A 01/10/2014    CHOLEDOCHOLITHIASIS WAS FOUND, COMPLETE REMOVAL BY BILIARY SPHINCTEROTOMY AND BALLOON EXTRACTION, DR. MARGA MARAVILLA AT [facility]    ERCP N/A 2014    PRIOR BILIARY SPHINCTEROTOMY APPEARED STENOSED, DR. MARGA MARAVILLA AT [facility]    ERCP WITH FOREIGN BODY REMOVAL N/A 2014    PRIOR BILIARY SPHINCTEROTOMY APPEARD OPEN, 1 VISIBLY OCCLUDED STENT FROM THE BILIARY TREE WAS IN THE MAJOR PAPILLA, STENT REMOVED FROM BILIARY TREE, DR. MARGA MARAVILLA AT [facility]    MOUTH SURGERY N/A 2018    REDUCTION MAMMAPLASTY         Family History   Problem Relation Age of Onset    COPD Mother     Emphysema Mother     Heart disease Mother     Heart disease Father     Kidney disease Father     Vision loss Father     Cancer Father     Diabetes Father     Lymphoma Father     Stroke Brother     Hypertension Brother     Coronary artery disease Brother         stent    Peripheral vascular disease Brother         stent in the carotid    Diabetes Brother     No Known Problems Daughter     Drug abuse Son     Breast cancer Maternal Aunt        Social History  "    Socioeconomic History    Marital status:      Spouse name: Venu\"Miguel\"    Number of children: 2    Years of education: College degree   Tobacco Use    Smoking status: Former     Current packs/day: 0.00     Average packs/day: 1 pack/day for 11.0 years (11.0 ttl pk-yrs)     Types: Cigarettes     Start date:      Quit date:      Years since quittin.2    Smokeless tobacco: Never   Vaping Use    Vaping status: Never Used   Substance and Sexual Activity    Alcohol use: Yes     Comment: 0-1 per month    Drug use: No     Comment: quit marijuana 19yrs ago    Sexual activity: Yes     Partners: Male     Birth control/protection: Post-menopausal           Objective   Physical Exam  Vitals and nursing note reviewed.   Constitutional:       Comments: Patient sitting in bed comfortably, talkative, friendly, no signs of distress.  Cooperative with exam.   HENT:      Head: Normocephalic and atraumatic.      Right Ear: External ear normal.      Left Ear: External ear normal.      Nose: Nose normal.      Mouth/Throat:      Pharynx: Oropharynx is clear.   Eyes:      Extraocular Movements: Extraocular movements intact.      Conjunctiva/sclera: Conjunctivae normal.      Pupils: Pupils are equal, round, and reactive to light.   Cardiovascular:      Rate and Rhythm: Normal rate and regular rhythm.      Pulses:           Radial pulses are 2+ on the right side and 2+ on the left side.      Heart sounds: S1 normal and S2 normal.   Pulmonary:      Effort: Pulmonary effort is normal.      Breath sounds: Normal breath sounds.   Musculoskeletal:         General: No swelling or tenderness.      Cervical back: Neck supple.      Right lower leg: No edema.      Left lower leg: No edema.   Skin:     General: Skin is warm and dry.      Capillary Refill: Capillary refill takes 2 to 3 seconds.   Neurological:      Mental Status: She is alert and oriented to person, place, and time.      Cranial Nerves: Cranial nerves 2-12 are " intact. No cranial nerve deficit.      Sensory: No sensory deficit.      Motor: No weakness.      Coordination: Finger-Nose-Finger Test normal.      Gait: Gait is intact.   Psychiatric:         Mood and Affect: Mood normal.         Behavior: Behavior normal.         Procedures           ED Course  ED Course as of 04/09/25 1326   Tue Apr 08, 2025   1300 NIHSS - 0 [AW]   1306 EKG -EKG-rate of 44, sinus bradycardia, normal axis, inverted T waves in leads V1, V2, V3, and V4 [AW]   1410 Discussed pt with telestroke, and he advised, reducing pt blood pressure, giving aspirin, and getting mri brain, mra brain without and mra neck with/without [AW]      ED Course User Index  [AW] Blade Gamez MD                                Total (NIH Stroke Scale): 0              DAT reviewed by Sumanth Milian MD, Blade Gamez MD       Medical Decision Making  Ddx CVA, hypertensive emergency, nerve compression, hypotension, dysrhythmia    Labs Reviewed  COMPREHENSIVE METABOLIC PANEL - Abnormal; Notable for the following components:     Glucose                       115 (*)             All other components within normal limits         Narrative: GFR Categories in Chronic Kidney Disease (CKD)                                      GFR Category          GFR (mL/min/1.73)    Interpretation                  G1                     90 or greater         Normal or high (1)                  G2                      60-89                Mild decrease (1)                  G3a                   45-59                Mild to moderate decrease                  G3b                   30-44                Moderate to severe decrease                  G4                    15-29                Severe decrease                  G5                    14 or less           Kidney failure                                          (1)In the absence of evidence of kidney disease, neither GFR category G1 or G2 fulfill the criteria for CKD.                                     eGFR calculation 2021 CKD-EPI creatinine equation, which does not include race as a factor  TROPONIN - Normal         Narrative: High Sensitive Troponin T Reference Range:                  <14.0 ng/L- Negative Female for AMI                  <22.0 ng/L- Negative Male for AMI                  >=14 - Abnormal Female indicating possible myocardial injury.                  >=22 - Abnormal Male indicating possible myocardial injury.                   Clinicians would have to utilize clinical acumen, EKG, Troponin, and serial changes to determine if it is an Acute Myocardial Infarction or myocardial injury due to an underlying chronic condition.                                       MAGNESIUM - Normal  CBC WITH AUTO DIFFERENTIAL - Normal  POCT GLUCOSE FINGERSTICK - Normal  RAINBOW DRAW         Narrative: The following orders were created for panel order Dana Draw.                  Procedure                               Abnormality         Status                                     ---------                               -----------         ------                                     Green Top (Gel)[[id number]]                                  Final result                               Lavender Top[[id number]]                                     Final result                               Gold Top - SST[[id number]]                                   Final result                               Light Blue Top[[id number]]                                   Final result                                                 Please view results for these tests on the individual orders.  URINALYSIS W/ MICROSCOPIC IF INDICATED (NO CULTURE)  HIGH SENSITIVITIY TROPONIN T 1HR  LIPID PANEL  POCT GLUCOSE FINGERSTICK  CBC AND DIFFERENTIAL         Narrative: The following orders were created for panel order CBC & Differential.                  Procedure                               Abnormality         Status                                      ---------                               -----------         ------                                     CBC Auto Differential[[id number]]        Normal              Final result                                                 Please view results for these tests on the individual orders.  GREEN TOP  LAVENDER TOP  GOLD TOP - SST  LIGHT BLUE TOP    MRI Angiogram Neck With & Without Contrast  Result Date: 4/8/2025  Impression: No large vessel occlusion or severe stenosis of the major arteries of the neck. Approximately 50% stenosis of the right internal carotid artery at its origin. Electronically Signed: Terence Carlson  4/8/2025 4:18 PM EDT  Workstation ID: [id number]    MRI Angiogram Head Without Contrast  Result Date: 4/8/2025  Impression: Major arterial vasculature within head appears widely patent, with no thrombus or aneurysm. Electronically Signed: Prosper Acuna MD  4/8/2025 3:55 PM EDT  Workstation ID: [id number]    MRI Brain Without Contrast  Result Date: 4/8/2025  Impression: Acute pontine and right basal ganglia infarcts. Critical Findings were verbally communicated with Dr. London By Terence Carlson MD on 4/8/2025 at 3:49 p.m. Electronically Signed: Terence Carlson  4/8/2025 3:48 PM EDT  Workstation ID: [id number]    CT Head Without Contrast  Result Date: 4/8/2025  Impression: Focal areas of hypoattenuation are present within the basal ganglia bilaterally, appearance consistent with a chronic lacunar infarct on the left and potentially somewhat more recent, likely subacute infarct on the right. There is no evidence of associated hemorrhage or mass effect. Findings relayed to [facility] by [facility] via the epic secure messaging system at 1:59 p.m. 4/8/2025 Electronically Signed: Chao Newton MD  4/8/2025 2:05 PM EDT  Workstation ID: [id number]    XR Chest 1 View  Result Date: 4/8/2025  There is no significant change when compared to the prior study. There is no evidence for acute cardiopulmonary  process. Electronically Signed: Tad Little MD  4/8/2025 1:34 PM EDT  Workstation ID: [id number]            Problems Addressed:  At risk for obstructive sleep apnea: complicated acute illness or injury  Bradycardia, unspecified: complicated acute illness or injury  Cerebrovascular accident (CVA), unspecified mechanism: complicated acute illness or injury  Dizziness: complicated acute illness or injury  Hand paresthesia: complicated acute illness or injury    Amount and/or Complexity of Data Reviewed  Labs: ordered.  Radiology: ordered.  ECG/medicine tests: ordered.    Risk  Prescription drug management.  Decision regarding hospitalization.        Final diagnoses:   Cerebrovascular accident (CVA), unspecified mechanism   Dizziness   Hand paresthesia   Bradycardia, unspecified   At risk for obstructive sleep apnea       ED Disposition  ED Disposition       ED Disposition   Decision to Admit    Condition   --    Comment   Level of Care: Med/Surg [1]   Diagnosis: CVA (cerebral vascular accident) [841736]   Admitting Physician: ASHANTI MARINO [[id number]]   Attending Physician: ASHANTI MARINO [[id number]]                 Sona Malcolm, APRN  [address] [facility] [address] [phone]    Follow up in 3 month(s)      Corbin Vega MD  [address] [id number] [phone]               Medication List      No changes were made to your prescriptions during this visit.            Blade Gamez MD  04/09/25 5090

## 2025-04-09 ENCOUNTER — READMISSION MANAGEMENT (OUTPATIENT)
Dept: CALL CENTER | Facility: HOSPITAL | Age: 70
End: 2025-04-09
Payer: MEDICARE

## 2025-04-09 ENCOUNTER — APPOINTMENT (OUTPATIENT)
Dept: CARDIOLOGY | Facility: HOSPITAL | Age: 70
End: 2025-04-09
Payer: MEDICARE

## 2025-04-09 VITALS
OXYGEN SATURATION: 96 % | SYSTOLIC BLOOD PRESSURE: 136 MMHG | HEIGHT: 62 IN | BODY MASS INDEX: 36.8 KG/M2 | RESPIRATION RATE: 18 BRPM | HEART RATE: 57 BPM | TEMPERATURE: 97.5 F | DIASTOLIC BLOOD PRESSURE: 65 MMHG | WEIGHT: 200 LBS

## 2025-04-09 LAB
AORTIC DIMENSIONLESS INDEX: 0.7 (DI)
AV MEAN PRESS GRAD SYS DOP V1V2: 7 MMHG
AV VMAX SYS DOP: 185 CM/SEC
BH CV ECHO MEAS - AO MAX PG: 13.7 MMHG
BH CV ECHO MEAS - AO ROOT DIAM: 3.1 CM
BH CV ECHO MEAS - AO V2 VTI: 43.1 CM
BH CV ECHO MEAS - AVA(I,D): 2.26 CM2
BH CV ECHO MEAS - EDV(CUBED): 110.6 ML
BH CV ECHO MEAS - EDV(MOD-SP2): 128 ML
BH CV ECHO MEAS - EDV(MOD-SP4): 136 ML
BH CV ECHO MEAS - EF(MOD-SP2): 61.7 %
BH CV ECHO MEAS - EF(MOD-SP4): 58.8 %
BH CV ECHO MEAS - ESV(CUBED): 33.6 ML
BH CV ECHO MEAS - ESV(MOD-SP2): 49 ML
BH CV ECHO MEAS - ESV(MOD-SP4): 56 ML
BH CV ECHO MEAS - FS: 32.8 %
BH CV ECHO MEAS - IVS/LVPW: 0.85 CM
BH CV ECHO MEAS - IVSD: 1.1 CM
BH CV ECHO MEAS - LAT PEAK E' VEL: 5.7 CM/SEC
BH CV ECHO MEAS - LV DIASTOLIC VOL/BSA (35-75): 71.1 CM2
BH CV ECHO MEAS - LV MASS(C)D: 219.1 GRAMS
BH CV ECHO MEAS - LV MAX PG: 8.1 MMHG
BH CV ECHO MEAS - LV MEAN PG: 3 MMHG
BH CV ECHO MEAS - LV SYSTOLIC VOL/BSA (12-30): 29.3 CM2
BH CV ECHO MEAS - LV V1 MAX: 142 CM/SEC
BH CV ECHO MEAS - LV V1 VTI: 30.1 CM
BH CV ECHO MEAS - LVIDD: 4.8 CM
BH CV ECHO MEAS - LVIDS: 3.2 CM
BH CV ECHO MEAS - LVOT AREA: 3.2 CM2
BH CV ECHO MEAS - LVOT DIAM: 2.03 CM
BH CV ECHO MEAS - LVPWD: 1.3 CM
BH CV ECHO MEAS - MED PEAK E' VEL: 7.2 CM/SEC
BH CV ECHO MEAS - MR MAX PG: 70 MMHG
BH CV ECHO MEAS - MR MAX VEL: 418.4 CM/SEC
BH CV ECHO MEAS - MV A DUR: 0.08 SEC
BH CV ECHO MEAS - MV A MAX VEL: 63.4 CM/SEC
BH CV ECHO MEAS - MV DEC SLOPE: 333.7 CM/SEC2
BH CV ECHO MEAS - MV DEC TIME: 0.18 SEC
BH CV ECHO MEAS - MV E MAX VEL: 71.8 CM/SEC
BH CV ECHO MEAS - MV E/A: 1.13
BH CV ECHO MEAS - MV MAX PG: 3.5 MMHG
BH CV ECHO MEAS - MV MEAN PG: 1.09 MMHG
BH CV ECHO MEAS - MV P1/2T: 70.1 MSEC
BH CV ECHO MEAS - MV V2 VTI: 33.1 CM
BH CV ECHO MEAS - MVA(P1/2T): 3.1 CM2
BH CV ECHO MEAS - MVA(VTI): 3 CM2
BH CV ECHO MEAS - PA ACC TIME: 0.2 SEC
BH CV ECHO MEAS - PA V2 MAX: 132.9 CM/SEC
BH CV ECHO MEAS - PULM A REVS DUR: 0.11 SEC
BH CV ECHO MEAS - PULM A REVS VEL: 30 CM/SEC
BH CV ECHO MEAS - PULM DIAS VEL: 42.1 CM/SEC
BH CV ECHO MEAS - PULM S/D: 1.68
BH CV ECHO MEAS - PULM SYS VEL: 70.8 CM/SEC
BH CV ECHO MEAS - RAP SYSTOLE: 3 MMHG
BH CV ECHO MEAS - RV MAX PG: 2.44 MMHG
BH CV ECHO MEAS - RV V1 MAX: 78.1 CM/SEC
BH CV ECHO MEAS - RV V1 VTI: 19.8 CM
BH CV ECHO MEAS - RVSP: 9.2 MMHG
BH CV ECHO MEAS - SV(LVOT): 97.6 ML
BH CV ECHO MEAS - SV(MOD-SP2): 79 ML
BH CV ECHO MEAS - SV(MOD-SP4): 80 ML
BH CV ECHO MEAS - SVI(LVOT): 51 ML/M2
BH CV ECHO MEAS - SVI(MOD-SP2): 41.3 ML/M2
BH CV ECHO MEAS - SVI(MOD-SP4): 41.9 ML/M2
BH CV ECHO MEAS - TAPSE (>1.6): 1.97 CM
BH CV ECHO MEAS - TR MAX PG: 6.2 MMHG
BH CV ECHO MEAS - TR MAX VEL: 124.1 CM/SEC
BH CV ECHO MEASUREMENTS AVERAGE E/E' RATIO: 11.13
BH CV ECHO SHUNT ASSESSMENT PERFORMED (HIDDEN SCRIPTING): 1
BH CV XLRA - RV BASE: 3.6 CM
BH CV XLRA - TDI S': 15.1 CM/SEC
CHOLEST SERPL-MCNC: 399 MG/DL (ref 0–200)
FOLATE SERPL-MCNC: >20 NG/ML (ref 4.78–24.2)
GLUCOSE BLDC GLUCOMTR-MCNC: 120 MG/DL (ref 70–130)
HDLC SERPL-MCNC: 55 MG/DL (ref 40–60)
LDLC SERPL CALC-MCNC: 307 MG/DL (ref 0–100)
LDLC/HDLC SERPL: 5.6 {RATIO}
LEFT ATRIUM VOLUME INDEX: 26.4 ML/M2
LV EF BIPLANE MOD: 58.1 %
TRIGL SERPL-MCNC: 181 MG/DL (ref 0–150)
VIT B12 BLD-MCNC: 535 PG/ML (ref 211–946)
VLDLC SERPL-MCNC: 37 MG/DL (ref 5–40)

## 2025-04-09 PROCEDURE — 93306 TTE W/DOPPLER COMPLETE: CPT

## 2025-04-09 PROCEDURE — 80061 LIPID PANEL: CPT | Performed by: NURSE PRACTITIONER

## 2025-04-09 PROCEDURE — 99214 OFFICE O/P EST MOD 30 MIN: CPT | Performed by: NURSE PRACTITIONER

## 2025-04-09 PROCEDURE — 93306 TTE W/DOPPLER COMPLETE: CPT | Performed by: INTERNAL MEDICINE

## 2025-04-09 PROCEDURE — G0378 HOSPITAL OBSERVATION PER HR: HCPCS

## 2025-04-09 PROCEDURE — 82607 VITAMIN B-12: CPT | Performed by: NURSE PRACTITIONER

## 2025-04-09 PROCEDURE — 83921 ORGANIC ACID SINGLE QUANT: CPT | Performed by: NURSE PRACTITIONER

## 2025-04-09 PROCEDURE — 92523 SPEECH SOUND LANG COMPREHEN: CPT

## 2025-04-09 PROCEDURE — 97161 PT EVAL LOW COMPLEX 20 MIN: CPT

## 2025-04-09 PROCEDURE — 82746 ASSAY OF FOLIC ACID SERUM: CPT | Performed by: NURSE PRACTITIONER

## 2025-04-09 PROCEDURE — 97165 OT EVAL LOW COMPLEX 30 MIN: CPT

## 2025-04-09 PROCEDURE — 25510000001 PERFLUTREN 6.52 MG/ML SUSPENSION 2 ML VIAL: Performed by: HOSPITALIST

## 2025-04-09 PROCEDURE — 82948 REAGENT STRIP/BLOOD GLUCOSE: CPT

## 2025-04-09 PROCEDURE — 93246 EXT ECG>7D<15D RECORDING: CPT

## 2025-04-09 RX ORDER — ROSUVASTATIN CALCIUM 40 MG/1
40 TABLET, COATED ORAL DAILY
Qty: 90 TABLET | Refills: 0 | Status: SHIPPED | OUTPATIENT
Start: 2025-04-10

## 2025-04-09 RX ORDER — ASPIRIN 81 MG/1
81 TABLET ORAL DAILY
Status: DISCONTINUED | OUTPATIENT
Start: 2025-04-09 | End: 2025-04-09 | Stop reason: HOSPADM

## 2025-04-09 RX ORDER — ROSUVASTATIN CALCIUM 20 MG/1
40 TABLET, COATED ORAL DAILY
Status: DISCONTINUED | OUTPATIENT
Start: 2025-04-09 | End: 2025-04-09 | Stop reason: HOSPADM

## 2025-04-09 RX ORDER — CLOPIDOGREL BISULFATE 75 MG/1
75 TABLET ORAL DAILY
Qty: 20 TABLET | Refills: 0 | Status: SHIPPED | OUTPATIENT
Start: 2025-04-10 | End: 2025-04-30

## 2025-04-09 RX ORDER — ASPIRIN 81 MG/1
81 TABLET ORAL DAILY
Qty: 30 TABLET | Refills: 0 | Status: SHIPPED | OUTPATIENT
Start: 2025-04-10

## 2025-04-09 RX ORDER — CLOPIDOGREL BISULFATE 75 MG/1
75 TABLET ORAL DAILY
Status: DISCONTINUED | OUTPATIENT
Start: 2025-04-09 | End: 2025-04-09 | Stop reason: HOSPADM

## 2025-04-09 RX ADMIN — CLOPIDOGREL BISULFATE 75 MG: 75 TABLET ORAL at 12:05

## 2025-04-09 RX ADMIN — ASPIRIN 81 MG: 81 TABLET, COATED ORAL at 12:05

## 2025-04-09 RX ADMIN — SODIUM CHLORIDE 2 ML: 9 INJECTION INTRAMUSCULAR; INTRAVENOUS; SUBCUTANEOUS at 17:24

## 2025-04-09 RX ADMIN — ROSUVASTATIN 40 MG: 20 TABLET, FILM COATED ORAL at 12:05

## 2025-04-09 NOTE — DISCHARGE PLACEMENT REQUEST
"Car Zheng (70 y.o. Female)       Date of Birth   1955    Social Security Number       Address   [address]  [place] [address]    Home Phone   [phone]    MRN   [id number]       [employer]   [employer]    Marital Status                               Admission Date   4/8/2025    Admission Type   Emergency    Admitting Provider   Sumanth Milian MD    Attending Provider   Sumanth Milian MD    Department, Room/Bed   [facility] SURG, 1409/1       Discharge Date       Discharge Disposition       Discharge Destination                                 Attending Provider: Sumanth Milian MD    Allergies: Dextromethorphan, Elavil [Amitriptyline Hcl], Norvasc [Amlodipine Besylate]    Isolation: None   Infection: None   Code Status: CPR    Ht: 157.5 cm (62\")   Wt: 91.1 kg (200 lb 13.4 oz)    Admission Cmt: None   Principal Problem: CVA (cerebral vascular accident) [I63.9]                   Active Insurance as of 4/8/2025       Primary Coverage       Payor Plan Insurance Group Employer/Plan Group    MEDICARE MEDICARE A & B        Payor Plan Address Payor Plan Phone Number Payor Plan Fax Number Effective Dates    [address] [phone]  2/1/2019 - None Entered    [place] [address]         Subscriber Name Subscriber Birth Date Member ID       CAR ZHENG 1955 [id number]               Secondary Coverage       Payor Plan Insurance Group Employer/Plan Group    [employer] [place] [employer] [place] [employer] [place]       Payor Plan Address Payor Plan Phone Number Payor Plan Fax Number Effective Dates    [address] [place] [address] [phone]  2/1/2019 - None Entered    [place] [address]         Subscriber Name Subscriber Birth Date Member ID       CAR ZHENG 1955 [id number]                     Emergency Contacts        (Rel.) Home Phone Work Phone Mobile Phone    WeslyMiguel (Spouse) -- -- [phone]    Ksenai Saldaña (Daughter) -- -- [phone]    Joao Zheng (Son) -- -- " [phone]

## 2025-04-09 NOTE — OUTREACH NOTE
Prep Survey      Flowsheet Row Responses   [facility] facility patient discharged from? [place]   Is LACE score < 7 ? No   Eligibility Readm Mgmt   Discharge diagnosis CVA (cerebral vascular accident)   Does the patient have one of the following disease processes/diagnoses(primary or secondary)? Stroke   Does the patient have Home health ordered? Yes   [facility]   Prep survey completed? Yes            Rosa MICHAELS - Registered Nurse

## 2025-04-09 NOTE — THERAPY DISCHARGE NOTE
Patient Name: Nery Jarrell  : 1955    MRN: [id number]                              Today's Date: 2025       Admit Date: 2025    Visit Dx:     ICD-10-CM ICD-9-CM   1. Cerebrovascular accident (CVA), unspecified mechanism  I63.9 434.91   2. Dizziness  R42 780.4   3. Hand paresthesia  R20.2 782.0     Patient Active Problem List   Diagnosis    Anxiety and depression    Mixed hyperlipidemia    Essential hypertension    Class 1 obesity due to excess calories with serious comorbidity and body mass index (BMI) of 31.0 to 31.9 in adult    B12 deficiency    Avitaminosis D    Arthritis    Medicare annual wellness visit, subsequent    Screen for colon cancer    Encounter for screening mammogram for malignant neoplasm of breast    Menopause    Dermatitis    Lumbar radiculopathy    CVA (cerebral vascular accident)     Past Medical History:   Diagnosis Date    Abdominal wall mass 2014    Abnormal mammogram 2018    Abnormal mammogram 2015    Anxiety     Colon polyps     Colon stricture 2019    Depression     Foot deformity     GERD (gastroesophageal reflux disease)     Hyperlipidemia     Hypertension     Left breast mass 2015    OA (osteoarthritis)     Obesity     Post-menopausal     Vitamin B 12 deficiency      Past Surgical History:   Procedure Laterality Date    BILATERAL BREAST REDUCTION Bilateral     BREAST BIOPSY Left     CARDIAC CATHETERIZATION Right 2014    MILD NONOBSTRUCTIVE CAD, NORMAL LEFT VENTRICULAR SYSTOLIC FUNCTION, LEFT VENTRICULAR HYPERTROPHY, DR. LESTER HECK AT [facility]     SECTION N/A 1976     SECTION N/A 1979    CHOLECYSTECTOMY N/A 2014    LAPAROSCOPIC WITH CHOLANGIOGRAM, DR. TRAY NARVAEZ AT [facility]    COLONOSCOPY N/A 2003    GOOD PREP, NONSPECIFIC ILEITIS, SIGMOID DIVERTICULOSIS, DR. AURY SOTELO AT [facility]    COLONOSCOPY N/A 2019    SIGMOID STRICTURE, DIVERTICULOSIS, PELVIC ADHESIONS, DR. AURY SOTELO AT   "[facility]    COLONOSCOPY W/ POLYPECTOMY N/A 01/30/2003    LARGE INTERNAL HEMORRHOIDS, 3 MM HYPERPLASTIC POLYP IN RECTUM, SCATTERED DIVERTICULOSIS, DR. ADOLFO ISLAS AT [facility]     ERCP N/A 01/10/2014    CHOLEDOCHOLITHIASIS WAS FOUND, COMPLETE REMOVAL BY BILIARY SPHINCTEROTOMY AND BALLOON EXTRACTION, DR. MARGA MARAVILLA AT [facility]    ERCP N/A 01/12/2014    PRIOR BILIARY SPHINCTEROTOMY APPEARED STENOSED, DR. MARGA MARAVILLA AT [facility]    ERCP WITH FOREIGN BODY REMOVAL N/A 04/09/2014    PRIOR BILIARY SPHINCTEROTOMY APPEARD OPEN, 1 VISIBLY OCCLUDED STENT FROM THE BILIARY TREE WAS IN THE MAJOR PAPILLA, STENT REMOVED FROM BILIARY TREE, DR. MARGA MARAVILLA AT [facility]    MOUTH SURGERY N/A 08/31/2018    REDUCTION MAMMAPLASTY        General Information       Row Name 04/09/25 0805          Physical Therapy Time and Intention    Document Type discharge evaluation/summary  -     Mode of Treatment physical therapy  -       Row Name 04/09/25 0805          General Information    Patient Profile Reviewed yes  Pt c/o \"hands not working\" well since this past Sunday. She also slid out of bed and required assistance to get up and also had a fall on Monday.pt diagnosed with CVA of left last and right basal ganglia  -     Prior Level of Function independent:;all household mobility;community mobility  -     Existing Precautions/Restrictions fall  -     Barriers to Rehab none identified  -       Row Name 04/09/25 0805          Living Environment    Current Living Arrangements home  -     People in Home spouse;grandchild(fransisca)  -       Row Name 04/09/25 0805          Home Main Entrance    Number of Stairs, Main Entrance one  -     Stair Railings, Main Entrance none  -       Row Name 04/09/25 0805          Stairs Within Home, Primary    Stairs, Within Home, Primary 2 story house, bedroom on 2nd floor  -       Row Name 04/09/25 0805          Cognition    Orientation Status (Cognition) oriented to;person;place  requires cues and extended " time to answer month/year.  able to immediately recall at end of session  -               User Key  (r) = Recorded By, (t) = Taken By, (c) = Cosigned By      Initials Name Provider Type    Michaelle Manzanares, PT Physical Therapist                   Mobility       Row Name 04/09/25 0805          Bed Mobility    Bed Mobility supine-sit  -     Supine-Sit [place] (Bed Mobility) supervision  -     Assistive Device (Bed Mobility) bed rails;head of bed elevated  -[facility] Name 04/09/25 0805          Transfers    Comment, (Transfers) cues for hand placement for controlled descent into sitting  -[facility] Name 04/09/25 0805          Sit-Stand Transfer    Sit-Stand [place] (Transfers) standby assist  -     Assistive Device (Sit-Stand Transfers) walker, front-wheeled  -JW       Row Name 04/09/25 0805          Gait/Stairs (Locomotion)    [place] Level (Gait) standby assist  -     Assistive Device (Gait) walker, front-wheeled  -     Distance in Feet (Gait) 150  -     Bilateral Gait Deviations forward flexed posture  -     Comment, (Gait/Stairs) pt initially attempts gait without device, improved mobility with use of device  -               User Key  (r) = Recorded By, (t) = Taken By, (c) = Cosigned By      Initials Name Provider Type    Michaelle Manzanares PT Physical Therapist                   Obj/Interventions       [facility] Name 04/09/25 0805          Range of Motion Comprehensive    Comment, General Range of Motion LE ROM WFL bilaterally  -JW       Row Name 04/09/25 0805          Strength Comprehensive (MMT)    Comment, General Manual Muscle Testing (MMT) Assessment LE strength 4+/5 bilaterally  -[facility] Name 04/09/25 0805          Balance    Comment, Balance SBA for standing balance with walker  -JW       Row Name 04/09/25 0805          Sensory Assessment (Somatosensory)    Sensory Assessment (Somatosensory) other (see comments)  pt reports no numbness/tingling LEs  -                "User Key  (r) = Recorded By, (t) = Taken By, (c) = Cosigned By      Initials Name Provider Type    Michaelle Manzanares, PT Physical Therapist                   Goals/Plan    No documentation.                  Clinical Impression       Row Name 04/09/25 0805          Pain    Pretreatment Pain Rating 0/10 - no pain  -     Posttreatment Pain Rating 0/10 - no pain  -       Row Name 04/09/25 0805          Plan of Care Review    Plan of Care Reviewed With patient  -     Outcome Evaluation Physical therapy evaluation complete.  Patient performs bed mobility and transfers with SBA.  Patient performs gait with rolling walker x150 feet, SBA.  Patient attempts gait without device, however overall balance and safety improved with use of device.  Patient's strength and ROM equal bilateral LEs.  Patients reports she has a rolling walker at home and is agreeable to use.  Patient's reports only deficit at this time is typing on a computer--recommend further discussion with neuro APRN at follow up if difficulties continue.  No further PT needs in acute care setting.  -       Row Name 04/09/25 0805          Therapy Assessment/Plan (PT)    Patient/Family Therapy Goals Statement (PT) \"go home\"  -     Criteria for Skilled Interventions Met (PT) no problems identified which require skilled intervention  -     Therapy Frequency (PT) evaluation only  -       Row Name 04/09/25 0805          Positioning and Restraints    Pre-Treatment Position in bed  -     Post Treatment Position chair  -     In Chair reclined;call light within reach;encouraged to call for assist  -               User Key  (r) = Recorded By, (t) = Taken By, (c) = Cosigned By      Initials Name Provider Type    Michaelle Manzanares, PT Physical Therapist                   Outcome Measures       Row Name 04/09/25 0805          How much help from another person do you currently need...    Turning from your back to your side while in flat bed without using " bedrails? 4  -JW     Moving from lying on back to sitting on the side of a flat bed without bedrails? 3  -JW     Moving to and from a bed to a chair (including a wheelchair)? 3  -JW     Standing up from a chair using your arms (e.g., wheelchair, bedside chair)? 3  -JW     Climbing 3-5 steps with a railing? 3  -JW     To walk in hospital room? 3  -     AM-PAC 6 Clicks Score (PT) 19  -     Highest Level of Mobility Goal 6 --> Walk 10 steps or more  -       Row Name 04/09/25 0805          Modified David Scale    Pre-Stroke Modified [place] Scale 0 - No Symptoms at all.  -     Modified [place] Scale 2 - Slight disability.  Unable to carry out all previous activities but able to look after own affairs without assistance.  impacts work related activity  -       Row Name 04/09/25 0846 04/09/25 0805       Functional Assessment    Outcome Measure Options AM-PAC 6 Clicks Daily Activity (OT);Modified David  -SD AM-PAC 6 Clicks Basic Mobility (PT);Modified [place]  -              User Key  (r) = Recorded By, (t) = Taken By, (c) = Cosigned By      Initials Name Provider Type    Robin Escoto OTR Occupational Therapist    Michaelle Manzanares, BARAK Physical Therapist                  Physical Therapy Education       Title: PT OT SLP Therapies (In Progress)       Topic: Physical Therapy (Resolved)       Point: Mobility training (Resolved)       Learning Progress Summary            Patient Acceptance, E,TB, VU by  at 4/9/2025 0931                                      User Key       Initials Effective Dates Name Provider Type Discipline     06/16/21 -  Michaelle Land, BARAK Physical Therapist PT                  PT Recommendation and Plan     Outcome Evaluation: Physical therapy evaluation complete.  Patient performs bed mobility and transfers with SBA.  Patient performs gait with rolling walker x150 feet, SBA.  Patient attempts gait without device, however overall balance and safety improved with use of device.   Patient's strength and ROM equal bilateral LEs.  Patients reports she has a rolling walker at home and is agreeable to use.  Patient's reports only deficit at this time is typing on a computer--recommend further discussion with neuro APRN at follow up if difficulties continue.  No further PT needs in acute care setting.     Time Calculation:   PT Evaluation Complexity  History, PT Evaluation Complexity: 1-2 personal factors and/or comorbidities  Examination of Body Systems (PT Eval Complexity): 1-2 elements  Clinical Presentation (PT Evaluation Complexity): stable  Clinical Decision Making (PT Evaluation Complexity): low complexity  Overall Complexity (PT Evaluation Complexity): low complexity     PT Charges       Row Name 04/09/25 0932             Time Calculation    Start Time 0805  -      Stop Time 0835  -      Time Calculation (min) 30 min  -      PT Received On 04/09/25  -                User Key  (r) = Recorded By, (t) = Taken By, (c) = Cosigned By      Initials Name Provider Type    Michaelle Manzanares, PT Physical Therapist                  Therapy Charges for Today       Code Description Service Date Service Provider Modifiers Qty    [id number]  PT EVAL LOW COMPLEXITY 2 4/9/2025 Michaelle Land, PT GP 1            PT G-Codes  Outcome Measure Options: AM-PAC 6 Clicks Daily Activity (OT), Modified [place]  AM-PAC 6 Clicks Score (PT): 19  Modified [place] Scale: 2 - Slight disability.  Unable to carry out all previous activities but able to look after own affairs without assistance. (impacts work related activity)    PT Discharge Summary  Anticipated Discharge Disposition (PT): home    Michaelle Land PT  4/9/2025

## 2025-04-09 NOTE — CASE MANAGEMENT/SOCIAL WORK
"Continued Stay Note   Alex     Patient Name: Nery Jarrell  MRN: [id number]  Today's Date: 4/9/2025    Admit Date: 4/8/2025    Plan: Home with  and accepting HH agency   Discharge Plan       Row Name 04/09/25 1332       Plan    Plan Comments Per Cynthia Fairbanks with [facility] they do not currently have ST and have declined.  CM called Marika regarding referral for PT/OT/PT and she states she will review clinicals and let me know if they can accept. CM will follow.      Row Name 04/09/25 1135       Plan    Plan Home with  and accepting HH agency    Patient/Family in Agreement with Plan yes    Plan Comments 0940am, into room and introduced self and role of CM. Discussed discharge disposition with patient,  Asmita and hernesto Mccormack with permission. Patient is currently sitting up in the chair and voiced no complaints. Patient confirms the info on her face sheet is correct and she see's FREEDOM Winters as PCP. She states she uses [facility] in [place] and normally has no problem picking up or paying for her meds. She also states she does not have a living will and CM provided patient's daughter with information regarding one per her request. Patient states she lives with her  and 14 year old granddaughter in a two story house with a basement and two steps with no handrail to gain entry and normally has no issues maneuvering inside and states \"due to the stoop being broken from the garage she sometimes has issues getting into the home\". She states she is independent with her ADL's, works and drives and her  will be able to provide ride home at discharge. She also states she has a built in bench in  her shower and has a rolling walker at home she can use and does not anticipate needing any other equipment at discharge. CM discuss with patient that PT/OT has recommended in home therapy at discharge and provided her with a list of HH agencies for her preference and after reviewing " with daughter her first preference is [facility] and second is [facility]. She did decline the need for any other services such as STR or LTC at this time. Patient states she plans on returning home at discharge with her family to help as needed and  for PT.OT. She had no other questions. CM enetered referral into [facility] for [facility] and left  for Lesly regarding referral. CM will follow.                   Discharge Codes    No documentation.                       Annalisa Gongora RN

## 2025-04-09 NOTE — PLAN OF CARE
Problem: Adult Inpatient Plan of Care  Goal: Plan of Care Review  Outcome: Progressing  Flowsheets (Taken 4/9/2025 1215)  Outcome Evaluation: SLP Communication Evaluation completed: No deficits of speech or language skills noted. Able to name, repeat and follow 1, 2, 3 step directions. Very minimal word retrieval deficits noted particularly for names of people. Pt able to demonstrate spontaneous compensation for this. 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 Pt also reports difficulty managing a regular [employer] keyboard as well. Further cognitive evaluation warranted and will be completed as inpt. If discharge home prior to completing further evaluation, recommend home health follow up via SLP.  Plan of Care Reviewed With: (daughter; Dr. Milian via secure chat)   patient   spouse   child

## 2025-04-09 NOTE — THERAPY EVALUATION
Acute Care - Speech Language Pathology Initial Evaluation  [facility]     Patient Name: Nery Jarrell  : 1955  MRN: [id number]  Today's Date: 2025               Admit Date: 2025     Visit Dx:    ICD-10-CM ICD-9-CM   1. Cerebrovascular accident (CVA), unspecified mechanism  I63.9 434.91   2. Dizziness  R42 780.4   3. Hand paresthesia  R20.2 782.0   4. Bradycardia, unspecified  R00.1 427.89   5. At risk for obstructive sleep apnea  Z91.89 V49.89     Patient Active Problem List   Diagnosis    Anxiety and depression    Mixed hyperlipidemia    Essential hypertension    Class 1 obesity due to excess calories with serious comorbidity and body mass index (BMI) of 31.0 to 31.9 in adult    B12 deficiency    Avitaminosis D    Arthritis    Medicare annual wellness visit, subsequent    Screen for colon cancer    Encounter for screening mammogram for malignant neoplasm of breast    Menopause    Dermatitis    Lumbar radiculopathy    CVA (cerebral vascular accident)     Past Medical History:   Diagnosis Date    Abdominal wall mass 2014    Abnormal mammogram 2018    Abnormal mammogram 2015    Anxiety     Colon polyps     Colon stricture 2019    Depression     Foot deformity     GERD (gastroesophageal reflux disease)     Hyperlipidemia     Hypertension     Left breast mass 2015    OA (osteoarthritis)     Obesity     Post-menopausal     Vitamin B 12 deficiency      Past Surgical History:   Procedure Laterality Date    BILATERAL BREAST REDUCTION Bilateral     BREAST BIOPSY Left     CARDIAC CATHETERIZATION Right 2014    MILD NONOBSTRUCTIVE CAD, NORMAL LEFT VENTRICULAR SYSTOLIC FUNCTION, LEFT VENTRICULAR HYPERTROPHY, DR. LESTER HECK AT [facility]     SECTION N/A 1976     SECTION N/A 1979    CHOLECYSTECTOMY N/A 2014    LAPAROSCOPIC WITH CHOLANGIOGRAM, DR. TRAY NARVAEZ AT [facility]    COLONOSCOPY N/A 2003    GOOD PREP, NONSPECIFIC ILEITIS, SIGMOID DIVERTICULOSIS,  DR. AURY SOTELO AT [facility]    COLONOSCOPY N/A 07/17/2019    SIGMOID STRICTURE, DIVERTICULOSIS, PELVIC ADHESIONS, DR. AURY SOTELO AT  [facility]    COLONOSCOPY W/ POLYPECTOMY N/A 01/30/2003    LARGE INTERNAL HEMORRHOIDS, 3 MM HYPERPLASTIC POLYP IN RECTUM, SCATTERED DIVERTICULOSIS, DR. ADOLFO ISLAS AT [facility]     ERCP N/A 01/10/2014    CHOLEDOCHOLITHIASIS WAS FOUND, COMPLETE REMOVAL BY BILIARY SPHINCTEROTOMY AND BALLOON EXTRACTION, DR. MARGA MARAVILLA AT [facility]    ERCP N/A 01/12/2014    PRIOR BILIARY SPHINCTEROTOMY APPEARED STENOSED, DR. MARGA MARAVILLA AT [facility]    ERCP WITH FOREIGN BODY REMOVAL N/A 04/09/2014    PRIOR BILIARY SPHINCTEROTOMY APPEARD OPEN, 1 VISIBLY OCCLUDED STENT FROM THE BILIARY TREE WAS IN THE MAJOR PAPILLA, STENT REMOVED FROM BILIARY TREE, DR. MARGA MARAVILLA AT [facility]    MOUTH SURGERY N/A 08/31/2018    REDUCTION MAMMAPLASTY         SLP Recommendation and Plan  SLP Diagnosis: functional speech/language skills (04/09/25 1130)  SLP Diagnosis Comments: No deficits of speech or language skills noted. 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 Pt also reports difficulty managing a regular [employer] keyboard as well. Further cognitive evaluation warranted . See POC. (04/09/25 1130)           [facility] Skilled Therapy Interventions Met: no problems identified which require skilled intervention (04/09/25 1130)  Anticipated Discharge Disposition (SLP): No further SLP services warranted (04/09/25 1130)        Therapy Frequency (SLP [facility] 1 day per week (04/09/25 1130)  Predicted Duration Therapy Intervention (Days): 3 days, until discharge (04/09/25 1130)              Patient/Family Concerns, Anticipated Discharge Disposition (SLP): Pt only voices concerns about returning to work. (04/09/25 1130)              Outcome Evaluation: SLP Communication Evaluation  completed: No deficits of speech or language skills noted. Able to name, repeat and follow 1, 2, 3 step directions. Very minimal word retrieval deficits noted particularly for names of people. Pt able to demonstrate spontaneous compensation for this. 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 Pt also reports difficulty managing a regular [employer] keyboard as well. Further cognitive evaluation warranted and will be completed as inpt. If discharge home prior to completing further evaluation, recommend home health follow up via SLP. (04/09/25 1215)      SLP EVALUATION (Last 72 Hours)       SLP SLC Evaluation       Row Name 04/09/25 1130 04/09/25 0963                Communication Assessment/Intervention    Document Type evaluation  -AD --       Subjective Information no complaints  -AD --       Patient Observations alert;cooperative  -AD --       Patient/Family/Caregiver Comments/Observations Pt seen at bedside with daughter and spouse present with her permission. She was alert throughout and cooperative. Likes to joke around.  -AD --       Session Not Performed -- patient unavailable for evaluation  -AD       Evaluation Not Performed, Comment -- With Sona Malcolm. Will follow up later this morning.  -AD       Patient Effort good  -AD --       Symptoms Noted During/After Treatment none  -AD --          General Information    Patient Profile Reviewed yes  -AD --       Precautions/Limitations, Vision WFL  -AD --       Precautions/Limitations, Hearing WFL;other (see comments)  Pt reports some hearing loss that has been evaluated. No difficulty in a quiet 1:1 setting noted.  -AD --       Prior Level of Function-Communication WFL  -AD --       Plans/Goals Discussed with patient;agreed upon  -AD --       Barriers to Rehab none identified  -AD --       Patient's Goals for Discharge  return to home;return to all previous roles/activities;return to work  -AD --       Family Goals for Discharge functional communication;functional cognition  -AD --          Pain    Pre/Posttreatment Pain Comment No current pain reported or indicated  -AD --          Comprehension Assessment/Intervention    Comprehension Assessment/Intervention Auditory Comprehension  -AD --          Auditory Comprehension Assessment/Intervention    Auditory Comprehension (Communication) WFL  -AD --       Able to Identify Objects/Pictures (Communication) body part;familiar objects;WFL  -AD --       Answers Questions (Communication) yes/no;wh questions;personal;simple;WFL  -AD --       Able to Follow Commands (Communication) 1-step;2-step;3-step;WFL  -AD --       Narrative Discourse WFL  -AD --       Auditory Comprehension Communication, Comment No gross deficits of auditory comprehension.  -AD --          Expression Assessment/Intervention    Expression Assessment/Intervention verbal expression  -AD --          Verbal Expression Assessment/Intervention    Verbal Expression WFL  -AD --       Automatic Speech (Communication) response to greeting;days of week;WFL  -AD --       Repetition words;phrases;sentences;WFL  -AD --       Responsive Naming simple;WFL  -AD --       Confrontational Naming high frequency;low frequency;WFL  -AD --       Spontaneous/Functional Words WFL;other (see comments)  -AD --       Conversational Discourse/Fluency WFL  -AD --       Verbal Expression, Comment Pt with functional verbal expression. Mild, barely perceptible word retrieval deficits noted and primarily for specific names of people or places. Pt very quick to repair or talk around the word and communicates her intended message.  -AD --          Oral Motor Structure and Function    Oral Motor Structure and Function WFL  -AD --       Oral Lesions or Structural Abnormalities and/or variants none  -AD --       Dentition Assessment other (see comments)   full implants  -AD --       Mucosal Quality moist, healthy  -AD --          Oral Musculature and Cranial Nerve Assessment    Oral Motor General Assessment WFL  -AD --       Oral Motor, Comment No deficits of oral musculature or cranial nerves.  -AD --          Motor Speech Assessment/Intervention    Motor Speech Function WFL  -AD --       Initiation of Phonation (Communication) voluntary;involuntary - (e.g. sneezing, laughing, yawning);WFL  -AD --       Automatic Speech (Communication) response to greeting;days of week;WFL  -AD --       Verbal Repetition (Communication) monosyllabic words;polysyllabic words;phrases;sentences;WFL  -AD --       Conversational Speech (Communication) WFL  -AD --       Speech intelligibility 100%;in quiet environment;in connected speech;with unfamiliar listener  -AD --       Motor Speech, Comment No deficit or symptoms of dysarthria or apraxia.  -AD --          Cursory Voice Assessment/Intervention    Quality and Resonance (Voice) WFL  -AD --          SLP Evaluation Clinical Impressions    SLP Diagnosis functional speech/language skills  -AD --       SLP Diagnosis Comments No deficits of speech or language skills noted. 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 Pt also reports difficulty managing a regular [employer] keyboard as well. Further cognitive evaluation warranted . See POC.  -AD --       Rehab Potential/Prognosis good  -AD --       SLC Criteria for Skilled Therapy Interventions Met no problems identified which require skilled intervention  -AD --       Functional Impact other (see comments)  difficulty completing work tasks  -AD --          Recommendations    Therapy Frequency (SLP SLC) 1 day per week  -AD --       Predicted Duration Therapy Intervention (Days) 3 days;until discharge  -AD --       Anticipated Discharge Disposition (SLP) No  further SLP services warranted  -AD --       Patient/Family Concerns, Anticipated Discharge Disposition (SLP) Pt only voices concerns about returning to work.  -AD --          Communication Treatment Objective and Progress Goals (SLP)    SLC LTGs Patient will demonstrate functional cognitive-linguistic skills for return to discharge environment  -AD --       Diagnostic Treatment Objective Diagnostic Treatment Objectives (Group)  -AD --          Patient will demonstrate functional cognitive-linguistic skills for return to discharge environment    [place] with minimal cues;with use of compensatory strategies  -AD --       Time frame 1 week  -AD --       Progress/Outcomes new goal  -AD --          Diagnostic Treatment Objectives    Diagnostic Treatment Objective Selection Diagnostic Treatment Objectives  -AD --          SLP Diagnostic Treatment     Patient will participate in further assessment in the following areas reading comprehension;graphic expression;cognitive-linguistic  -AD --       Time Frame (Diagnostic) short term goal (STG);3 days;by discharge  -AD --       Progress/Outcomes (Additional Goal 1, SLP) new goal  -AD --                 User Key  (r) = Recorded By, (t) = Taken By, (c) = Cosigned By      Initials Name Effective Dates    AD Evelin Basilio MS CCC-SLP 06/16/21 -                        EDUCATION  The patient has been educated in the following areas:     Cognitive Impairment Communication Impairment. Word finding deficits and possible cognitive deficits reviewed. Pt, spouse and daughter verbalize understanding.            SLP GOALS       Row Name 04/09/25 1130             Patient will demonstrate functional cognitive-linguistic skills for return to discharge environment    [place] with minimal cues;with use of compensatory strategies  -AD      Time frame 1 week  -AD      Progress/Outcomes new goal  -AD         SLP Diagnostic Treatment     Patient will participate in further assessment  in the following areas reading comprehension;graphic expression;cognitive-linguistic  -AD      Time Frame (Diagnostic) short term goal (STG);3 days;by discharge  -AD      Progress/Outcomes (Additional Goal 1, SLP) new goal  -AD                User Key  (r) = Recorded By, (t) = Taken By, (c) = Cosigned By      Initials Name Provider Type    Evelin Degroot MS CCC-SLP Speech and Language Pathologist                              Time Calculation:      Time Calculation- SLP       Row Name 04/09/25 1441             Time Calculation- SLP    SLP Start Time 1130  -AD      SLP Stop Time 1215  -AD      SLP Time Calculation (min) 45 min  -AD      Total Timed Code Minutes- SLP 0 minute(s)  -AD      SLP Non-Billable Time (min) 0 min  -AD      SLP Received On 04/09/25  -AD         Untimed Charges    SLP Eval/Re-eval  ST Eval Speech and Production w/ Language - 67571  -AD      [id number] Eval Speech and Production w/ Language Minutes 45  -AD         Total Minutes    Untimed Charges Total Minutes 45  -AD       Total Minutes 45  -AD                User Key  (r) = Recorded By, (t) = Taken By, (c) = Cosigned By      Initials Name Provider Type    AD Evelin Basilio, MS CCC-SLP Speech and Language Pathologist                    Therapy Charges for Today       Code Description Service Date Service Provider Modifiers Qty    [id number] HC ST EVAL SPEECH AND PROD W LANG  3 4/9/2025 Evelin Basilio, MS CCC-SLP GN 1                       Evelin Basilio MS CCC-SLP  4/9/2025

## 2025-04-09 NOTE — CONSULTS
DOS: 2025  NAME: Nery Jarrell   : 1955  PCP: Cornelio Rosa APRN  CC: Stroke              Neurological Problem and Interval History:  70 y.o. right-handed  female with known past medical history of hypertension, hyperlipidemia, osteoarthritis, B12 deficiency, depression/anxiety, GERD, former tobacco abuse presented to [facility]  due to impaired use of hands-unable to type (patient is a ) which started on  evening and has persisted.  On arrival patient hypertensive at 214/79 heart rate 53 and afebrile at 98.0.    Since admission, patient had CT of the head that showed decreased attenuation in the basal ganglia and evidence of chronic lacunar infarct without evidence of hemorrhage or mass effect.  MRI of the brain later showed acute pontine and right basal ganglion infarct.  MRA showed no evidence of high-grade stenosis/aneurysm and/or dissection.  tPA was not considered as patient was out of the time treatment window.  EKG sinus bradycardia.  ; unchanged when compared to prior EKGs.  Prior to arrival patient not on aspirin or statin was given [place] aspirin n.p.o. statin since admission.  Labs: A1c 6.050 lipid panel: Total cholesterol 421, triglycerides 210,  will ask to repeat this to ensure accuracy.  UA specific gravity 1.040, trace of blood, protein 2+, negative nitrites.  Magnesium 2.0.    Neurologically, improved still feels like she has some impaired dexterity of her hands specifically right hand although not notable on exam.  NIHSS 0.    Past Medical/Surgical Hx:  Past Medical History:   Diagnosis Date    Abdominal wall mass 2014    Abnormal mammogram 2018    Abnormal mammogram 2015    Anxiety     Colon polyps     Colon stricture 2019    Depression     Foot deformity     GERD (gastroesophageal reflux disease)     Hyperlipidemia     Hypertension     Left breast mass 2015    OA (osteoarthritis)     Obesity     Post-menopausal      Vitamin B 12 deficiency      Past Surgical History:   Procedure Laterality Date    BILATERAL BREAST REDUCTION Bilateral     BREAST BIOPSY Left     CARDIAC CATHETERIZATION Right 2014    MILD NONOBSTRUCTIVE CAD, NORMAL LEFT VENTRICULAR SYSTOLIC FUNCTION, LEFT VENTRICULAR HYPERTROPHY, DR. LESTER HECK AT [facility]     SECTION N/A 1976     SECTION N/A 1979    CHOLECYSTECTOMY N/A 2014    LAPAROSCOPIC WITH CHOLANGIOGRAM, DR. TRAY NARVAEZ AT [facility]    COLONOSCOPY N/A 2003    GOOD PREP, NONSPECIFIC ILEITIS, SIGMOID DIVERTICULOSIS, DR. AURY SOTELO AT [facility]    COLONOSCOPY N/A 2019    SIGMOID STRICTURE, DIVERTICULOSIS, PELVIC ADHESIONS, DR. AURY SOTELO AT  [facility]    COLONOSCOPY W/ POLYPECTOMY N/A 2003    LARGE INTERNAL HEMORRHOIDS, 3 MM HYPERPLASTIC POLYP IN RECTUM, SCATTERED DIVERTICULOSIS, DR. ADOLFO ISLAS AT [facility]     ERCP N/A 01/10/2014    CHOLEDOCHOLITHIASIS WAS FOUND, COMPLETE REMOVAL BY BILIARY SPHINCTEROTOMY AND BALLOON EXTRACTION, DR. MARGA MARAVILLA AT [facility]    ERCP N/A 2014    PRIOR BILIARY SPHINCTEROTOMY APPEARED STENOSED, DR. MARGA MARAVILLA AT [facility]    ERCP WITH FOREIGN BODY REMOVAL N/A 2014    PRIOR BILIARY SPHINCTEROTOMY APPEARD OPEN, 1 VISIBLY OCCLUDED STENT FROM THE BILIARY TREE WAS IN THE MAJOR PAPILLA, STENT REMOVED FROM BILIARY TREE, DR. MARGA MARAVILLA AT [facility]    MOUTH SURGERY N/A 2018    REDUCTION MAMMAPLASTY         Review of Systems:        A complete review of all systems is negative except as described above.     Medications On Admission  Medications Prior to Admission   Medication Sig Dispense Refill Last Dose/Taking    diazePAM (VALIUM) 5 MG tablet Take 1 tablet by mouth At Night As Needed for Anxiety or Sleep. 20 tablet 0 Past Week    diphenhydrAMINE-acetaminophen (TYLENOL PM)  MG tablet per tablet Take 1 tablet by mouth At Night As Needed for Sleep.   2025 at 10:00 PM    escitalopram (LEXAPRO) 10 MG  "tablet Take 1.5 tablets by mouth Every Night.   4/7/2025 at 10:00 PM    hydroCHLOROthiazide (HYDRODIURIL) 25 MG tablet Take 1 tablet by mouth Daily. Indications: Edema 90 tablet 3 4/7/2025 at 10:00 AM    hydrocortisone 2.5 % ointment Apply 1 application topically to the appropriate area as directed 2 (Two) Times a Day. 30 g 0 Past Month    ibuprofen (ADVIL,MOTRIN) 800 MG tablet Take 1 tablet by mouth Every 8 (Eight) Hours As Needed for Mild Pain. 90 tablet 0 Past Week    magnesium gluconate (MAGONATE) 500 MG tablet Take 1 tablet by mouth 2 (Two) Times a Day. Pt taking once a day   4/6/2025 at 10:00 PM    melatonin 5 MG tablet tablet Take 2 tablets by mouth Every Night.   4/7/2025 at 10:00 PM    metoprolol succinate XL (TOPROL-XL) 100 MG 24 hr tablet TAKE 1 TABLET BY MOUTH ONCE DAILY AT NIGHT AT BEDTIME FOR HIGH BLOOD PRESSURE (Patient taking differently: Take 1 tablet by mouth Every Night.) 90 tablet 3 4/6/2025 at 10:00 PM    Omega 3 1000 MG capsule Take 1,000 mg by mouth Daily.   Past Week at 10:00 AM    Turmeric (QC Tumeric Complex) 500 MG capsule Take 2 capsules by mouth Daily With Breakfast. Indications: Inflammation   Past Week at 10:00 AM    valsartan (DIOVAN) 320 MG tablet Take 1 tablet by mouth Daily. Indications: High Blood Pressure Disorder 90 tablet 1 4/7/2025 at  4:00 PM    Vitamin D, Cholecalciferol, 1000 units capsule Take 5 capsules by mouth Daily.   4/6/2025       Allergies:  Allergies   Allergen Reactions    Dextromethorphan Unknown - Low Severity    Elavil [Amitriptyline Hcl] Dizziness    Norvasc [Amlodipine Besylate] Swelling     Ankles swelling       Social Hx:  Social History     Socioeconomic History    Marital status:      Spouse name: Venu\"Miguel\"    Number of children: 2    Years of education: College degree   Tobacco Use    Smoking status: Former     Current packs/day: 0.00     Average packs/day: 1 pack/day for 11.0 years (11.0 ttl pk-yrs)     Types: Cigarettes     Start date: 1993 "     Quit date: 2004     Years since quittin.2    Smokeless tobacco: Never   Vaping Use    Vaping status: Never Used   Substance and Sexual Activity    Alcohol use: Yes     Comment: 0-1 per month    Drug use: No     Comment: quit marijuana 19yrs ago    Sexual activity: Yes     Partners: Male     Birth control/protection: Post-menopausal       Family Hx:  Family History   Problem Relation Age of Onset    COPD Mother     Emphysema Mother     Heart disease Mother     Heart disease Father     Kidney disease Father     Vision loss Father     Cancer Father     Diabetes Father     Lymphoma Father     Stroke Brother     Hypertension Brother     Coronary artery disease Brother         stent    Peripheral vascular disease Brother         stent in the carotid    Diabetes Brother     No Known Problems Daughter     Drug abuse Son     Breast cancer Maternal Aunt        Review of Imaging (Interpretation of images not reports):    Discussed above reviewed independently.    Laboratory Results:   Lab Results   Component Value Date    GLUCOSE 115 (H) 2025    CALCIUM 9.7 2025     2025    K 4.3 2025    CO2 24.4 2025     2025    BUN 22 2025    CREATININE 0.92 2025    EGFRIFAFRI 89 2021    EGFRIFNONA 74 2021    BCR 23.9 2025    ANIONGAP 13.6 2025     Lab Results   Component Value Date    WBC 7.33 2025    HGB 14.6 2025    HCT 43.6 2025    MCV 89.0 2025     2025     Lab Results   Component Value Date    CHOL 421 (H) 2025     Lab Results   Component Value Date    HDL 57 2025    HDL 79 2023    HDL 62 (H) 2021     Lab Results   Component Value Date     (H) 2025     (H) 2023     (H) 2021     Lab Results   Component Value Date    TRIG 210 (H) 2025    TRIG 96 2023    TRIG 96 2021     Lab Results   Component Value Date    HGBA1C 6.05 (H)  "04/08/2025     Lab Results   Component Value Date    INR 1.0 01/08/2014    PROTIME 12.7 01/08/2014         Physical Examination:  /79 (BP Location: Right arm, Patient Position: Sitting)   Pulse 56   Temp 98 °F (36.7 °C) (Oral)   Resp 18   Ht 157.5 cm (62\")   Wt 91.1 kg (200 lb 13.4 oz)   SpO2 96%   BMI 36.73 kg/m²   General Appearance:   Well developed, well nourished, well groomed, alert, and cooperative.  HEENT:   Normocephalic.    Neck and Spine:  Normal range of motion.  Normal alignment. No mass or tenderness. No bruits.  Cardiac: Regular rate and rhythm. No murmurs.  Peripheral Vasculature: Radial and pedal pulses are equal and symmetric.   Extremities:    No edema or deformities. Normal joint ROM.  Skin:    No rashes or birth marks.    Neurological examination:  Higher Integrative  Function:   Oriented to time, place and person. Normal registration, recall, attention span and concentration. Normal language including comprehension, spontaneous speech, repetition, reading, writing, naming and vocabulary. No neglect with normal visual-spatial function and construction. Normal fund of knowledge and higher integrative function.  CN II:    Pupils are equal, round, and reactive to light. Normal visual acuity and visual fields.    CN III IV VI:   Extraocular movements are full without nystagmus.   CN V:    Normal facial sensation and strength of muscles of mastication.  CN VII:   Facial movements are symmetric. No weakness.  CN VIII:   Auditory acuity is normal.  CN IX & X:   Symmetric palatal movement.  CN XI:    Sternocleidomastoid and trapezius are normal.  No weakness.  CN XII:   The tongue is midline.  No atrophy or fasciculations.  Motor:    Normal muscle strength, bulk and tone in upper and lower extremities.  No fasciculations, rigidity, spasticity, or abnormal movements.  Sensation:   Normal to light touch, pinprick, vibration  in arms and legs.   Station and Gait:  Normal gait and station.  "   Coordination:  Finger-to-nose test shows no dysmetria.        Diagnoses:   1.  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  DAPT therapy x 21 days followed by monotherapy with aspirin 81 mg daily and high-dose statin could consider adding co-Q10 to avoid/mitigate myalgias-could consider addition of Repatha in the outpatient setting  2.  Hypertension  3.  Hyperlipidemia  4.  Former tobacco abuse  5.  At risk for obstructive sleep apnea-outpatient DARLENE evaluation advised  6.  Obesity; BMI 36.7  7.  Low serum B12; on replacement        Plan:  Aspirin 81 mg daily-started this admission  Plavix 75 mg x 21 days then discontinue  Rosuvastatin 40 mg daily; LDL this admission 318 requesting repeat testing-will recommend addition of Repatha in the outpatient setting goal LDL 40-70  Outpatient DARLENE evaluate; order placed  Long-term Holter at discharge 10 to 14 days  TTE-pending  Hydrate; avoid dehydration/hypovolemia  Labs: B12/folate/MMA  Neuro checks  PT/OT/ST  Stroke Education  Blood pressure control to <130/80; avoid hypotension/hypertension  Goal LDL <70-recommend high dose statins-   Serum glucose < 140  Follow-up with me in 90 days-appointment scheduled for 7/8/2025; would advise 2 to 6 weeks off work if able-discussed poststroke fatigue and post stroke depression potential     Call 911 for stroke any stroke symptoms    I have discussed the above with the patient and family.  Time spent with patient: 60min    MDM  Reviewed: vitals, previous chart and nursing note  Reviewed previous: MRI, ECG, x-ray and labs  Interpretation: MRI, ECG, x-ray and labs    Case reviewed face-to-face with telehealth neurologist  and he agrees with treatment plan above.    Sign off plan discussed with  hospitalist Dr. Milian    Dictated using Dragon dictation.     04/09/25  14:01 EDT repeat lipid panel similar to initial LDL remains greater than 300 will recommend initiating Repatha will begin PA. In conjunction with high-dose statin- Crestor 40mg daily.           FREEDOM Fried

## 2025-04-09 NOTE — PLAN OF CARE
Goal Outcome Evaluation:  Plan of Care Reviewed With: patient           Outcome Evaluation: Physical therapy evaluation complete.  Patient performs bed mobility and transfers with SBA.  Patient performs gait with rolling walker x150 feet, SBA.  Patient attempts gait without device, however overall balance and safety improved with use of device.  Patient's strength and ROM equal bilateral LEs.  Patients reports she has a rolling walker at home and is agreeable to use.  Patient's reports only deficit at this time is typing on a computer--recommend further discussion with neuro APRN at follow up if difficulties continue.  No further PT needs in acute care setting.  Patient may benefit from home health PT at discharge.    Anticipated Discharge Disposition (PT): home

## 2025-04-09 NOTE — CONSULTS
"Diabetes Education  Assessment/Teaching    Patient Name:  Nery Jarrell  YOB: 1955  MRN: [id number]  Admit Date:  4/8/2025      Assessment Date:  4/9/2025  Flowsheet Row Most Recent Value   General Information     Referral From: Other (comment)  [Automatic consult per Stroke Protocol]   Height 157.5 cm (62\")   Height Method Stated   Weight 91.1 kg (200 lb 13.4 oz)   Weight Method Bed scale   Pregnancy Assessment    Diabetes History    What type of diabetes do you have? Other (comment)  [Patient reports no history of prediabetes or diabetes.  Does report strong family history of T2DM in father and daughter]   Current DM knowledge fair   Education Preferences    What areas of diabetes would you like to learn about? other (comment)  [Preventing T2DM]   Barriers to Learning other (comment)  [Patient reports feeling overwhelmed by new onset medical issues]   Nutrition Information    Are you currently following a special meal plan? never   Assessment Topics    DM Goals    Healthy Eating - Goal 0-7 days from discharge   Being Active - Goal 0-7 days from discharge   Contact Plan Follow-up medical care, Outpatient DM education referral, 0-30 days from discharge            Flowsheet Row Most Recent Value   DM Education Needs    Reducing Risks A1C testing, Lipids, Blood pressure   Healthy Eating RD consult   Healthy Coping Anxious   Motivation Engaged   Teaching Method Explanation, Discussion   Patient Response Verbalized understanding              Other Comments:  Spoke with Nery via in-room phone.  Patient denies known history of prediabetes. Does report strong family history of T2DM in both father and daughter.  Verbalizes understanding of prediabetes and lifestyle modification of diet and activity to prevent or delay T2DM.  Patient reports desire for health improvement, but states feeling overwhelmed at this time related to current medical issues.  Patient agreeable to RD consult prior to discharge as well as " "plan to follow-up outpatient as needed.  Starting simple such as cutting out sugared beverages (patient reports \"orange Hi C\" as beverage of choice) will improve blood glucose.  Patient agreeable.  Patient states plan to follow-up with PCP for continued monitoring/management.    Electronically signed by:  Elida Dave RN, [facility] HealthFÉILX, MIGUEL  04/09/25 10:48 EDT  "

## 2025-04-09 NOTE — PLAN OF CARE
Goal Outcome Evaluation:  Plan of Care Reviewed With: patient   No risk of another stroke.

## 2025-04-09 NOTE — CASE MANAGEMENT/SOCIAL WORK
Discharge Planning Assessment  [facility]     Patient Name: Nery Jarrell  MRN: [id number]  Today's Date: 4/9/2025    Admit Date: 4/8/2025    Plan: Home with  and accepting  agency   Discharge Needs Assessment       Row Name 04/09/25 1126       Living Environment    People in Home spouse    Name(s) of People in Home Miguel Jarrell,     Current Living Arrangements home    Duration at Residence since 1989    Potentially Unsafe Housing Conditions none    In the past 12 months has the electric, gas, oil, or water company threatened to shut off services in your home? No    Primary Care Provided by self    Provides Primary Care For no one    Caregiving Concerns pt voiced no care giving concerns at this time.    Family Caregiver if Needed spouse    Family Caregiver Names Miguel,  and 14 year old granddaughter    Quality of Family Relationships helpful;involved;supportive    Able to Return to Prior Arrangements yes    Living Arrangement Comments Patient states she lives with  her  and 14 year old granddaughter in a two story house with a basement and 2 steps with no handrail to gain entry       Resource/Environmental Concerns    Resource/Environmental Concerns none    Transportation Concerns none       Transportation Needs    In the past 12 months, has lack of transportation kept you from medical appointments or from getting medications? no    In the past 12 months, has lack of transportation kept you from meetings, work, or from getting things needed for daily living? No       Food Insecurity    Within the past 12 months, you worried that your food would run out before you got the money to buy more. Never true    Within the past 12 months, the food you bought just didn't last and you didn't have money to get more. Never true       Transition Planning    Patient/Family Anticipates Transition to home with family;home with help/services    Patient/Family Anticipated Services at Transition case  manager;home health care    Transportation Anticipated family or friend will provide  pt states her  will be able to provide ride home at discharge       Discharge Needs Assessment    Readmission Within the Last 30 Days no previous admission in last 30 days    Current Outpatient/Agency/Support Group --  none    Equipment Currently Used at Home shower chair;walker, rolling  pt states she has a built in bench in shower and has a rolling walker she is not currently using    Concerns to be Addressed adjustment to diagnosis/illness;discharge planning    Concerns Comments Pt will need PT/OT at discharge    Anticipated Changes Related to Illness none    Equipment Needed After Discharge none    Outpatient/Agency/Support Group Needs homecare agency    Discharge Facility/Level of Care Needs home with home health    Provided Post Acute Provider List? Yes    Post Acute Provider List Home Health    Provided Post Acute Provider Quality & Resource List? Yes    Post Acute Provider Quality and Resource List Home Health    Delivered To Patient    Method of Delivery In person    Offered/Gave Vendor List yes    Patient's Choice of Community Agency(s) [facility]    Current Discharge Risk --  none    Discharge Coordination/Progress Patient states she plans on returning home at discharge with her  and granddaughter to help as needed and is agreeable for  for PT/OT.                   Discharge Plan       Row Name 04/09/25 1135       Plan    Plan Home with  and accepting HH agency    Patient/Family in Agreement with Plan yes    Plan Comments 0940am, into room and introduced self and role of CM. Discussed discharge disposition with patient,  Asmita and daughter Ksenia with permission. Patient is currently sitting up in the chair and voiced no complaints. Patient confirms the info on her face sheet is correct and she see's FREEDOM Winters as PCP. She states she uses [facility] pharmacy in [place] and normally has no  "problem picking up or paying for her meds. She also states she does not have a living will and CM provided patient's daughter with information regarding one per her request. Patient states she lives with her  and 14 year old granddaughter in a two story house with a basement and two steps with no handrail to gain entry and normally has no issues maneuvering inside and states \"due to the stoop being broken from the garage she sometimes has issues getting into the home\". She states she is independent with her ADL's, works and drives and her  will be able to provide ride home at discharge. She also states she has a built in bench in  her shower and has a rolling walker at home she can use and does not anticipate needing any other equipment at discharge. CM discuss with patient that PT/OT has recommended in home therapy at discharge and provided her with a list of HH agencies for her preference and after reviewing with daughter her first preference is [facility]  and second is [facility]. She did decline the need for any other services such as STR or LTC at this time. Patient states she plans on returning home at discharge with her family to help as needed and  for PT.OT. She had no other questions. CM entered referral into [facility] for [facility] and left  for Lesly regarding referral. CM will follow.                  Continued Care and Services - Admitted Since 4/8/2025       Home Medical Care       Service Provider Request Status Services Address Phone Fax Patient Preferred    [facility] [employer] [facility] Pending - Request Sent -- [address] [place] [id number] [phone] --                     Demographic Summary    No documentation.                  Functional Status    No documentation.                  Psychosocial    No documentation.                  Abuse/Neglect    No documentation.                  Legal    No documentation.                  Substance " Abuse    No documentation.                  Patient Forms    No documentation.                     Annalisa Gongora RN

## 2025-04-09 NOTE — SIGNIFICANT NOTE
04/09/25 0941   Communication Assessment/Intervention   Session Not Performed patient unavailable for evaluation   Evaluation Not Performed, Comment With Sona Malcolm. Will follow up later this morning.

## 2025-04-09 NOTE — THERAPY EVALUATION
Patient Name: Nery Jarrell  : 1955    MRN: [id number]                              Today's Date: 2025       Admit Date: 2025    Visit Dx:     ICD-10-CM ICD-9-CM   1. Cerebrovascular accident (CVA), unspecified mechanism  I63.9 434.91   2. Dizziness  R42 780.4   3. Hand paresthesia  R20.2 782.0   4. Bradycardia, unspecified  R00.1 427.89   5. At risk for obstructive sleep apnea  Z91.89 V49.89     Patient Active Problem List   Diagnosis    Anxiety and depression    Mixed hyperlipidemia    Essential hypertension    Class 1 obesity due to excess calories with serious comorbidity and body mass index (BMI) of 31.0 to 31.9 in adult    B12 deficiency    Avitaminosis D    Arthritis    Medicare annual wellness visit, subsequent    Screen for colon cancer    Encounter for screening mammogram for malignant neoplasm of breast    Menopause    Dermatitis    Lumbar radiculopathy    CVA (cerebral vascular accident)     Past Medical History:   Diagnosis Date    Abdominal wall mass 2014    Abnormal mammogram 2018    Abnormal mammogram 2015    Anxiety     Colon polyps     Colon stricture 2019    Depression     Foot deformity     GERD (gastroesophageal reflux disease)     Hyperlipidemia     Hypertension     Left breast mass 2015    OA (osteoarthritis)     Obesity     Post-menopausal     Vitamin B 12 deficiency      Past Surgical History:   Procedure Laterality Date    BILATERAL BREAST REDUCTION Bilateral     BREAST BIOPSY Left     CARDIAC CATHETERIZATION Right 2014    MILD NONOBSTRUCTIVE CAD, NORMAL LEFT VENTRICULAR SYSTOLIC FUNCTION, LEFT VENTRICULAR HYPERTROPHY[facility]     SECTION N/A 1976     SECTION N/A 1979    CHOLECYSTECTOMY N/A 2014    LAPAROSCOPIC WITH CHOLANGIOGRAM, DR[facility] TRAY [facility]    COLONOSCOPY N/A 2003    GOOD PREP, NONSPECIFIC ILEITIS, SIGMOID DIVERTICULOSIS, DR[facility]URY [facility]OTELO [facility]    COLONOSCOPY N/A  "07/17/2019    SIGMOID STRICTURE, DIVERTICULOSIS, PELVIC ADHESIONS, DR. AURY SOTELO AT  [facility]    COLONOSCOPY W/ POLYPECTOMY N/A 01/30/2003    LARGE INTERNAL HEMORRHOIDS, 3 MM HYPERPLASTIC POLYP IN RECTUM, SCATTERED DIVERTICULOSIS, DR. ADOLFO ISLAS AT [facility]     ERCP N/A 01/10/2014    CHOLEDOCHOLITHIASIS WAS FOUND, COMPLETE REMOVAL BY BILIARY SPHINCTEROTOMY AND BALLOON EXTRACTION, DR. MARGA MARAVILLA AT [facility]    ERCP N/A 01/12/2014    PRIOR BILIARY SPHINCTEROTOMY APPEARED STENOSED, DR. MARGA MARAVILLA AT [facility]    ERCP WITH FOREIGN BODY REMOVAL N/A 04/09/2014    PRIOR BILIARY SPHINCTEROTOMY APPEARD OPEN, 1 VISIBLY OCCLUDED STENT FROM THE BILIARY TREE WAS IN THE MAJOR PAPILLA, STENT REMOVED FROM BILIARY TREE, DR. MARGA MARAVILLA AT [facility]    MOUTH SURGERY N/A 08/31/2018    REDUCTION MAMMAPLASTY        General Information       Row Name 04/09/25 0892          OT Time and Intention    Subjective Information complains of  \"hands not working right\" for typing.  -SD     Document Type evaluation  -SD     Mode of Treatment occupational therapy  -SD     Patient Effort good  -SD       Row Name 04/09/25 0850          General Information    Patient Profile Reviewed yes  Pt c/o \"hands not working\" well since this past Sunday. She also slid out of bed and required assistance to get up and also had a fall on Monday. Pt went to work as a  on Monday, yet she had difficulty typing (lots of errors). Dx of CVA.  -SD     Prior Level of Function independent:;ADL's;all household mobility;work  -SD     Existing Precautions/Restrictions fall  -SD       Row Name 04/09/25 0850          Occupational Profile    Reason for Services/Referral (Occupational Profile) stroke protocol  -SD     Successful Occupations (Occupational Profile) I with daily tasks (including working as a )  -SD     Occupational History/Life Experiences (Occupational Profile) Pt reports hx of chronic back and knee pain which impacts mobility  -SD     " Environmental Supports and Barriers (Occupational Profile) spouse and granchild in home  -SD     Patient Goals (Occupational Profile) go home  -SD       Row Name 04/09/25 0850          Living Environment    Current Living Arrangements home  -SD     People in Home spouse;grandchild(fransisca)  -SD       Row Name 04/09/25 0850          Home Main Entrance    Number of Stairs, Main Entrance one  -SD     Stair Railings, Main Entrance none  -SD       Row Name 04/09/25 0850          Stairs Within Home, Primary    Stairs, Within Home, Primary 2 story home. Bedrooms on 2nd level  -SD       Row Name 04/09/25 0850          Cognition    Orientation Status (Cognition) other (see comments);oriented to;person;place;situation  pt initally required cues for time orientation. She responded appropriately to time orientation at end of tx  -SD       Row Name 04/09/25 0850          Safety Issues/Impairments Affecting Functional Mobility    Impairments Affecting Function (Mobility) balance;other (see comments)  -SD               User Key  (r) = Recorded By, (t) = Taken By, (c) = Cosigned By      Initials Name Provider Type    SD Robin Basilio OTR Occupational Therapist                     Mobility/ADL's       Row Name 04/09/25 0901          Bed Mobility    Bed Mobility supine-sit  -SD     Supine-Sit [place] (Bed Mobility) supervision  -SD     Assistive Device (Bed Mobility) bed rails;head of bed elevated  -SD       Row Name 04/09/25 0901          Transfers    Transfers sit-stand transfer;stand-sit transfer  -SD       Row Name 04/09/25 0901          Sit-Stand Transfer    Sit-Stand [place] (Transfers) standby assist  -SD       Row Name 04/09/25 0901          Stand-Sit Transfer    Stand-Sit [place] (Transfers) standby assist  -SD       Row Name 04/09/25 0901          Functional Mobility    Functional Mobility- Ind. Level standby assist  -SD     Functional Mobility- Device walker, front-wheeled  -SD     Functional  Mobility-Distance (Feet) 150  -SD       [facility] Name 04/09/25 0901          Activities of Daily Living    BADL Assessment/Intervention other (see comments)  Pt reports no concerns for management of basic adl's.  -SD               User Key  (r) = Recorded By, (t) = Taken By, (c) = Cosigned By      Initials Name Provider Type    Robin Escoto OTR Occupational Therapist                   Obj/Interventions       Row Name 04/09/25 0902          Sensory Assessment (Somatosensory)    Sensory Assessment (Somatosensory) sensation intact  -SD       Row Name 04/09/25 0902          Vision Assessment/Intervention    Visual Impairment/Limitations WFL;corrective lenses full-time  -SD       [facility] Name 04/09/25 0902          Range of Motion Comprehensive    General Range of Motion no range of motion deficits identified  -SD     Comment, General Range of Motion BUE rom symmetrical  -SD       Row Name 04/09/25 0902          Strength Comprehensive (MMT)    General Manual Muscle Testing (MMT) Assessment no strength deficits identified  -SD     Comment, General Manual Muscle Testing (MMT) Assessment BUE strength symmetrical  -SD       Row Name 04/09/25 0902          Motor Skills    Motor Skills coordination;writing  -SD     Coordination finger to nose  -SD       Row Name 04/09/25 0902          Balance    Comment, Balance independent for sitting balance at EOB and SBA for standing balance using walker for support  -SD       Row Name 04/09/25 0902          Writing Skills    Writing Skills WFL  -SD     Comment, (Writing) pt able to write her name independently and legibly. Pt stated handwriting was at baseline (right hand dominant)  -SD               User Key  (r) = Recorded By, (t) = Taken By, (c) = Cosigned By      Initials Name Provider Type    Robin Escoto OTR Occupational Therapist                   Goals/Plan       [facility] Name 04/09/25 1226          Problem Specific Goal 1 (OT)    Problem Specific Goal 1 (OT) Pt to be  independent with UE HEP to address bilateral integration skills/work specific tasks (typing skills).  -SD     Time Frame (Problem Specific Goal 1, OT) by discharge  -SD     Progress/Outcome (Problem Specific Goal 1, OT) new goal  -SD       Row Name 04/09/25 1226          Therapy Assessment/Plan (OT)    Planned Therapy Interventions (OT) patient/caregiver education/training;neuromuscular control/coordination retraining  -SD               User Key  (r) = Recorded By, (t) = Taken By, (c) = Cosigned By      Initials Name Provider Type    SD Robin Basilio, OTR Occupational Therapist                   Clinical Impression       Row Name 04/09/25 1024          Pain Assessment    Pretreatment Pain Rating 0/10 - no pain  -SD     Posttreatment Pain Rating 0/10 - no pain  -SD       Row Name 04/09/25 1024          Plan of Care Review    Plan of Care Reviewed With patient;daughter  -SD     Outcome Evaluation Occupational therapy orders received per stroke protocol. Pt fell from her bed this past Sunday and had a fall on Monday. Pt c/o difficulty using her hands at work (typing as a ). MRI revealed acute pontine and right basal ganglia infarcts. Pt reported no sensory issues for BUE's. She demonstrated functional rom, strength and coordination of BUE's (finger opposition test, finger to nose with eyes open and closed, rapid alternating for BUE's and writing activity). Pt did have difficulty with typing this am (multiple errors). Pt managed bed mobility with supervision and functional transfers and mobility x 150 ft with SBA using a walker for support. Pt reports no concerns for management of basic adls. Education provided regarding bilateral integration activities to address difficulties with typing. Rec use of a walker for safety due to recent falls. Pt may benefit from  services for home safety evaluation and to address bilateral integration skills for home/work management.  -SD       Row Name 04/09/25 1024           Therapy Assessment/Plan (OT)    Patient/Family Therapy Goal Statement (OT) get my hands to work better  -SD     Criteria for Skilled Therapeutic Interventions Met (OT) yes;skilled treatment is necessary  -SD     Therapy Frequency (OT) other (see comments)  1 visit for education with home program  -SD     Predicted Duration of Therapy Intervention (OT) 1 day  -SD       Row Name 04/09/25 1024          Therapy Plan Review/Discharge Plan (OT)    Anticipated Discharge Disposition (OT) home with home health  -SD       Row Name 04/09/25 1024          Positioning and Restraints    Pre-Treatment Position in bed  -SD     Post Treatment Position chair  -SD     In Chair reclined;call light within reach;encouraged to call for assist;with family/caregiver  -SD               User Key  (r) = Recorded By, (t) = Taken By, (c) = Cosigned By      Initials Name Provider Type    Robin Escoto, OTR Occupational Therapist                   Outcome Measures       Row Name 04/09/25 1202          How much help from another is currently needed...    Putting on and taking off regular lower body clothing? 4  -SD     Bathing (including washing, rinsing, and drying) 3  -SD     Toileting (which includes using toilet bed pan or urinal) 3  -SD     Putting on and taking off regular upper body clothing 4  -SD     Taking care of personal grooming (such as brushing teeth) 4  -SD     Eating meals 4  -SD     AM-PAC 6 Clicks Score (OT) 22  -SD       Row Name 04/09/25 0805          How much help from another person do you currently need...    Turning from your back to your side while in flat bed without using bedrails? 4  -JW     Moving from lying on back to sitting on the side of a flat bed without bedrails? 3  -JW     Moving to and from a bed to a chair (including a wheelchair)? 3  -JW     Standing up from a chair using your arms (e.g., wheelchair, bedside chair)? 3  -JW     Climbing 3-5 steps with a railing? 3  -JW     To walk in hospital  room? 3  -     AM-PAC 6 Clicks Score (PT) 19  -     Highest Level of Mobility Goal 6 --> Walk 10 steps or more  -       Row Name 04/09/25 1204 04/09/25 0805       Modified David Scale    Pre-Stroke Modified David Scale 0 - No Symptoms at all.  -SD 0 - No Symptoms at all.  -    Modified [place] Scale 2 - Slight disability.  Unable to carry out all previous activities but able to look after own affairs without assistance.  difficulty with work tasks (typing)  -SD 2 - Slight disability.  Unable to carry out all previous activities but able to look after own affairs without assistance.  impacts work related activity  -      Row Name 04/09/25 0846 04/09/25 0805       Functional Assessment    Outcome Measure Options AM-PAC 6 Clicks Daily Activity (OT);Modified [place]  -SD AM-PAC 6 Clicks Basic Mobility (PT);Modified [place]  -              User Key  (r) = Recorded By, (t) = Taken By, (c) = Cosigned By      Initials Name Provider Type    SD Robin Basilio OTR Occupational Therapist    Michaelle Manzanarse, PT Physical Therapist                    Occupational Therapy Education       Title: PT OT SLP Therapies (Done)       Topic: Occupational Therapy (Done)       Point: ADL training (Done)       Learning Progress Summary            Patient Acceptance, E, VU by SD at 4/9/2025 1205    Comment: Education regarding stroke protocol and role of OT services, safety with bed mobility, transfers and mobility (rec use of walker). Education regarding activities at home to address functional deficits (typing).   Family Acceptance, E, VU by SD at 4/9/2025 1205    Comment: Education regarding stroke protocol and role of OT services, safety with bed mobility, transfers and mobility (rec use of walker). Education regarding activities at home to address functional deficits (typing).                                      User Key       Initials Effective Dates Name Provider Type Discipline    SD 06/16/21 -  Robin Basilio,  OTR Occupational Therapist OT                  OT Recommendation and Plan  Planned Therapy Interventions (OT): patient/caregiver education/training, neuromuscular control/coordination retraining  Therapy Frequency (OT): other (see comments) (1 visit for education with home program)  Plan of Care Review  Plan of Care Reviewed With: patient, daughter  Outcome Evaluation: Occupational therapy orders received per stroke protocol. Pt fell from her bed this past Sunday and had a fall on Monday. Pt c/o difficulty using her hands at work (typing as a ). MRI revealed acute pontine and right basal ganglia infarcts. Pt reported no sensory issues for BUE's. She demonstrated functional rom, strength and coordination of BUE's (finger opposition test, finger to nose with eyes open and closed, rapid alternating for BUE's and writing activity). Pt did have difficulty with typing this am (multiple errors). Pt managed bed mobility with supervision and functional transfers and mobility x 150 ft with SBA using a walker for support. Pt reports no concerns for management of basic adls. Education provided regarding bilateral integration activities to address difficulties with typing. Rec use of a walker for safety due to recent falls. Pt may benefit from  services for home safety evaluation and to address bilateral integration skills for home/work management.     Time Calculation:   Evaluation Complexity (OT)  Review Occupational Profile/Medical/Therapy History Complexity: brief/low complexity  Assessment, Occupational Performance/Identification of Deficit Complexity: 1-3 performance deficits  Clinical Decision Making Complexity (OT): problem focused assessment/low complexity  Overall Complexity of Evaluation (OT): low complexity     Time Calculation- OT       Row Name 04/09/25 0846             Time Calculation- OT    OT Start Time 0810  -SD      OT Stop Time 0835  -SD      OT Time Calculation (min) 25 min  -SD          Untimed Charges    OT Eval/Re-eval Minutes 25  -SD         Total Minutes    Untimed Charges Total Minutes 25  -SD       Total Minutes 25  -SD                User Key  (r) = Recorded By, (t) = Taken By, (c) = Cosigned By      Initials Name Provider Type    Robin Escoto OTR Occupational Therapist                  Therapy Charges for Today       Code Description Service Date Service Provider Modifiers Qty    11018259469 HC OT EVAL LOW COMPLEXITY 2 4/9/2025 Robin Basilio OTR GO 1                 RASHEED Fernandez  4/9/2025

## 2025-04-09 NOTE — PLAN OF CARE
Problem: Adult Inpatient Plan of Care  Goal: Plan of Care Review  Recent Flowsheet Documentation  Taken 4/9/2025 1024 by Robin Basilio OTR  Outcome Evaluation: Occupational therapy orders received per stroke protocol. Pt fell from her bed this past Sunday and had a fall on Monday. Pt c/o difficulty using her hands at work (typing as a ). MRI revealed acute pontine and right basal ganglia infarcts. Pt reported no sensory issues for BUE's. She demonstrated functional rom, strength and coordination of BUE's (finger opposition test, finger to nose with eyes open and closed, rapid alternating for BUE's and writing activity), yet pt did have difficulty with typing this am (multiple errors). She reported no issues with typing prior to the CVA. Pt managed bed mobility with supervision and functional transfers and mobility x 150 ft with SBA using a walker for support. Pt reports no concerns for management of basic adls. Education provided regarding bilateral integration/motor control activities to address her dystypia. Rec use of a walker for safety due to recent falls. Pt may benefit from  services for home safety evaluation and to address motor control/executive functioning for home/work management.

## 2025-04-09 NOTE — CONSULTS
"Adult Nutrition  Assessment/PES    Patient Name:  Nery Jarrell  YOB: 1955  MRN: [id number]  Admit Date:  4/8/2025    Assessment Date:  4/9/2025    Comments:  Food and Nutrition Related Knowledge Deficit related to diet to decrease risk of diabetes and Healthy Heart as evidenced by patient interview.     Reason for Assessment       Row Name 04/09/25 1606          Reason for Assessment    Reason For Assessment physician consult     Diagnosis --  acute CVA                    Nutrition/Diet History       Row Name 04/09/25 1607          Nutrition/Diet History    Typical Intake (Food/Fluid/EN/PN) high intake of sugar sweetened beverages (Hi C, regular soft drinks), favorite snack is chips, likes to add a lot of butter to foods                    Labs/Tests/Procedures/Meds       Row Name 04/09/25 1608          Labs/Procedures/Meds    Lab Results Reviewed reviewed hgba1C 6.05, chol 399, ,        Medications    Pertinent Medications Reviewed reviewed                    Physical Findings       Row Name 04/09/25 1608          Physical Findings    Exam Agreement --  pt does not meet criteria for malnutrition at this time                    Estimated/Assessed Needs - Anthropometrics       Row Name 04/09/25 1609          Anthropometrics    Age for Calculations 70     Height for Calculation 1.575 m (5' 2.01\")     Calculation Weight 91.1 kg (200 lb 13.4 oz)        Estimated/Assessed Needs    Additional Documentation [place]-St. Jeor Equation (Group);Fluid Requirements (Group);Protein Requirements (Group)        [place]-St. Jeor Equation    RMR ([place]-St. Jeor Equation) 1379.25     [place]-St. Jeor Activity Factors 1.2     Activity Factors ([place]-St. Jeor) 1655.1        Protein Requirements    Weight Used For Protein Calculations 91.1 kg (200 lb 13.4 oz)     Est Protein Requirement Amount (gms/kg) 1.0 gm protein     Estimated Protein Requirements (gms/day) 91.1        Fluid Requirements    Estimated " Fluid Requirement Method RDA Method                    Nutrition Prescription Ordered       Row Name 04/09/25 1614          Nutrition Prescription PO    Current PO Diet Regular                    Evaluation of Received Nutrient/Fluid Intake       Row Name 04/09/25 1614          PO Evaluation    Number of Meals 2     % PO Intake 88%                       Problem/Interventions:   Problem 1       Row Name 04/09/25 1615          Nutrition Diagnoses Problem 1    Problem 1 --  Food and Nutrition Related Knowledge Deficit related to diet to decrease risk of diabetes and Healthy Heart as evidenced by patient interview.                          Intervention Goal       Row Name 04/09/25 1619          Intervention Goal    General Provide information regarding MNT for treatment/condition                    Nutrition Intervention       Row Name 04/09/25 1619          Nutrition Intervention    RD/Tech Action Follow Tx progress                    Nutrition Prescription       Row Name 04/09/25 1619          Nutrition Prescription PO    PO Prescription Begin/change diet     Common Modifiers Consistent Carbohydrate                    Education/Evaluation       Row Name 04/09/25 1622          Education    Education Provided education regarding     Provided education regarding Diet rationale;Key food habit change  recommended avoiding sugar sweetened beverages/limiting added sugars/portion control/heart healthy tips        Monitor/Evaluation    Monitor I&O;PO intake;Pertinent labs;Skin status  pt receptive to education but unsure how ready pt is to make changes discussed.     Education Follow-up --  provided with handouts/RD contact info.:  Plate Method for Diabetes, Heart Healthy Label Reading Tips                     Electronically signed by:  Cheryl Salinas RD  04/09/25 16:31 EDT

## 2025-04-09 NOTE — CASE MANAGEMENT/SOCIAL WORK
Continued Stay Note   Alex     Patient Name: Nery Jarrell  MRN: [id number]  Today's Date: 4/9/2025    Admit Date: 4/8/2025    Plan: Home with [facility]   Discharge Plan       Row Name 04/09/25 1508       Plan    Plan Home with [facility]    Patient/Family in Agreement with Plan yes    Plan Comments DAVID received a call from Marika with [facility] and she states they can follow in home for PT/OT/ST. CM called and spoke with patient in room and informed her of such and she is agreeable. Plan is for patient to return home with [facility]. CM will follow.      Row Name 04/09/25 7732       Plan    Plan Comments Per Cynthia Fairbanks with [facility] they do not currently have ST and have declined.  DAVID called Marika regarding referral for PT/OT/PT and she states she will review clinicals and let me know if they can accept. CM will follow.                   Discharge Codes    No documentation.                       Annalisa Gongora RN

## 2025-04-09 NOTE — SIGNIFICANT NOTE
04/09/25 0915   Rehab Evaluation   Session Not Performed other (see comments)   Evaluation Not Performed, Comment Clinical Swallow Eval discontinued per RN. Rescreened per Stroke Dysphagia screen and passed. Diet ordered and screening consult cancelled. Will proceed with communication evaluation but currently with Sona Malcolm from neurology

## 2025-04-10 NOTE — CASE MANAGEMENT/SOCIAL WORK
Continued Stay Note   Alex     Patient Name: Nery Jarrell  MRN: [id number]  Today's Date: 4/10/2025    Admit Date: 4/8/2025    Plan: Home with [facility]    Discharge Plan       Row Name 04/10/25 1522       Plan    Final Discharge Disposition Code 06 - home with home health care    Final Note dc home with [facility]                    Discharge Codes    No documentation.                 Expected Discharge Date and Time       Expected Discharge Date Expected Discharge Time    Apr 9, 2025               Sergio Caldera RN

## 2025-04-13 LAB — METHYLMALONATE SERPL-SCNC: 212 NMOL/L (ref 0–378)

## 2025-04-15 ENCOUNTER — READMISSION MANAGEMENT (OUTPATIENT)
Dept: CALL CENTER | Facility: HOSPITAL | Age: 70
End: 2025-04-15
Payer: MEDICARE

## 2025-04-15 ENCOUNTER — RESULTS FOLLOW-UP (OUTPATIENT)
Dept: MEDSURG UNIT | Facility: HOSPITAL | Age: 70
End: 2025-04-15
Payer: MEDICARE

## 2025-04-15 NOTE — OUTREACH NOTE
Stroke Week 1 Survey      Flowsheet Row Responses   [facility] facility patient discharged from? [place]   Does the patient have one of the following disease processes/diagnoses(primary or secondary)? Stroke   Week 1 attempt successful? No   Unsuccessful attempts Attempt 1            Marni Ye Registered Nurse

## 2025-04-16 ENCOUNTER — HOSPITAL ENCOUNTER (OUTPATIENT)
Facility: HOSPITAL | Age: 70
Setting detail: OBSERVATION
Discharge: HOME HEALTH | End: 2025-04-17
Attending: EMERGENCY MEDICINE | Admitting: HOSPITALIST
Payer: MEDICARE

## 2025-04-16 ENCOUNTER — APPOINTMENT (OUTPATIENT)
Dept: CT IMAGING | Facility: HOSPITAL | Age: 70
End: 2025-04-16
Payer: MEDICARE

## 2025-04-16 ENCOUNTER — READMISSION MANAGEMENT (OUTPATIENT)
Dept: CALL CENTER | Facility: HOSPITAL | Age: 70
End: 2025-04-16
Payer: MEDICARE

## 2025-04-16 DIAGNOSIS — Z86.73 HISTORY OF RECENT STROKE: ICD-10-CM

## 2025-04-16 DIAGNOSIS — R13.10 DYSPHAGIA, UNSPECIFIED TYPE: ICD-10-CM

## 2025-04-16 DIAGNOSIS — I10 UNCONTROLLED HYPERTENSION: Primary | ICD-10-CM

## 2025-04-16 DIAGNOSIS — R47.81 SLURRED SPEECH: ICD-10-CM

## 2025-04-16 LAB
ALBUMIN SERPL-MCNC: 4.3 G/DL (ref 3.5–5.2)
ALBUMIN/GLOB SERPL: 1.4 G/DL
ALP SERPL-CCNC: 69 U/L (ref 39–117)
ALT SERPL W P-5'-P-CCNC: 18 U/L (ref 1–33)
ANION GAP SERPL CALCULATED.3IONS-SCNC: 13 MMOL/L (ref 5–15)
AST SERPL-CCNC: 22 U/L (ref 1–32)
BASOPHILS # BLD AUTO: 0.02 10*3/MM3 (ref 0–0.2)
BASOPHILS NFR BLD AUTO: 0.3 % (ref 0–1.5)
BILIRUB SERPL-MCNC: 0.4 MG/DL (ref 0–1.2)
BUN SERPL-MCNC: 19 MG/DL (ref 8–23)
BUN/CREAT SERPL: 22.1 (ref 7–25)
CALCIUM SPEC-SCNC: 10 MG/DL (ref 8.6–10.5)
CHLORIDE SERPL-SCNC: 101 MMOL/L (ref 98–107)
CO2 SERPL-SCNC: 26 MMOL/L (ref 22–29)
CREAT SERPL-MCNC: 0.86 MG/DL (ref 0.57–1)
DEPRECATED RDW RBC AUTO: 43.4 FL (ref 37–54)
EGFRCR SERPLBLD CKD-EPI 2021: 72.3 ML/MIN/1.73
EOSINOPHIL # BLD AUTO: 0.1 10*3/MM3 (ref 0–0.4)
EOSINOPHIL NFR BLD AUTO: 1.3 % (ref 0.3–6.2)
ERYTHROCYTE [DISTWIDTH] IN BLOOD BY AUTOMATED COUNT: 13.3 % (ref 12.3–15.4)
GLOBULIN UR ELPH-MCNC: 3 GM/DL
GLUCOSE SERPL-MCNC: 118 MG/DL (ref 65–99)
HCT VFR BLD AUTO: 44.4 % (ref 34–46.6)
HGB BLD-MCNC: 14.9 G/DL (ref 12–15.9)
IMM GRANULOCYTES # BLD AUTO: 0.02 10*3/MM3 (ref 0–0.05)
IMM GRANULOCYTES NFR BLD AUTO: 0.3 % (ref 0–0.5)
LYMPHOCYTES # BLD AUTO: 1.6 10*3/MM3 (ref 0.7–3.1)
LYMPHOCYTES NFR BLD AUTO: 20.2 % (ref 19.6–45.3)
MCH RBC QN AUTO: 29.6 PG (ref 26.6–33)
MCHC RBC AUTO-ENTMCNC: 33.6 G/DL (ref 31.5–35.7)
MCV RBC AUTO: 88.3 FL (ref 79–97)
MONOCYTES # BLD AUTO: 0.64 10*3/MM3 (ref 0.1–0.9)
MONOCYTES NFR BLD AUTO: 8.1 % (ref 5–12)
NEUTROPHILS NFR BLD AUTO: 5.56 10*3/MM3 (ref 1.7–7)
NEUTROPHILS NFR BLD AUTO: 69.8 % (ref 42.7–76)
NRBC BLD AUTO-RTO: 0 /100 WBC (ref 0–0.2)
PLATELET # BLD AUTO: 154 10*3/MM3 (ref 140–450)
PMV BLD AUTO: 10.8 FL (ref 6–12)
POTASSIUM SERPL-SCNC: 3.3 MMOL/L (ref 3.5–5.2)
PROT SERPL-MCNC: 7.3 G/DL (ref 6–8.5)
QT INTERVAL: 478 MS
QTC INTERVAL: 412 MS
RBC # BLD AUTO: 5.03 10*6/MM3 (ref 3.77–5.28)
SODIUM SERPL-SCNC: 140 MMOL/L (ref 136–145)
TSH SERPL DL<=0.05 MIU/L-ACNC: 1.03 UIU/ML (ref 0.27–4.2)
WBC NRBC COR # BLD AUTO: 7.94 10*3/MM3 (ref 3.4–10.8)

## 2025-04-16 PROCEDURE — 70450 CT HEAD/BRAIN W/O DYE: CPT

## 2025-04-16 PROCEDURE — 84443 ASSAY THYROID STIM HORMONE: CPT | Performed by: HOSPITALIST

## 2025-04-16 PROCEDURE — 36415 COLL VENOUS BLD VENIPUNCTURE: CPT

## 2025-04-16 PROCEDURE — 99285 EMERGENCY DEPT VISIT HI MDM: CPT

## 2025-04-16 PROCEDURE — 84132 ASSAY OF SERUM POTASSIUM: CPT | Performed by: HOSPITALIST

## 2025-04-16 PROCEDURE — 25010000002 SODIUM CHLORIDE 0.9 % WITH KCL 20 MEQ 20-0.9 MEQ/L-% SOLUTION: Performed by: HOSPITALIST

## 2025-04-16 PROCEDURE — 85025 COMPLETE CBC W/AUTO DIFF WBC: CPT | Performed by: EMERGENCY MEDICINE

## 2025-04-16 PROCEDURE — 80053 COMPREHEN METABOLIC PANEL: CPT | Performed by: EMERGENCY MEDICINE

## 2025-04-16 PROCEDURE — 93010 ELECTROCARDIOGRAM REPORT: CPT | Performed by: INTERNAL MEDICINE

## 2025-04-16 PROCEDURE — 96361 HYDRATE IV INFUSION ADD-ON: CPT

## 2025-04-16 PROCEDURE — G0378 HOSPITAL OBSERVATION PER HR: HCPCS

## 2025-04-16 PROCEDURE — 93005 ELECTROCARDIOGRAM TRACING: CPT | Performed by: EMERGENCY MEDICINE

## 2025-04-16 PROCEDURE — 96374 THER/PROPH/DIAG INJ IV PUSH: CPT

## 2025-04-16 PROCEDURE — 25010000002 HYDRALAZINE PER 20 MG: Performed by: EMERGENCY MEDICINE

## 2025-04-16 PROCEDURE — 94762 N-INVAS EAR/PLS OXIMTRY CONT: CPT

## 2025-04-16 PROCEDURE — 99214 OFFICE O/P EST MOD 30 MIN: CPT | Performed by: PSYCHIATRY & NEUROLOGY

## 2025-04-16 RX ORDER — ACETAMINOPHEN 160 MG/5ML
650 SOLUTION ORAL EVERY 4 HOURS PRN
Status: DISCONTINUED | OUTPATIENT
Start: 2025-04-16 | End: 2025-04-17 | Stop reason: HOSPADM

## 2025-04-16 RX ORDER — CLOPIDOGREL BISULFATE 75 MG/1
75 TABLET ORAL DAILY
Status: DISCONTINUED | OUTPATIENT
Start: 2025-04-16 | End: 2025-04-17 | Stop reason: HOSPADM

## 2025-04-16 RX ORDER — BISACODYL 10 MG
10 SUPPOSITORY, RECTAL RECTAL DAILY PRN
Status: DISCONTINUED | OUTPATIENT
Start: 2025-04-16 | End: 2025-04-17 | Stop reason: HOSPADM

## 2025-04-16 RX ORDER — ROSUVASTATIN CALCIUM 20 MG/1
40 TABLET, COATED ORAL DAILY
Status: DISCONTINUED | OUTPATIENT
Start: 2025-04-16 | End: 2025-04-17 | Stop reason: HOSPADM

## 2025-04-16 RX ORDER — LISINOPRIL 20 MG/1
20 TABLET ORAL DAILY
COMMUNITY
Start: 2024-05-09 | End: 2025-04-17 | Stop reason: HOSPADM

## 2025-04-16 RX ORDER — SODIUM CHLORIDE 0.9 % (FLUSH) 0.9 %
10 SYRINGE (ML) INJECTION AS NEEDED
Status: DISCONTINUED | OUTPATIENT
Start: 2025-04-16 | End: 2025-04-17 | Stop reason: HOSPADM

## 2025-04-16 RX ORDER — ESCITALOPRAM OXALATE 10 MG/1
15 TABLET ORAL NIGHTLY
Status: DISCONTINUED | OUTPATIENT
Start: 2025-04-16 | End: 2025-04-17 | Stop reason: HOSPADM

## 2025-04-16 RX ORDER — POLYETHYLENE GLYCOL 3350 17 G/17G
17 POWDER, FOR SOLUTION ORAL DAILY PRN
Status: DISCONTINUED | OUTPATIENT
Start: 2025-04-16 | End: 2025-04-17 | Stop reason: HOSPADM

## 2025-04-16 RX ORDER — HYDRALAZINE HYDROCHLORIDE 20 MG/ML
10 INJECTION INTRAMUSCULAR; INTRAVENOUS ONCE
Status: COMPLETED | OUTPATIENT
Start: 2025-04-16 | End: 2025-04-16

## 2025-04-16 RX ORDER — FAMOTIDINE 20 MG
5000 TABLET ORAL DAILY
Status: DISCONTINUED | OUTPATIENT
Start: 2025-04-16 | End: 2025-04-16

## 2025-04-16 RX ORDER — SODIUM CHLORIDE AND POTASSIUM CHLORIDE 150; 900 MG/100ML; MG/100ML
75 INJECTION, SOLUTION INTRAVENOUS CONTINUOUS
Status: DISPENSED | OUTPATIENT
Start: 2025-04-16 | End: 2025-04-17

## 2025-04-16 RX ORDER — HYDRALAZINE HYDROCHLORIDE 25 MG/1
25 TABLET, FILM COATED ORAL 2 TIMES DAILY
COMMUNITY
Start: 2025-04-16 | End: 2025-04-17 | Stop reason: HOSPADM

## 2025-04-16 RX ORDER — POTASSIUM CHLORIDE 1.5 G/1.58G
40 POWDER, FOR SOLUTION ORAL ONCE
Status: COMPLETED | OUTPATIENT
Start: 2025-04-16 | End: 2025-04-16

## 2025-04-16 RX ORDER — CHOLECALCIFEROL (VITAMIN D3) 25 MCG
5000 TABLET ORAL DAILY
Status: DISCONTINUED | OUTPATIENT
Start: 2025-04-16 | End: 2025-04-17 | Stop reason: HOSPADM

## 2025-04-16 RX ORDER — AMOXICILLIN 250 MG
2 CAPSULE ORAL 2 TIMES DAILY PRN
Status: DISCONTINUED | OUTPATIENT
Start: 2025-04-16 | End: 2025-04-17 | Stop reason: HOSPADM

## 2025-04-16 RX ORDER — AMLODIPINE BESYLATE 5 MG/1
5 TABLET ORAL
Status: DISCONTINUED | OUTPATIENT
Start: 2025-04-17 | End: 2025-04-17 | Stop reason: HOSPADM

## 2025-04-16 RX ORDER — HYDRALAZINE HYDROCHLORIDE 20 MG/ML
10 INJECTION INTRAMUSCULAR; INTRAVENOUS EVERY 4 HOURS PRN
Status: DISCONTINUED | OUTPATIENT
Start: 2025-04-16 | End: 2025-04-17 | Stop reason: HOSPADM

## 2025-04-16 RX ORDER — HYDROCHLOROTHIAZIDE 25 MG/1
25 TABLET ORAL EVERY 24 HOURS
COMMUNITY

## 2025-04-16 RX ORDER — BISACODYL 5 MG/1
5 TABLET, DELAYED RELEASE ORAL DAILY PRN
Status: DISCONTINUED | OUTPATIENT
Start: 2025-04-16 | End: 2025-04-17 | Stop reason: HOSPADM

## 2025-04-16 RX ORDER — POTASSIUM CHLORIDE 1.5 G/1.58G
40 POWDER, FOR SOLUTION ORAL EVERY 4 HOURS
Status: DISCONTINUED | OUTPATIENT
Start: 2025-04-16 | End: 2025-04-16

## 2025-04-16 RX ORDER — ONDANSETRON 2 MG/ML
4 INJECTION INTRAMUSCULAR; INTRAVENOUS EVERY 6 HOURS PRN
Status: DISCONTINUED | OUTPATIENT
Start: 2025-04-16 | End: 2025-04-17 | Stop reason: HOSPADM

## 2025-04-16 RX ORDER — AMLODIPINE BESYLATE 5 MG/1
5 TABLET ORAL ONCE
Status: COMPLETED | OUTPATIENT
Start: 2025-04-16 | End: 2025-04-16

## 2025-04-16 RX ORDER — ONDANSETRON 4 MG/1
4 TABLET, ORALLY DISINTEGRATING ORAL EVERY 6 HOURS PRN
Status: DISCONTINUED | OUTPATIENT
Start: 2025-04-16 | End: 2025-04-17 | Stop reason: HOSPADM

## 2025-04-16 RX ORDER — NITROGLYCERIN 0.4 MG/1
0.4 TABLET SUBLINGUAL
Status: DISCONTINUED | OUTPATIENT
Start: 2025-04-16 | End: 2025-04-17 | Stop reason: HOSPADM

## 2025-04-16 RX ORDER — HYDROCHLOROTHIAZIDE 12.5 MG/1
25 TABLET ORAL EVERY 24 HOURS
Status: DISCONTINUED | OUTPATIENT
Start: 2025-04-16 | End: 2025-04-17 | Stop reason: HOSPADM

## 2025-04-16 RX ORDER — ACETAMINOPHEN 325 MG/1
650 TABLET ORAL EVERY 4 HOURS PRN
Status: DISCONTINUED | OUTPATIENT
Start: 2025-04-16 | End: 2025-04-17 | Stop reason: HOSPADM

## 2025-04-16 RX ORDER — VALSARTAN 320 MG/1
320 TABLET ORAL DAILY
Status: DISCONTINUED | OUTPATIENT
Start: 2025-04-16 | End: 2025-04-17 | Stop reason: HOSPADM

## 2025-04-16 RX ORDER — SODIUM CHLORIDE 9 MG/ML
40 INJECTION, SOLUTION INTRAVENOUS AS NEEDED
Status: DISCONTINUED | OUTPATIENT
Start: 2025-04-16 | End: 2025-04-17 | Stop reason: HOSPADM

## 2025-04-16 RX ORDER — HYDRALAZINE HYDROCHLORIDE 20 MG/ML
20 INJECTION INTRAMUSCULAR; INTRAVENOUS ONCE
Status: DISCONTINUED | OUTPATIENT
Start: 2025-04-16 | End: 2025-04-16

## 2025-04-16 RX ORDER — SODIUM CHLORIDE 0.9 % (FLUSH) 0.9 %
10 SYRINGE (ML) INJECTION EVERY 12 HOURS SCHEDULED
Status: DISCONTINUED | OUTPATIENT
Start: 2025-04-16 | End: 2025-04-17 | Stop reason: HOSPADM

## 2025-04-16 RX ORDER — ASPIRIN 81 MG/1
81 TABLET ORAL DAILY
Status: DISCONTINUED | OUTPATIENT
Start: 2025-04-16 | End: 2025-04-17 | Stop reason: HOSPADM

## 2025-04-16 RX ORDER — ACETAMINOPHEN 650 MG/1
650 SUPPOSITORY RECTAL EVERY 4 HOURS PRN
Status: DISCONTINUED | OUTPATIENT
Start: 2025-04-16 | End: 2025-04-17 | Stop reason: HOSPADM

## 2025-04-16 RX ADMIN — POTASSIUM CHLORIDE 40 MEQ: 1.5 POWDER, FOR SOLUTION ORAL at 16:59

## 2025-04-16 RX ADMIN — HYDRALAZINE HYDROCHLORIDE 10 MG: 20 INJECTION INTRAMUSCULAR; INTRAVENOUS at 13:25

## 2025-04-16 RX ADMIN — Medication 500 MG: at 16:15

## 2025-04-16 RX ADMIN — Medication 10 MG: at 23:38

## 2025-04-16 RX ADMIN — SODIUM CHLORIDE AND POTASSIUM CHLORIDE 75 ML/HR: 150; 900 INJECTION, SOLUTION INTRAVENOUS at 16:22

## 2025-04-16 RX ADMIN — SODIUM CHLORIDE AND POTASSIUM CHLORIDE 75 ML/HR: 150; 900 INJECTION, SOLUTION INTRAVENOUS at 16:47

## 2025-04-16 RX ADMIN — ROSUVASTATIN CALCIUM 40 MG: 20 TABLET, FILM COATED ORAL at 16:15

## 2025-04-16 RX ADMIN — AMLODIPINE BESYLATE 5 MG: 5 TABLET ORAL at 16:49

## 2025-04-16 RX ADMIN — Medication 10 ML: at 21:02

## 2025-04-16 RX ADMIN — ASPIRIN 81 MG: 81 TABLET, COATED ORAL at 16:16

## 2025-04-16 RX ADMIN — HYDROCHLOROTHIAZIDE 25 MG: 12.5 TABLET ORAL at 16:36

## 2025-04-16 RX ADMIN — ESCITALOPRAM 15 MG: 10 TABLET, FILM COATED ORAL at 21:15

## 2025-04-16 RX ADMIN — Medication 5000 UNITS: at 16:15

## 2025-04-16 RX ADMIN — CLOPIDOGREL BISULFATE 75 MG: 75 TABLET ORAL at 16:17

## 2025-04-16 NOTE — H&P
"HISTORY AND PHYSICAL   [facility]        Patient Identification:  Name: Nery Jarrell  Age: 70 y.o.  Sex: female  :  1955  MRN: [id number]                     Primary Care Physician: Cornelio Rosa APRN    Chief Complaint: Slurred speech    History of Present Illness:   Pleasant 70-year-old female who was admitted to [place] on 2025 with an acute pontine and right basal ganglia infarct which is felt to be due to small vessel disease..  Discharge the following day.  When seen by the home nurse today she was markedly hypertensive with a pulse in the low 40s.  Slurred speech was also noted.  At present the patient only notes that her speech seems to be slower than usual.  Resident at bedside notes that occasionally there does appear to be some slurring.  He notes that her daughter noticed her speech was slow last night.  She does confirm issues with swallowing fluids.  She notes that this has been the case since her admission on 2025.  She does well with solid food but feels like water wants to \"go down the wrong pipe.\"  Reviewing her records from the last admission her EKG at that time also showed a sinus rhythm in the mid 40s.    Past Medical History:  Past Medical History:   Diagnosis Date    Abdominal wall mass 2014    Abnormal mammogram 2018    Abnormal mammogram 2015    Anxiety     Colon polyps     Colon stricture 2019    Depression     Foot deformity     GERD (gastroesophageal reflux disease)     Hyperlipidemia     Hypertension     Left breast mass 2015    OA (osteoarthritis)     Obesity     Post-menopausal     Vitamin B 12 deficiency      Past Surgical History:  Past Surgical History:   Procedure Laterality Date    BILATERAL BREAST REDUCTION Bilateral     BREAST BIOPSY Left     CARDIAC CATHETERIZATION Right 2014    MILD NONOBSTRUCTIVE CAD, NORMAL LEFT VENTRICULAR SYSTOLIC FUNCTION, LEFT VENTRICULAR HYPERTROPHY, DR. LESTER HECK AT [facility]     " SECTION N/A 1976     SECTION N/A 1979    CHOLECYSTECTOMY N/A 2014    LAPAROSCOPIC WITH CHOLANGIOGRAM, DR. TRAY NARVAEZ AT [facility]    COLONOSCOPY N/A 2003    GOOD PREP, NONSPECIFIC ILEITIS, SIGMOID DIVERTICULOSIS, DR. AURY SOTELO AT [facility]    COLONOSCOPY N/A 2019    SIGMOID STRICTURE, DIVERTICULOSIS, PELVIC ADHESIONS, DR. AURY SOTELO AT  [facility]    COLONOSCOPY W/ POLYPECTOMY N/A 2003    LARGE INTERNAL HEMORRHOIDS, 3 MM HYPERPLASTIC POLYP IN RECTUM, SCATTERED DIVERTICULOSIS, DR. ADOLFO ILSAS AT [facility]     ERCP N/A 01/10/2014    CHOLEDOCHOLITHIASIS WAS FOUND, COMPLETE REMOVAL BY BILIARY SPHINCTEROTOMY AND BALLOON EXTRACTION, DR. MARGA MARAVILLA AT [facility]    ERCP N/A 2014    PRIOR BILIARY SPHINCTEROTOMY APPEARED STENOSED, DR. MARGA MARAVILLA AT [facility]    ERCP WITH FOREIGN BODY REMOVAL N/A 2014    PRIOR BILIARY SPHINCTEROTOMY APPEARD OPEN, 1 VISIBLY OCCLUDED STENT FROM THE BILIARY TREE WAS IN THE MAJOR PAPILLA, STENT REMOVED FROM BILIARY TREE, DR. MARGA MARAVILLA AT [facility]    MOUTH SURGERY N/A 2018    REDUCTION MAMMAPLASTY        Home Meds:  (Not in a hospital admission)      Allergies:  Allergies   Allergen Reactions    Dextromethorphan Unknown - Low Severity    Elavil [Amitriptyline Hcl] Dizziness    Norvasc [Amlodipine Besylate] Swelling     Ankles swelling     Immunizations:  Immunization History   Administered Date(s) Administered    Pneumococcal Conjugate 13-Valent (PCV13) 2019    Td (TDVAX) 2004    Tdap 07/15/2013    Zostavax 2019     Social History:   Social History     Social History Narrative    Not on file     Social History     Tobacco Use    Smoking status: Former     Current packs/day: 0.00     Average packs/day: 1 pack/day for 11.0 years (11.0 ttl pk-yrs)     Types: Cigarettes     Start date:      Quit date: 2004     Years since quittin.3    Smokeless tobacco: Never   Substance Use Topics    Alcohol use: Yes      Comment: 0-1 per month     Family History:  Family History   Problem Relation Age of Onset    COPD Mother     Emphysema Mother     Heart disease Mother     Heart disease Father     Kidney disease Father     Vision loss Father     Cancer Father     Diabetes Father     Lymphoma Father     Stroke Brother     Hypertension Brother     Coronary artery disease Brother         stent    Peripheral vascular disease Brother         stent in the carotid    Diabetes Brother     No Known Problems Daughter     Drug abuse Son     Breast cancer Maternal Aunt         Review of Systems  Review of Systems   Constitutional: Negative.    HENT: Negative.     Eyes: Negative.    Respiratory: Negative.     Cardiovascular:         As per history of present illness.  No chest pain.  No palpitations.   Gastrointestinal: Negative.    Endocrine: Negative.    Genitourinary: Negative.    Musculoskeletal: Negative.    Skin: Negative.    Allergic/Immunologic: Negative.    Neurological:         As per history of present illness   Hematological: Negative.    Psychiatric/Behavioral: Negative.         Objective:  T Max 24 hrs: Temp (24hrs), Av.7 °F (36.5 °C), Min:97.7 °F (36.5 °C), Max:97.7 °F (36.5 °C)    Vitals Ranges:   Temp:  [97.7 °F (36.5 °C)] 97.7 °F (36.5 °C)  Heart Rate:  [45-50] 48  Resp:  [16] 16  BP: (169-208)/(62-86) 169/62      Exam:  Physical Exam  Constitutional:       General: She is not in acute distress.     Appearance: Normal appearance. She is obese. She is not ill-appearing, toxic-appearing or diaphoretic.   HENT:      Head: Normocephalic and atraumatic.      Right Ear: External ear normal.      Left Ear: External ear normal.      Nose: Nose normal.      Mouth/Throat:      Mouth: Mucous membranes are moist.   Eyes:      General: No scleral icterus.        Right eye: No discharge.         Left eye: No discharge.      Extraocular Movements: Extraocular movements intact.      Conjunctiva/sclera: Conjunctivae normal.    Cardiovascular:      Rate and Rhythm: Regular rhythm. Bradycardia present.      Heart sounds:      No friction rub. No gallop.      Comments: Heart rate presently about 50.  Pulmonary:      Effort: Pulmonary effort is normal.      Breath sounds: Normal breath sounds.   Abdominal:      General: Abdomen is flat. Bowel sounds are normal. There is no distension.      Palpations: Abdomen is soft. There is no mass.      Tenderness: There is no abdominal tenderness. There is no guarding or rebound.   Musculoskeletal:      Cervical back: Neck supple.      Right lower leg: No edema.      Left lower leg: No edema.   Skin:     General: Skin is warm and dry.   Neurological:      General: No focal deficit present.      Mental Status: She is alert and oriented to person, place, and time.      Comments: Her speech at present is clear.  Pace her speech is about average for [place] used [place] speaking faster than this.   Psychiatric:         Mood and Affect: Mood normal.         Behavior: Behavior normal.         Thought Content: Thought content normal.         Judgment: Judgment normal.         Data Review:  All labs and radiology reviewed.    Assessment:  Dysphagia: Per history present since 4/8/2025.  Speech therapy reevaluation.  Intermittent slurred speech: Speech therapy reevaluation.  Neurology input greatly appreciated.  Uncontrolled hypertension: It appears she was to be started on hydralazine but this is not been filled yet.  On the medication list she is on both an ACE inhibitor and an ARB's.  Also on high-dose beta-blockers.  Continue ARB's and hold ACE inhibitor.  (Generally not a helpful combination).  Add amlodipine.  Monitor closely.  Check orthostatics.  Echocardiogram earlier this month with mild concentric hypertrophy.  Bradycardia: Appears to be asymptomatic.  Patient on high-dose beta-blockers.  Decrease metoprolol dose.  Check TSH.  Obesity  class II: Encourage weight loss.  Hyperglycemia: Check A1c and monitor.  Likely IGT.  Hyperlipidemia: Considerably elevated cholesterol and LDL.  Recently started on Crestor.  Will need close follow-up.      Plan:  Please see above.  With the combination of hypertension, obesity, bradycardia we will check nocturnal oximetry's.  Discussed with patient and  at bedside.  Discussed with ER provider.    Mikael Camacho MD  4/16/2025  14:46 EDT    EMR Dragon/Transcription disclaimer:   Much of this encounter note is an electronic transcription/translation of spoken language to printed text. The electronic translation of spoken language may permit erroneous, or at times, nonsensical words or phrases to be inadvertently transcribed; Although I have reviewed the note for such errors, some may still exist.     Addendum: Reported correction to medication list.  Patient reportedly no longer on beta-blockers.  Await TSH.  Further recommendations to follow.  Electronically signed by Mikael Camacho MD, 04/16/25, 3:12 PM EDT.  Addendum: Patient reports ankle swelling with amlodipine.  A common side effect of the calcium channel blockers, especially at high doses.  I think it is worth giving this another trial but maintaining low dosage.  Electronically signed by Mikael Camacho MD, 04/16/25, 3:22 PM EDT.

## 2025-04-16 NOTE — PROGRESS NOTES
Clinical Pharmacy Services: Medication History    Nery Jarrell is a 70 y.o. female presenting to [facility] for   Chief Complaint   Patient presents with    Weakness - Generalized       She  has a past medical history of Abdominal wall mass (03/2014), Abnormal mammogram (09/04/2018), Abnormal mammogram (06/2015), Anxiety, Colon polyps, Colon stricture (07/17/2019), Depression, Foot deformity, GERD (gastroesophageal reflux disease), Hyperlipidemia, Hypertension, Left breast mass (06/2015), OA (osteoarthritis), Obesity, Post-menopausal, and Vitamin B 12 deficiency.    Allergies as of 04/16/2025 - Reviewed 04/16/2025   Allergen Reaction Noted    Dextromethorphan Unknown - Low Severity 12/24/2021    Elavil [amitriptyline hcl] Dizziness 09/21/2018    Norvasc [amlodipine besylate] Swelling 03/26/2019       Medication information was obtained from: Patient   Pharmacy and Phone Number:     Prior to Admission Medications       Prescriptions Last Dose Informant Patient Reported? Taking?    aspirin 81 MG EC tablet  Pharmacy No Yes    Take 1 tablet by mouth Daily.    clopidogrel (PLAVIX) 75 MG tablet  Pharmacy No Yes    Take 1 tablet by mouth Daily for 20 doses.    diphenhydrAMINE-acetaminophen (TYLENOL PM)  MG tablet per tablet  Self Yes Yes    Take 1 tablet by mouth At Night As Needed for Sleep.    escitalopram (LEXAPRO) 10 MG tablet  Self, Pharmacy Yes Yes    Take 1.5 tablets by mouth Every Night.    hydrALAZINE (APRESOLINE) 25 MG tablet   Yes Yes    Take 1 tablet by mouth 2 (Two) Times a Day. **Not Started**    lisinopril (PRINIVIL,ZESTRIL) 20 MG tablet  Self Yes Yes    Take 1 tablet by mouth Daily.    magnesium gluconate (MAGONATE) 500 MG tablet  Self Yes Yes    Take 27 mg by mouth every night at bedtime.    melatonin 5 MG tablet tablet  Self Yes Yes    Take 2 tablets by mouth At Night As Needed.    Omega 3 1000 MG capsule  Self Yes Yes    Take 1,000 mg by mouth Daily.    rosuvastatin (CRESTOR) 40 MG  tablet  Pharmacy No Yes    Take 1 tablet by mouth Daily.    Turmeric (QC Tumeric Complex) 500 MG capsule  Self Yes Yes    Take 2 capsules by mouth Daily With Breakfast. Indications: Inflammation    valsartan (DIOVAN) 320 MG tablet  Pharmacy No Yes    Take 1 tablet by mouth Daily. Indications: High Blood Pressure Disorder    Vitamin D, Cholecalciferol, 1000 units capsule  Self Yes Yes    Take 5 capsules by mouth Daily.    diazePAM (VALIUM) 5 MG tablet Not Taking Pharmacy No No    Take 1 tablet by mouth At Night As Needed for Anxiety or Sleep.    Patient not taking:  Reported on 4/16/2025    hydroCHLOROthiazide 25 MG tablet  Self Yes No    Take 1 tablet by mouth Daily.    metoprolol succinate XL (TOPROL-XL) 100 MG 24 hr tablet Not Taking Self, Pharmacy No No    TAKE 1 TABLET BY MOUTH ONCE DAILY AT NIGHT AT BEDTIME FOR HIGH BLOOD PRESSURE    Patient not taking:  Reported on 4/16/2025              Medication notes:     This medication list is complete to the best of my knowledge as of 4/16/2025    Please call if questions.    Clark Alarcon  Medication History Technician  [phone]    4/16/2025 15:01 EDT

## 2025-04-16 NOTE — ED NOTES
Nursing report ED to floor  Nery Jarrell  70 y.o.  female    HPI :  HPI  Stated Reason for Visit: weakness    Chief Complaint  Chief Complaint   Patient presents with    Weakness - Generalized       Admitting doctor:   Mikael Camacho MD    Admitting diagnosis:   The primary encounter diagnosis was Uncontrolled hypertension. Diagnoses of Slurred speech, Dysphagia, unspecified type, and History of recent stroke were also pertinent to this visit.    Code status:   Current Code Status       Date Active Code Status Order ID Comments User Context       Prior            Allergies:   Dextromethorphan, Elavil [amitriptyline hcl], and Norvasc [amlodipine besylate]    Isolation:   No active isolations    Intake and Output  No intake or output data in the 24 hours ending 04/16/25 1401    Weight:   There were no vitals filed for this visit.    Most recent vitals:   Vitals:    04/16/25 1301 04/16/25 1314 04/16/25 1325 04/16/25 1346   BP: (!) 194/84 (!) 208/84 179/86 169/62   BP Location:  Left arm     Patient Position:  Lying     Pulse: (!) 49 (!) 46 (!) 47 (!) 45   Resp:  16     Temp:       TempSrc:       SpO2: 94% 95%  97%       Active LDAs/IV Access:   Lines, Drains & Airways       Active LDAs       Name Placement date Placement time Site Days    Peripheral IV 04/16/25 1210 20 G Left Hand 04/16/25  1210  Hand  less than 1                    Labs (abnormal labs have a star):   Labs Reviewed   COMPREHENSIVE METABOLIC PANEL - Abnormal; Notable for the following components:       Result Value    Glucose 118 (*)     Potassium 3.3 (*)     All other components within normal limits    Narrative:     GFR Categories in Chronic Kidney Disease (CKD)      GFR Category          GFR (mL/min/1.73)    Interpretation  G1                     90 or greater         Normal or high (1)  G2                      60-89                Mild decrease (1)  G3a                   45-59                Mild to moderate decrease  G3b                   30-44                 Moderate to severe decrease  G4                    15-29                Severe decrease  G5                    14 or less           Kidney failure          (1)In the absence of evidence of kidney disease, neither GFR category G1 or G2 fulfill the criteria for CKD.    eGFR calculation 2021 CKD-EPI creatinine equation, which does not include race as a factor   CBC WITH AUTO DIFFERENTIAL - Normal   CBC AND DIFFERENTIAL    Narrative:     The following orders were created for panel order CBC & Differential.  Procedure                               Abnormality         Status                     ---------                               -----------         ------                     CBC Auto Differential[[id number]]        Normal              Final result                 Please view results for these tests on the individual orders.       EKG:   ECG 12 Lead Stroke Evaluation   Preliminary Result   HEART RATE=44  bpm   RR Jnhtfosk=0220  ms   NV Huhcvdwu=851  ms   P Horizontal Axis=-13  deg   P Front Axis=60  deg   QRSD Interval=98  ms   QT Rjvjdtji=701  ms   DToO=861  ms   QRS Axis=16  deg   T Wave Axis=  deg   - BORDERLINE ECG -   Sinus bradycardia   Abnormal R-wave progression, early transition   Borderline repolarization abnormality   Date and Time of Study:2025-04-16 12:43:19          Meds given in ED:   Medications   hydrALAZINE (APRESOLINE) injection 10 mg (10 mg Intravenous Given 4/16/25 1325)       Imaging results:  CT Head Without Contrast  Result Date: 4/16/2025  1. Evidence of previous ischemia as described, unchanged from the 04/08/2025 study. Please see additional dictation for the MRI of the brain from 04/08/2025. 2. No evidence of acute infarction or hemorrhage.  Radiation dose reduction techniques were utilized, including automated exposure control and exposure modulation based on body size.         Ambulatory status:   - standby assist    Social issues:   Social History     Socioeconomic History  "   Marital status:      Spouse name: Christopher\"    Number of children: 2    Years of education: College degree   Tobacco Use    Smoking status: Former     Current packs/day: 0.00     Average packs/day: 1 pack/day for 11.0 years (11.0 ttl pk-yrs)     Types: Cigarettes     Start date:      Quit date:      Years since quittin.3    Smokeless tobacco: Never   Vaping Use    Vaping status: Never Used   Substance and Sexual Activity    Alcohol use: Yes     Comment: 0-1 per month    Drug use: No     Comment: quit marijuana 19yrs ago    Sexual activity: Yes     Partners: Male     Birth control/protection: Post-menopausal       Peripheral Neurovascular  Peripheral Neurovascular (Adult)  Peripheral Neurovascular WDL: WDL    Neuro Cognitive  Neuro Cognitive (Adult)  Cognitive/Neuro/Behavioral WDL: .WDL except  Additional Documentation: NIH Stroke Scale (group)  NIH Stroke Scale  Interval: baseline  1a. Level of Consciousness: 0-->Alert, keenly responsive  1b. LOC Questions: 0-->Answers both questions correctly  1c. LOC Commands: 0-->Performs both tasks correctly  2. Best Gaze: 0-->Normal  3. Visual: 0-->No visual loss  4. Facial Palsy: 0-->Normal symmetrical movements  5a. Motor Arm, Left: 0-->No drift, limb holds 90 (or 45) degrees for full 10 secs  5b. Motor Arm, Right: 0-->No drift, limb holds 90 (or 45) degrees for full 10 secs  6a. Motor Leg, Left: 0-->No drift, leg holds 30 degree position for full 5 secs  6b. Motor Leg, Right: 0-->No drift, leg holds 30 degree position for full 5 secs  7. Limb Ataxia: 0-->Absent  8. Sensory: 0-->Normal, no sensory loss  9. Best Language: 1-->Mild-to-moderate aphasia, some obvious loss of fluency or facility of comprehension, without significant limitation on ideas expressed or form of expression. Reduction of speech and/or comprehension, however, makes conversation. . . (see row details)  10. Dysarthria: 0-->Normal  11. Extinction and Inattention (formerly Neglect): " 0-->No abnormality  Total (NIH Stroke Scale): 1    Learning  Learning Assessment  Learning Readiness and Ability: no barriers identified  Education Provided  Person Taught: patient, spouse  Teaching Method: verbal instruction, written material  Teaching Focus: symptom/problem overview, risk factors/triggers, diagnostic test  Education Outcome Evaluation: eager to learn, acceptance expressed, verbalizes understanding    Respiratory  Respiratory  Airway WDL: WDL  Respiratory WDL  Respiratory WDL: WDL    Abdominal Pain  Safety Interventions  Safety Precautions/Falls Reduction: family at bedside, fall reduction program maintained, assistive device/personal items within reach    Pain Assessments  Pain (Adult)  (0-10) Pain Rating: Rest: 0  (0-10) Pain Rating: Activity: 0    NIH Stroke Scale  NIH Stroke Scale  Interval: baseline  1a. Level of Consciousness: 0-->Alert, keenly responsive  1b. LOC Questions: 0-->Answers both questions correctly  1c. LOC Commands: 0-->Performs both tasks correctly  2. Best Gaze: 0-->Normal  3. Visual: 0-->No visual loss  4. Facial Palsy: 0-->Normal symmetrical movements  5a. Motor Arm, Left: 0-->No drift, limb holds 90 (or 45) degrees for full 10 secs  5b. Motor Arm, Right: 0-->No drift, limb holds 90 (or 45) degrees for full 10 secs  6a. Motor Leg, Left: 0-->No drift, leg holds 30 degree position for full 5 secs  6b. Motor Leg, Right: 0-->No drift, leg holds 30 degree position for full 5 secs  7. Limb Ataxia: 0-->Absent  8. Sensory: 0-->Normal, no sensory loss  9. Best Language: 1-->Mild-to-moderate aphasia, some obvious loss of fluency or facility of comprehension, without significant limitation on ideas expressed or form of expression. Reduction of speech and/or comprehension, however, makes conversation. . . (see row details)  10. Dysarthria: 0-->Normal  11. Extinction and Inattention (formerly Neglect): 0-->No abnormality  Total (NIH Stroke Scale): 1    Diane Couch RN  04/16/25  14:01 EDT

## 2025-04-16 NOTE — PLAN OF CARE
Pt A&O X4 able to make needs known call light in reach.  Problem: Comorbidity Management  Goal: Blood Pressure in Desired Range  Intervention: Maintain Blood Pressure Management  Recent Flowsheet Documentation  Taken 4/16/2025 1616 by Loreta Hamm LPN  Medication Review/Management: medications reviewed  Taken 4/16/2025 1537 by Loreta Hamm LPN  Medication Review/Management: medications reviewed   Goal Outcome Evaluation:

## 2025-04-16 NOTE — DISCHARGE PLACEMENT REQUEST
"Car Zheng (70 y.o. Female)       Date of Birth   1955    Social Security Number       Address   [address]  [place] [address]    Home Phone   [phone]    MRN   [id number]       Yazdanism   Judaism    Marital Status                               Admission Date   4/16/2025    Admission Type   Emergency    Admitting Provider   Mikael Camacho MD    Attending Provider   Mikael Camacho MD    Department, Room/Bed   [address], [address]       Discharge Date       Discharge Disposition       Discharge Destination                                 Attending Provider: Mikael Camacho MD    Allergies: Dextromethorphan, Elavil [Amitriptyline Hcl], Norvasc [Amlodipine Besylate]    Isolation: None   Infection: None   Code Status: CPR    Ht: 157.5 cm (62\")   Wt: 85.9 kg (189 lb 4.8 oz)    Admission Cmt: None   Principal Problem: Slurred speech [R47.81]                   Active Insurance as of 4/16/2025       Primary Coverage       Payor Plan Insurance Group Employer/Plan Group    MEDICARE MEDICARE A & B        Payor Plan Address Payor Plan Phone Number Payor Plan Fax Number Effective Dates    [address] [phone]  2/1/2019 - None Entered    [facility] [id number]         Subscriber Name Subscriber Birth Date Member ID       CAR ZHENG 1955 [id number]               Secondary Coverage       Payor Plan Insurance Group Employer/Plan Group    [employer] [place] [employer] [place] [place]       Payor Plan Address Payor Plan Phone Number Payor Plan Fax Number Effective Dates    [address] [place] [address] [phone]  2/1/2019 - None Entered    [place] [address]         Subscriber Name Subscriber Birth Date Member ID       CAR ZHENG 1955 [id number]                     Emergency Contacts        (Rel.) Home Phone Work Phone Mobile Phone    ZhengMiguel (Spouse) -- -- [phone]    Ksenia Saldaña (Daughter) -- -- [phone]    Joao Zheng (Son) -- -- " [phone]

## 2025-04-16 NOTE — ED NOTES
Pt arrives via EMS from home. States that she was hospitalized at the beginning of the month for a stroke work-up. Reports that since she was discharged she has had generalized weakness and difficulty swallowing.

## 2025-04-16 NOTE — ED PROVIDER NOTES
EMERGENCY DEPARTMENT ENCOUNTER  Room Number:  35/35  PCP: Cornelio Rosa APRN  Independent Historians: Patient and Family      HPI:  Chief Complaint: had concerns including Weakness - Generalized.     A complete HPI/ROS/PMH/PSH/SH/FH are unobtainable due to: None    Chronic or social conditions impacting patient care (Social Determinants of Health): None      Context: Nery Jarrell is a 70 y.o. female with a medical history of lumbar radiculopathy, stroke, hyperlipidemia, hypertension who presents to the ED c/o acute slurred speech, difficulty swallowing, abnormal vital signs.  The patient reports she was discharged from the hospital 1 week ago after suffering a stroke.  She states that since then she has had persistent difficulty swallowing.  She states that liquids get caught in her throat.  She states that home health is noted she has had a low heart rate for the last several days as well as an elevated blood pressure.  They noticed she is also developed slurred speech.  They noticed it this week but noted it was more pronounced since yesterday.  She has had episodes of confusion.  She has not had any falls.  Home health directed her here for further evaluation.      Review of prior external notes (non-ED) -and- Review of prior external test results outside of this encounter:  Neurology consult note 4/9/2025 notes she had acute pontine and right basal ganglia infarcts.  Likely secondary to hypertension hyperlipidemia and possible sleep apnea.    Prescription drug monitoring program review:         PAST MEDICAL HISTORY  Active Ambulatory Problems     Diagnosis Date Noted    Anxiety and depression 08/29/2016    Mixed hyperlipidemia 08/29/2016    Essential hypertension 08/29/2016    Class 1 obesity due to excess calories with serious comorbidity and body mass index (BMI) of 31.0 to 31.9 in adult 08/29/2016    B12 deficiency 08/29/2016    Avitaminosis D 08/29/2016    Arthritis 05/23/2018    Medicare annual wellness  visit, subsequent 2019    Screen for colon cancer 2019    Encounter for screening mammogram for malignant neoplasm of breast 2021    Menopause 2021    Dermatitis 2023    Lumbar radiculopathy 2023    CVA (cerebral vascular accident) 2025     Resolved Ambulatory Problems     Diagnosis Date Noted    Deformity of foot 2016    Myalgia 2016    Routine adult health maintenance 2018    Uncontrolled hypertension 2019     Past Medical History:   Diagnosis Date    Abdominal wall mass 2014    Abnormal mammogram 2018    Abnormal mammogram 2015    Anxiety     Colon polyps     Colon stricture 2019    Depression     Foot deformity     GERD (gastroesophageal reflux disease)     Hyperlipidemia     Hypertension     Left breast mass 2015    OA (osteoarthritis)     Obesity     Post-menopausal     Vitamin B 12 deficiency          PAST SURGICAL HISTORY  Past Surgical History:   Procedure Laterality Date    BILATERAL BREAST REDUCTION Bilateral     BREAST BIOPSY Left     CARDIAC CATHETERIZATION Right 2014    MILD NONOBSTRUCTIVE CAD, NORMAL LEFT VENTRICULAR SYSTOLIC FUNCTION, LEFT VENTRICULAR HYPERTROPHY, DR. LESTER HECK AT [facility]     SECTION N/A 1976     SECTION N/A 1979    CHOLECYSTECTOMY N/A 2014    LAPAROSCOPIC WITH CHOLANGIOGRAM, DR. TRAY NARVAEZ AT [facility]    COLONOSCOPY N/A 2003    GOOD PREP, NONSPECIFIC ILEITIS, SIGMOID DIVERTICULOSIS, DR. AURY SOTELO AT [facility]    COLONOSCOPY N/A 2019    SIGMOID STRICTURE, DIVERTICULOSIS, PELVIC ADHESIONS, DR. AURY SOTELO AT  [facility]    COLONOSCOPY W/ POLYPECTOMY N/A 2003    LARGE INTERNAL HEMORRHOIDS, 3 MM HYPERPLASTIC POLYP IN RECTUM, SCATTERED DIVERTICULOSIS, DR. ADOLFO ISLAS AT [facility]     ERCP N/A 01/10/2014    CHOLEDOCHOLITHIASIS WAS FOUND, COMPLETE REMOVAL BY BILIARY SPHINCTEROTOMY AND BALLOON EXTRACTION, DR. MARGA MARAVILLA AT [facility]    ERCP  "N/A 2014    PRIOR BILIARY SPHINCTEROTOMY APPEARED STENOSED, DR. MARGA MARAVILLA AT [facility]    ERCP WITH FOREIGN BODY REMOVAL N/A 2014    PRIOR BILIARY SPHINCTEROTOMY APPEARD OPEN, 1 VISIBLY OCCLUDED STENT FROM THE BILIARY TREE WAS IN THE MAJOR PAPILLA, STENT REMOVED FROM BILIARY TREE, DR. MARGA MARAVILLA AT [facility]    MOUTH SURGERY N/A 2018    REDUCTION MAMMAPLASTY           FAMILY HISTORY  Family History   Problem Relation Age of Onset    COPD Mother     Emphysema Mother     Heart disease Mother     Heart disease Father     Kidney disease Father     Vision loss Father     Cancer Father     Diabetes Father     Lymphoma Father     Stroke Brother     Hypertension Brother     Coronary artery disease Brother         stent    Peripheral vascular disease Brother         stent in the carotid    Diabetes Brother     No Known Problems Daughter     Drug abuse Son     Breast cancer Maternal Aunt          SOCIAL HISTORY  Social History     Socioeconomic History    Marital status:      Spouse name: Venu\"Miguel\"    Number of children: 2    Years of education: College degree   Tobacco Use    Smoking status: Former     Current packs/day: 0.00     Average packs/day: 1 pack/day for 11.0 years (11.0 ttl pk-yrs)     Types: Cigarettes     Start date:      Quit date:      Years since quittin.3    Smokeless tobacco: Never   Vaping Use    Vaping status: Never Used   Substance and Sexual Activity    Alcohol use: Yes     Comment: 0-1 per month    Drug use: No     Comment: quit marijuana 19yrs ago    Sexual activity: Yes     Partners: Male     Birth control/protection: Post-menopausal         ALLERGIES  Dextromethorphan, Elavil [amitriptyline hcl], and Norvasc [amlodipine besylate]      REVIEW OF SYSTEMS  Review of Systems  Included in HPI  All systems reviewed and negative except for those discussed in HPI.      PHYSICAL EXAM    I have reviewed the triage vital signs and nursing notes.    ED Triage Vitals " [04/16/25 1208]   Temp Heart Rate Resp BP SpO2   97.7 °F (36.5 °C) 50 16 (!) 195/78 95 %      Temp src Heart Rate Source Patient Position BP Location FiO2 (%)   Tympanic Monitor Sitting -- --       Physical Exam  GENERAL: Awake, alert, no acute distress  SKIN: Warm, dry  HENT: Normocephalic, atraumatic  EYES: no scleral icterus  CV: regular rhythm, regular rate  RESPIRATORY: normal effort, lungs clear  ABDOMEN: soft, nontender, nondistended  MUSCULOSKELETAL: no deformity  NEURO: alert, moves all extremities, follows commands, mild slurred speech.  Equal upper and lower extremity strength and sensation.  No facial asymmetry            LAB RESULTS  Recent Results (from the past 24 hours)   ECG 12 Lead Stroke Evaluation    Collection Time: 04/16/25 12:43 PM   Result Value Ref Range    QT Interval 478 ms    QTC Interval 412 ms   Comprehensive Metabolic Panel    Collection Time: 04/16/25 12:49 PM    Specimen: Arm, Right; Blood   Result Value Ref Range    Glucose 118 (H) 65 - 99 mg/dL    BUN 19 8 - 23 mg/dL    Creatinine 0.86 0.57 - 1.00 mg/dL    Sodium 140 136 - 145 mmol/L    Potassium 3.3 (L) 3.5 - 5.2 mmol/L    Chloride 101 98 - 107 mmol/L    CO2 26.0 22.0 - 29.0 mmol/L    Calcium 10.0 8.6 - 10.5 mg/dL    Total Protein 7.3 6.0 - 8.5 g/dL    Albumin 4.3 3.5 - 5.2 g/dL    ALT (SGPT) 18 1 - 33 U/L    AST (SGOT) 22 1 - 32 U/L    Alkaline Phosphatase 69 39 - 117 U/L    Total Bilirubin 0.4 0.0 - 1.2 mg/dL    Globulin 3.0 gm/dL    A/G Ratio 1.4 g/dL    BUN/Creatinine Ratio 22.1 7.0 - 25.0    Anion Gap 13.0 5.0 - 15.0 mmol/L    eGFR 72.3 >60.0 mL/min/1.73   CBC Auto Differential    Collection Time: 04/16/25 12:49 PM    Specimen: Arm, Right; Blood   Result Value Ref Range    WBC 7.94 3.40 - 10.80 10*3/mm3    RBC 5.03 3.77 - 5.28 10*6/mm3    Hemoglobin 14.9 12.0 - 15.9 g/dL    Hematocrit 44.4 34.0 - 46.6 %    MCV 88.3 79.0 - 97.0 fL    MCH 29.6 26.6 - 33.0 pg    MCHC 33.6 31.5 - 35.7 g/dL    RDW 13.3 12.3 - 15.4 %    RDW-SD 43.4  37.0 - 54.0 fl    MPV 10.8 6.0 - 12.0 fL    Platelets 154 140 - 450 10*3/mm3    Neutrophil % 69.8 42.7 - 76.0 %    Lymphocyte % 20.2 19.6 - 45.3 %    Monocyte % 8.1 5.0 - 12.0 %    Eosinophil % 1.3 0.3 - 6.2 %    Basophil % 0.3 0.0 - 1.5 %    Immature Grans % 0.3 0.0 - 0.5 %    Neutrophils, Absolute 5.56 1.70 - 7.00 10*3/mm3    Lymphocytes, Absolute 1.60 0.70 - 3.10 10*3/mm3    Monocytes, Absolute 0.64 0.10 - 0.90 10*3/mm3    Eosinophils, Absolute 0.10 0.00 - 0.40 10*3/mm3    Basophils, Absolute 0.02 0.00 - 0.20 10*3/mm3    Immature Grans, Absolute 0.02 0.00 - 0.05 10*3/mm3    nRBC 0.0 0.0 - 0.2 /100 WBC         RADIOLOGY  CT Head Without Contrast  Result Date: 4/16/2025  CT OF THE BRAIN WITHOUT CONTRAST 04/16/2025  HISTORY: Previous stroke. Slurred speech. Confusion.  Axial images were obtained through the brain without intravenous contrast. Previous CT of the brain dated 04/08/2025 is compared.  Again seen are areas of low-density in the anterior aspects of both basal ganglia including the anterior aspect of the bilateral corona radiata. These are also seen on the 04/08/2025 study and are consistent with areas of subacute-to-chronic infarct. There is some additional mild decreased attenuation in the periventricular white matter consistent with mild small vessel white matter ischemic disease.  No new areas of infarct, hemorrhage or midline shift are seen.      1. Evidence of previous ischemia as described, unchanged from the 04/08/2025 study. Please see additional dictation for the MRI of the brain from 04/08/2025. 2. No evidence of acute infarction or hemorrhage.  Radiation dose reduction techniques were utilized, including automated exposure control and exposure modulation based on body size.           MEDICATIONS GIVEN IN ER  Medications   hydrALAZINE (APRESOLINE) injection 10 mg (10 mg Intravenous Given 4/16/25 1325)         ORDERS PLACED DURING THIS VISIT:  Orders Placed This Encounter   Procedures    CT Head  Without Contrast    Comprehensive Metabolic Panel    CBC Auto Differential    TSH Rfx On Abnormal To Free T4    NIHSS Assessment    Nursing swallow assessment    [facility] (on-call MD unless specified) Details    ECG 12 Lead Stroke Evaluation    Initiate Observation Status    CBC & Differential         OUTPATIENT MEDICATION MANAGEMENT:  No current Epic-ordered facility-administered medications on file.     Current Outpatient Medications Ordered in Epic   Medication Sig Dispense Refill    aspirin 81 MG EC tablet Take 1 tablet by mouth Daily. 30 tablet 0    clopidogrel (PLAVIX) 75 MG tablet Take 1 tablet by mouth Daily for 20 doses. 20 tablet 0    diazePAM (VALIUM) 5 MG tablet Take 1 tablet by mouth At Night As Needed for Anxiety or Sleep. 20 tablet 0    escitalopram (LEXAPRO) 10 MG tablet Take 1.5 tablets by mouth Every Night.      metoprolol succinate XL (TOPROL-XL) 100 MG 24 hr tablet TAKE 1 TABLET BY MOUTH ONCE DAILY AT NIGHT AT BEDTIME FOR HIGH BLOOD PRESSURE (Patient taking differently: Take 1 tablet by mouth Every Night.) 90 tablet 3    rosuvastatin (CRESTOR) 40 MG tablet Take 1 tablet by mouth Daily. 90 tablet 0    valsartan (DIOVAN) 320 MG tablet Take 1 tablet by mouth Daily. Indications: High Blood Pressure Disorder 90 tablet 1    diphenhydrAMINE-acetaminophen (TYLENOL PM)  MG tablet per tablet Take 1 tablet by mouth At Night As Needed for Sleep.      hydrocortisone 2.5 % ointment Apply 1 application topically to the appropriate area as directed 2 (Two) Times a Day. 30 g 0    magnesium gluconate (MAGONATE) 500 MG tablet Take 1 tablet by mouth 2 (Two) Times a Day. Pt taking once a day      melatonin 5 MG tablet tablet Take 2 tablets by mouth Every Night.      Omega 3 1000 MG capsule Take 1,000 mg by mouth Daily.      Turmeric (QC Tumeric Complex) 500 MG capsule Take 2 capsules by mouth Daily With Breakfast. Indications: Inflammation      Vitamin D, Cholecalciferol, 1000 units capsule Take 5 capsules by mouth  Daily.           PROCEDURES  Procedures            PROGRESS, DATA ANALYSIS, CONSULTS, AND MEDICAL DECISION MAKING  All labs have been independently interpreted by me.  All radiology studies have been reviewed by me. All EKG's have been independently viewed and interpreted by me.  Discussion below represents my analysis of pertinent findings related to patient's condition, differential diagnosis, treatment plan and final disposition.    Differential diagnosis includes but is not limited to hypertensive urgency, hypertensive emergency, stroke, TIA, overmedication, beta-blockade, progressive stroke.    Clinical Scores:                       Total (NIH Stroke Scale): 1               ED Course as of 04/16/25 1422   Wed Apr 16, 2025   1231 BP(!): 195/78 [TR]   1231 Heart Rate: 50 [TR]   1308 EKG PROCEDURE    EKG time: 1243  Rhythm/Rate: Sinus bradycardia, rate 44  P waves and NH: Normal P, short NH  QRS, axis: Narrow QRS, normal axis  ST and T waves: No acute    Independently Interpreted by me  Not significantly changed compared to prior 4/8/2025   [TR]   1322 CT Head Without Contrast  My independent interpretation of the imaging study is no acute hemorrhage [TR]   1325 Discussing with Dr Lai with stroke neurology. Recommends admit, repeat MRI, admit to medicine.  [TR]   1329 I reviewed the workup and findings with the patient and family at the bedside.  Answered all questions.  They are agreeable to admission. [TR]   1343 Discussed with Dr. Camacho with [facility].  He agrees to admit. [TR]   1351 Potassium(!): 3.3 [TR]   1351 WBC: 7.94 [TR]   1351 BP: 179/86 [TR]   1421 Discussing with Dr Lai. He has evaluated the patient. He states the MRI can be canceled. [TR]      ED Course User Index  [TR] Isma Gomez MD             AS OF 14:22 EDT VITALS:    BP - 169/62  HR - (!) 45  TEMP - 97.7 °F (36.5 °C) (Tympanic)  O2 SATS - 97%    COMPLEXITY OF CARE  The patient requires admission.      DIAGNOSIS  Final diagnoses:    Uncontrolled hypertension   Slurred speech   Dysphagia, unspecified type   History of recent stroke         DISPOSITION  ED Disposition       ED Disposition   Decision to Admit    Condition   --    Comment   Level of Care: Telemetry [5]   Diagnosis: Slurred speech [[id number]]   Admitting Physician: NGOZI VELAZQUEZ [[id number]]   Attending Physician: NGOZI VELAZQUEZ [[id number]]   Is patient appropriate for Inpatient Observation Unit?: Yes [1]                  Please note that portions of this document were completed with a voice recognition program.    Note Disclaimer: At [facility], we believe that sharing information builds trust and better relationships. You are receiving this note because you recently visited [facility]. It is possible you will see health information before a provider has talked with you about it. This kind of information can be easy to misunderstand. To help you fully understand what it means for your health, we urge you to discuss this note with your provider.         Isma Gomez MD  04/16/25 1344       Isma Gomez MD  04/16/25 1358       Isma Gomez MD  04/16/25 1422

## 2025-04-16 NOTE — CONSULTS
Neurology Consult Note    Consult Date: 4/16/2025    Referring MD: Dr. Gomez    Reason for Consult I have been asked to see the patient in neurological consultation to render advice and opinion regarding dysphagia, hypertension, dizziness    Nery Jarrell is a 70 y.o. female with history of fairly refractory hypertension, hyperlipidemia, probable obstructive sleep apnea who was seen at [facility] for hand apraxia and found to have subacute left pontine and acute right subcortical strokes.  These were felt to be related to small vessel disease.  She was started on aspirin, Plavix and Crestor and Repatha was recommended.  Since discharge she endorses some persistent apraxia and mild ataxia as well as dysphagia to liquids.  She was being evaluated by home health today and they noted bradycardia to a rate of 39 as well as severe hypertension up to 200 systolic.  They recommended she come back to the emergency department to be evaluated.  Since arrival she has complained of orthostatic dizziness but otherwise no new neurologic symptoms.    Initial blood pressures on presentation were 200-210 systolic.  Heart rate was 45 on arrival.    Past Medical History:   Diagnosis Date    Abdominal wall mass 03/2014    Abnormal mammogram 09/04/2018    Abnormal mammogram 06/2015    Anxiety     Colon polyps     Colon stricture 07/17/2019    Depression     Foot deformity     GERD (gastroesophageal reflux disease)     Hyperlipidemia     Hypertension     Left breast mass 06/2015    OA (osteoarthritis)     Obesity     Post-menopausal     Vitamin B 12 deficiency        Exam  /62   Pulse (!) 45   Temp 97.7 °F (36.5 °C) (Tympanic)   Resp 16   SpO2 97%   Gen: NAD, vitals reviewed  MS: oriented x3, recent/remote memory intact, normal attention/concentration, language intact, no neglect.  CN: visual acuity grossly normal, PERRL, EOMI, no facial droop, no dysarthria  Motor: 5/5 throughout upper and lower extremities, normal  tone  Coordination: No dysmetria  Sensory: Intact to cold temperature throughout    DATA:    Lab Results   Component Value Date    GLUCOSE 118 (H) 04/16/2025    CALCIUM 10.0 04/16/2025     04/16/2025    K 3.3 (L) 04/16/2025    CO2 26.0 04/16/2025     04/16/2025    BUN 19 04/16/2025    CREATININE 0.86 04/16/2025    EGFRIFAFRI 89 09/16/2021    EGFRIFNONA 74 09/16/2021    BCR 22.1 04/16/2025    ANIONGAP 13.0 04/16/2025     Lab Results   Component Value Date    WBC 7.94 04/16/2025    HGB 14.9 04/16/2025    HCT 44.4 04/16/2025    MCV 88.3 04/16/2025     04/16/2025       Lab review: CBC, BMP reviewed, recent LDL over 300    Imaging review: I personally reviewed her outside MRI from [facility] which showed a subacute left pontine and acute right subcortical stroke.  MRA was significant for mild right carotid stenosis.  2D echo showed some mild LVH and mild LAE but negative bubble study with normal EF.  She has a Zio patch in place    Diagnoses:  Subacute lacunar strokes, left pontine and right internal capsule  Essential hypertension with hypertensive urgency  Probable obstructive sleep apnea  Symptomatic bradycardia    Pre-stroke MRS: 0  NIHSS: 0    Comment: 70-year-old female with recent multiple lacunar strokes felt to be related to severe hypertension who presents with hypertensive urgency and symptomatic bradycardia.  From a stroke standpoint she appears essentially at her baseline NIH 0.  She likely needs some speech evaluation here for persistent dysphagia but the main issue seems to be uncontrolled blood pressure and bradycardia    PLAN:  ?  Reduce metoprolol given bradycardia  Short-term goal blood pressure 140-180  ST eval  Upcoming sleep medicine appointment with Dr. Berg for suspected DARLENE  No indication to repeat brain MRI given reassuring exam    Discussed with patient, spouse, Dr. Gomez

## 2025-04-17 ENCOUNTER — APPOINTMENT (OUTPATIENT)
Dept: GENERAL RADIOLOGY | Facility: HOSPITAL | Age: 70
End: 2025-04-17
Payer: MEDICARE

## 2025-04-17 VITALS
WEIGHT: 189.3 LBS | DIASTOLIC BLOOD PRESSURE: 70 MMHG | HEART RATE: 58 BPM | BODY MASS INDEX: 34.62 KG/M2 | SYSTOLIC BLOOD PRESSURE: 153 MMHG | RESPIRATION RATE: 16 BRPM | TEMPERATURE: 97.3 F | OXYGEN SATURATION: 95 %

## 2025-04-17 PROBLEM — R00.1 BRADYCARDIA: Status: ACTIVE | Noted: 2025-04-17

## 2025-04-17 LAB
ANION GAP SERPL CALCULATED.3IONS-SCNC: 12 MMOL/L (ref 5–15)
BUN SERPL-MCNC: 14 MG/DL (ref 8–23)
BUN/CREAT SERPL: 19.2 (ref 7–25)
CALCIUM SPEC-SCNC: 9.4 MG/DL (ref 8.6–10.5)
CHLORIDE SERPL-SCNC: 106 MMOL/L (ref 98–107)
CO2 SERPL-SCNC: 23 MMOL/L (ref 22–29)
CREAT SERPL-MCNC: 0.73 MG/DL (ref 0.57–1)
EGFRCR SERPLBLD CKD-EPI 2021: 88 ML/MIN/1.73
GLUCOSE SERPL-MCNC: 91 MG/DL (ref 65–99)
HBA1C MFR BLD: 6.1 % (ref 4.8–5.6)
MAGNESIUM SERPL-MCNC: 1.8 MG/DL (ref 1.6–2.4)
POTASSIUM SERPL-SCNC: 3.5 MMOL/L (ref 3.5–5.2)
POTASSIUM SERPL-SCNC: 4 MMOL/L (ref 3.5–5.2)
SODIUM SERPL-SCNC: 141 MMOL/L (ref 136–145)
VIT B12 BLD-MCNC: 627 PG/ML (ref 211–946)

## 2025-04-17 PROCEDURE — 96361 HYDRATE IV INFUSION ADD-ON: CPT

## 2025-04-17 PROCEDURE — 97166 OT EVAL MOD COMPLEX 45 MIN: CPT

## 2025-04-17 PROCEDURE — 83735 ASSAY OF MAGNESIUM: CPT | Performed by: HOSPITALIST

## 2025-04-17 PROCEDURE — 82607 VITAMIN B-12: CPT | Performed by: HOSPITALIST

## 2025-04-17 PROCEDURE — G0378 HOSPITAL OBSERVATION PER HR: HCPCS

## 2025-04-17 PROCEDURE — 74230 X-RAY XM SWLNG FUNCJ C+: CPT

## 2025-04-17 PROCEDURE — 80048 BASIC METABOLIC PNL TOTAL CA: CPT | Performed by: HOSPITALIST

## 2025-04-17 PROCEDURE — 97530 THERAPEUTIC ACTIVITIES: CPT

## 2025-04-17 PROCEDURE — 83036 HEMOGLOBIN GLYCOSYLATED A1C: CPT | Performed by: HOSPITALIST

## 2025-04-17 PROCEDURE — 99214 OFFICE O/P EST MOD 30 MIN: CPT | Performed by: NURSE PRACTITIONER

## 2025-04-17 PROCEDURE — 92611 MOTION FLUOROSCOPY/SWALLOW: CPT

## 2025-04-17 RX ORDER — POTASSIUM CHLORIDE 1.5 G/1.58G
40 POWDER, FOR SOLUTION ORAL EVERY 4 HOURS
Status: COMPLETED | OUTPATIENT
Start: 2025-04-17 | End: 2025-04-17

## 2025-04-17 RX ORDER — AMLODIPINE BESYLATE 5 MG/1
5 TABLET ORAL
Qty: 30 TABLET | Refills: 1 | Status: SHIPPED | OUTPATIENT
Start: 2025-04-18 | End: 2025-06-17

## 2025-04-17 RX ADMIN — ASPIRIN 81 MG: 81 TABLET, COATED ORAL at 08:59

## 2025-04-17 RX ADMIN — POTASSIUM CHLORIDE 40 MEQ: 1.5 POWDER, FOR SOLUTION ORAL at 05:19

## 2025-04-17 RX ADMIN — BARIUM SULFATE 50 ML: 400 SUSPENSION ORAL at 11:12

## 2025-04-17 RX ADMIN — CLOPIDOGREL BISULFATE 75 MG: 75 TABLET ORAL at 08:59

## 2025-04-17 RX ADMIN — VALSARTAN 320 MG: 320 TABLET, FILM COATED ORAL at 08:59

## 2025-04-17 RX ADMIN — BARIUM SULFATE 55 ML: 0.81 POWDER, FOR SUSPENSION ORAL at 11:12

## 2025-04-17 RX ADMIN — Medication 500 MG: at 08:59

## 2025-04-17 RX ADMIN — AMLODIPINE BESYLATE 5 MG: 5 TABLET ORAL at 08:59

## 2025-04-17 RX ADMIN — ROSUVASTATIN CALCIUM 40 MG: 20 TABLET, FILM COATED ORAL at 08:59

## 2025-04-17 RX ADMIN — Medication 10 ML: at 09:11

## 2025-04-17 RX ADMIN — POTASSIUM CHLORIDE 40 MEQ: 1.5 POWDER, FOR SOLUTION ORAL at 00:43

## 2025-04-17 RX ADMIN — BARIUM SULFATE 5 ML: 400 PASTE ORAL at 11:12

## 2025-04-17 RX ADMIN — Medication 5000 UNITS: at 08:59

## 2025-04-17 RX ADMIN — BARIUM SULFATE 4 ML: 980 POWDER, FOR SUSPENSION ORAL at 11:12

## 2025-04-17 NOTE — MBS/VFSS/FEES
Acute Care - Speech Language Pathology   Swallow Initial Evaluation [facility]     Patient Name: Nery Jarrell  : 1955  MRN: [id number]  Today's Date: 2025               Admit Date: 2025    Visit Dx:     ICD-10-CM ICD-9-CM   1. Uncontrolled hypertension  I10 401.9   2. Slurred speech  R47.81 784.59   3. Dysphagia, unspecified type  R13.10 787.20   4. History of recent stroke  Z86.73 V12.54     Patient Active Problem List   Diagnosis    Anxiety and depression    Mixed hyperlipidemia    Essential hypertension    Class 1 obesity due to excess calories with serious comorbidity and body mass index (BMI) of 31.0 to 31.9 in adult    B12 deficiency    Avitaminosis D    Arthritis    Medicare annual wellness visit, subsequent    Screen for colon cancer    Encounter for screening mammogram for malignant neoplasm of breast    Menopause    Dermatitis    Lumbar radiculopathy    CVA (cerebral vascular accident)    Slurred speech    Bradycardia     Past Medical History:   Diagnosis Date    Abdominal wall mass 2014    Abnormal mammogram 2018    Abnormal mammogram 2015    Anxiety     Colon polyps     Colon stricture 2019    Depression     Foot deformity     GERD (gastroesophageal reflux disease)     Hyperlipidemia     Hypertension     Left breast mass 2015    OA (osteoarthritis)     Obesity     Post-menopausal     Vitamin B 12 deficiency      Past Surgical History:   Procedure Laterality Date    BILATERAL BREAST REDUCTION Bilateral     BREAST BIOPSY Left     CARDIAC CATHETERIZATION Right 2014    MILD NONOBSTRUCTIVE CAD, NORMAL LEFT VENTRICULAR SYSTOLIC FUNCTION, LEFT VENTRICULAR HYPERTROPHY, DR. LESTER HECK AT [facility]     SECTION N/A 1976     SECTION N/A 1979    CHOLECYSTECTOMY N/A 2014    LAPAROSCOPIC WITH CHOLANGIOGRAM, DR. TRAY NARVAEZ AT [facility]    COLONOSCOPY N/A 2003    GOOD PREP, NONSPECIFIC ILEITIS, SIGMOID DIVERTICULOSIS, DR. JEFFERS  DEEPAK AT [facility]    COLONOSCOPY N/A 07/17/2019    SIGMOID STRICTURE, DIVERTICULOSIS, PELVIC ADHESIONS, DR. AURY SOTELO AT  [facility]    COLONOSCOPY W/ POLYPECTOMY N/A 01/30/2003    LARGE INTERNAL HEMORRHOIDS, 3 MM HYPERPLASTIC POLYP IN RECTUM, SCATTERED DIVERTICULOSIS, DR. ADOLFO ISLAS AT [facility]     ERCP N/A 01/10/2014    CHOLEDOCHOLITHIASIS WAS FOUND, COMPLETE REMOVAL BY BILIARY SPHINCTEROTOMY AND BALLOON EXTRACTION, DR. MARGA MARAVILLA AT [facility]    ERCP N/A 01/12/2014    PRIOR BILIARY SPHINCTEROTOMY APPEARED STENOSED, DR. MARGA MARAVILLA AT [facility]    ERCP WITH FOREIGN BODY REMOVAL N/A 04/09/2014    PRIOR BILIARY SPHINCTEROTOMY APPEARD OPEN, 1 VISIBLY OCCLUDED STENT FROM THE BILIARY TREE WAS IN THE MAJOR PAPILLA, STENT REMOVED FROM BILIARY TREE, DR. MARGA MARAVILLA AT [facility]    MOUTH SURGERY N/A 08/31/2018    REDUCTION MAMMAPLASTY         SLP Recommendation and Plan  SLP Swallowing Diagnosis: mild, oral dysphagia, pharyngeal dysphagia (04/17/25 1100)  SLP Diet Recommendation: regular textures, thin liquids, no mixed consistencies (04/17/25 1100)  Recommended Precautions and Strategies: upright posture during/after eating, small bites of food and sips of liquid (04/17/25 1100)  SLP Rec. for Method of Medication Administration: meds whole, with thin liquids, with puree, as tolerated (04/17/25 1100)     Monitor for Signs of Aspiration: yes, notify SLP if any concerns (04/17/25 1100)  Recommended Diagnostics: SLE/Cog/Motor Speech Evaluation (04/17/25 1100)           Therapy Frequency (Swallow): PRN (04/17/25 1100)  Predicted Duration Therapy Intervention (Days): until discharge (04/17/25 1100)  Oral Care Recommendations: Oral Care BID/PRN (04/17/25 1100)                                        Outcome Evaluation: VFSS completed. Trace aspiration mixed with strong cough. Intermittent difficulty with swallow initiation 2/2 mild lingual weakness. SLP recs regular and thins, no mixed. Meds as stephen, may need puree/pudding.  Continued speech therapy at [facility] to address mild dysphagia. Suspect swallow function will fully recover with intervention. Of note, patient c/o difficulty with typing court language. She indicates it's more of a disconnect between her brain with recalling/typing the symbol vs an hand weakness/ataxia. Will need to assess further in out patient or home health setting.      SWALLOW EVALUATION (Last 72 Hours)       SLP Adult Swallow Evaluation       Row Name 04/17/25 1100                   Rehab Evaluation    Document Type evaluation  -SH        Patient Effort excellent  -           General Information    Patient Profile Reviewed yes  -SH        Pertinent History Of Current Problem Subacute last and internal capsule stroke with dysphagia with liquids since CVA  -        Current Method of Nutrition NPO  -        Precautions/Limitations, Vision WFL;for purposes of eval  -        Precautions/Limitations, Hearing WFL;for purposes of eval  -SH        Prior Level of Function-Communication WFL  -        Prior Level of Function-Swallowing no diet consistency restrictions  -        Plans/Goals Discussed with patient;agreed upon  -        Barriers to Rehab medically complex  -SH           Pain    Pretreatment Pain Rating 0/10 - no pain  -SH        Posttreatment Pain Rating 0/10 - no pain  -SH           Oral Motor Structure and Function    Dentition Assessment natural, present and adequate;other (see comments)  implants  -           Oral Musculature and Cranial Nerve Assessment    Oral Motor General Assessment lingual impairment  -SH        Velar Impairment, Detail, Cranial Nerves X, XI (Vagus and Accessory) other (see comments)  slight assymetry  -SH           MBS/VFSS Interpretation    VFSS Summary Patient presents with mild oropharyngeal dysphagia   characterized by delayed swallow initiation, swallow mistiming, lingual weakness, and difficulty propelling bolus to initiate a swallow. Spill to the valleculae  before the swallow with thins via cup. Intermittent spill to the pyriforms before the swallow with thins via straw with transient penetration during the swallow. Swallow triggered at the valleculae with puree. Pt cleared mild-moderate residue with an additional swallow. Rotary mastication with mech soft and regular solids. Spill to the pyriforms before the swallow with thin portion of mixed. With penetration before the second swallow and aspiration during the swallow with strong cough. Slow swallow initiation with puree, swallow triggered at the valleculae. No residue post swallow.  -           SLP Communication to Radiology    Summary Statement Melissa Sr, Radiology APRN, present. Trace aspiration with soft solids with strong cough. Difficulty propelling bolus to initiate a swallow intermittently across trials.  -           SLP Evaluation Clinical Impression    SLP Swallowing Diagnosis mild;oral dysphagia;pharyngeal dysphagia  -           Recommendations    Therapy Frequency (Swallow) PRN  -        Predicted Duration Therapy Intervention (Days) until discharge  -        SLP Diet Recommendation regular textures;thin liquids;no mixed consistencies  -        Recommended Diagnostics SLE/Cog/Motor Speech Evaluation  -        Recommended Precautions and Strategies upright posture during/after eating;small bites of food and sips of liquid  -        Oral Care Recommendations Oral Care BID/PRN  -        SLP Rec. for Method of Medication Administration meds whole;with thin liquids;with puree;as tolerated  -        Monitor for Signs of Aspiration yes;notify SLP if any concerns  -                  User Key  (r) = Recorded By, (t) = Taken By, (c) = Cosigned By      Initials Name Effective Dates     Donna Lanier, SLP 01/05/24 -                     EDUCATION  The patient has been educated in the following areas:   Dysphagia (Swallowing Impairment).                Time Calculation:    Time Calculation-  SLP       Row Name 04/17/25 1512             Time Calculation- SLP    SLP Start Time 1100  -      SLP Received On 04/17/25  -                User Key  (r) = Recorded By, (t) = Taken By, (c) = Cosigned By      Initials Name Provider Type    Donna Buckley SLP Speech and Language Pathologist                    Therapy Charges for Today       Code Description Service Date Service Provider Modifiers Qty    [id number] HC ST MOTION FLUORO EVAL SWALLOW 6 4/17/2025 Donna Lanier SLP GN 1                 BARRETT Johnson  4/17/2025

## 2025-04-17 NOTE — DISCHARGE SUMMARY
Patient Name: Nery Jarrell  : 1955  MRN: [id number]    Date of Admission: 2025  Date of Discharge:  2025  Primary Care Physician: Cornelio Rosa APRN      Chief Complaint:   Weakness - Generalized      Discharge Diagnoses     Active Hospital Problems    Diagnosis  POA    **Slurred speech [R47.81]  Yes    Bradycardia [R00.1]  Yes    Essential hypertension [I10]  Yes      Resolved Hospital Problems   No resolved problems to display.        Hospital Course     Ms. Jarrell is a 70 y.o. female with a history of HTN, HLD, OA, GERD, & most recent acute pontine & right basal ganglia infarct felt to be due to small vessel disease obesity who presented to [facility] initially complaining of generalized weakness.  Please see the admitting history and physical for further details.  She was found to have bradycardia & slurred speech and was admitted to the hospital for further evaluation and treatment.      Family also concerned for swallowing difficulties & home health nurse concerned about markedly elevated BP & low pulse rate of 40s with slurred speech.    Neurology recommended a reduced antihypertensive regimen with short-term goal -180; therefore, medicine discontinued BB, ACEi & continued ARB.  Hydralazine discontinued as well.  Amlodipine initiated with tolerance observed in hospital & HCTZ continued as well.  BP & HR improved & patient agreed with plan for close follow-up PCP for continued BP & HR monitoring.    SLP performed VFSS & recommend regular diet with thin liquids (no mixed) & consider puree / pudding for medications as tolerated.      Of note, patient c/o difficulty with typing senography (court language) given more of a disconnect between brain with recalling/typing the symbol versus hand weakness/ataxia. Will need to assess further in outpatient or home health setting which may be challenging to target as being a  is a unique skill/language.    Patient  tolerating activity & diet appears medically stable for discharge home with family & established home health services & agrees with discharge on 4/17/210205.    Day of Discharge       Physical Exam:  Temp:  [97.3 °F (36.3 °C)-97.7 °F (36.5 °C)] 97.3 °F (36.3 °C)  Heart Rate:  [49-65] 58  Resp:  [16-17] 16  BP: (125-177)/(56-85) 153/70  Body mass index is 34.62 kg/m².    Physical Exam  Constitutional:       General: She is not in acute distress.     Appearance: She is obese. She is not toxic-appearing.   HENT:      Head: Normocephalic and atraumatic.   Eyes:      Extraocular Movements: Extraocular movements intact.      Conjunctiva/sclera: Conjunctivae normal.   Cardiovascular:      Rate and Rhythm: Bradycardia present.      Heart sounds: Normal heart sounds.   Pulmonary:      Effort: Pulmonary effort is normal.      Breath sounds: Normal breath sounds.   Abdominal:      General: Bowel sounds are normal.      Palpations: Abdomen is soft.   Musculoskeletal:      Cervical back: Normal range of motion and neck supple.      Right lower leg: No edema.      Left lower leg: No edema.   Skin:     General: Skin is warm and dry.   Neurological:      General: No focal deficit present.      Mental Status: She is alert and oriented to person, place, and time.      Cranial Nerves: No cranial nerve deficit.   Psychiatric:         Thought Content: Thought content normal.         Consultants     Consult Orders (all) (From admission, onward)       Start     Ordered    04/16/25 1328  [facility] (on-call MD unless specified) Details  Once        Specialty:  Hospitalist  Provider:  Mikael Camacho MD    04/16/25 1327                  Procedures     * Surgery not found *    Imaging Results (All)       Procedure Component Value Units Date/Time    FL Video Swallow Single Contrast [[id number]] Resulted: 04/17/25 1054     Updated: 04/17/25 1112    CT Head Without Contrast [[id number]] Collected: 04/16/25 1357     Updated: 04/16/25 1700     Narrative:      CT OF THE BRAIN WITHOUT CONTRAST 04/16/2025     HISTORY: Previous stroke. Slurred speech. Confusion.     Axial images were obtained through the brain without intravenous  contrast. Previous CT of the brain dated 04/08/2025 is compared.     Again seen are areas of low-density in the anterior aspects of both  basal ganglia including the anterior aspect of the bilateral corona  radiata. These are also seen on the 04/08/2025 study and are consistent  with areas of subacute-to-chronic infarct. There is some additional mild  decreased attenuation in the periventricular white matter consistent  with mild small vessel white matter ischemic disease.     No new areas of infarct, hemorrhage or midline shift are seen.       Impression:      1. Evidence of previous ischemia as described, unchanged from the  04/08/2025 study. Please see additional dictation for the MRI of the  brain from 04/08/2025.  2. No evidence of acute infarction or hemorrhage.     Radiation dose reduction techniques were utilized, including automated  exposure control and exposure modulation based on body size.        This report was finalized on 4/16/2025 4:57 PM by Dr. Thad Monique M.D on Workstation: [id number]             Results for orders placed during the hospital encounter of 05/16/23    Duplex Renal Artery - Bilateral Complete CAR    Interpretation Summary    Normal right renal artery.    Normal left renal artery.    Results for orders placed during the hospital encounter of 04/08/25    Adult Transthoracic Echo Complete W/ Cont if Necessary Per Protocol    Interpretation Summary    Left ventricular systolic function is normal. Calculated left ventricular EF = 58.1%    Left ventricular wall thickness is consistent with mild concentric hypertrophy.    Left ventricular diastolic function was normal.    Left atrial volume is mildly increased.    Saline test results are negative.    Estimated right ventricular systolic pressure  "from tricuspid regurgitation is normal (<35 mmHg). Calculated right ventricular systolic pressure from tricuspid regurgitation is 9 mmHg.    Pertinent Labs     Results from last 7 days   Lab Units 04/16/25  1249   WBC 10*3/mm3 7.94   HEMOGLOBIN g/dL 14.9   PLATELETS 10*3/mm3 154     Results from last 7 days   Lab Units 04/17/25  0531 04/16/25  2338 04/16/25  1249   SODIUM mmol/L 141  --  140   POTASSIUM mmol/L 4.0 3.5 3.3*   CHLORIDE mmol/L 106  --  101   CO2 mmol/L 23.0  --  26.0   BUN mg/dL 14  --  19   CREATININE mg/dL 0.73  --  0.86   GLUCOSE mg/dL 91  --  118*   EGFR mL/min/1.73 88.0  --  72.3     Results from last 7 days   Lab Units 04/16/25  1249   ALBUMIN g/dL 4.3   BILIRUBIN mg/dL 0.4   ALK PHOS U/L 69   AST (SGOT) U/L 22   ALT (SGPT) U/L 18     Results from last 7 days   Lab Units 04/17/25  0531 04/16/25  1249   CALCIUM mg/dL 9.4 10.0   ALBUMIN g/dL  --  4.3   MAGNESIUM mg/dL 1.8  --                Invalid input(s): \"LDLCALC\"          Test Results Pending at Discharge     Pending Results       Procedure [Order ID] Specimen - Date/Time    FL Video Swallow Single Contrast [[id number]] Resulted: 04/17/25 1054     Updated: 04/17/25 1112              Discharge Details        Discharge Medications        New Medications        Instructions Start Date   amLODIPine 5 MG tablet  Commonly known as: NORVASC   5 mg, Oral, Every 24 Hours Scheduled   Start Date: April 18, 2025            Continue These Medications        Instructions Start Date   Aspirin EC Adult Low Dose 81 MG EC tablet  Generic drug: aspirin   81 mg, Oral, Daily      clopidogrel 75 MG tablet  Commonly known as: PLAVIX   75 mg, Oral, Daily      diphenhydrAMINE-acetaminophen  MG tablet per tablet  Commonly known as: TYLENOL PM   1 tablet, Nightly PRN      escitalopram 10 MG tablet  Commonly known as: LEXAPRO   15 mg, Nightly      hydroCHLOROthiazide 25 MG tablet   25 mg, Oral, Every 24 Hours      magnesium gluconate 500 MG tablet  Commonly known " as: MAGONATE   27 mg, Every Night at Bedtime      melatonin 5 MG tablet tablet   10 mg, Nightly PRN      Omega 3 1000 MG capsule   1,000 mg, Daily      QC Tumeric Complex 500 MG capsule  Generic drug: Turmeric   2 capsules, Daily With Breakfast      rosuvastatin 40 MG tablet  Commonly known as: CRESTOR   40 mg, Oral, Daily      valsartan 320 MG tablet  Commonly known as: DIOVAN   320 mg, Oral, Daily      Vitamin D (Cholecalciferol) 25 MCG (1000 UT) capsule   5,000 Units, Daily             Stop These Medications      hydrALAZINE 25 MG tablet  Commonly known as: APRESOLINE     lisinopril 20 MG tablet  Commonly known as: PRINIVIL,ZESTRIL     metoprolol succinate  MG 24 hr tablet  Commonly known as: TOPROL-XL            ASK your doctor about these medications        Instructions Start Date   diazePAM 5 MG tablet  Commonly known as: VALIUM   5 mg, Oral, Nightly PRN               Allergies   Allergen Reactions    Dextromethorphan Unknown - Low Severity    Elavil [Amitriptyline Hcl] Dizziness    Norvasc [Amlodipine Besylate] Swelling     Ankles swelling       Discharge Disposition:  [place]-Health [place] [facility] [place]      Discharge Diet:  Diet Order   Procedures    Diet: Regular/House; No Mixed Consistencies; Fluid Consistency: Thin (IDDSI 0)       Discharge Activity:   Activity Instructions       Activity as Tolerated              CODE STATUS:    Code Status and Medical Interventions: CPR (Attempt to Resuscitate); Full Support   Ordered at: 04/16/25 1519     Code Status (Patient has no pulse and is not breathing):    CPR (Attempt to Resuscitate)     Medical Interventions (Patient has pulse or is breathing):    Full Support       Future Appointments   Date Time Provider Department Center   5/19/2025  1:00 PM Liu Berg MD [facility] None   7/8/2025  2:30 PM Sona Malcolm APRN [facility]     Additional Instructions for the Follow-ups that You Need to Schedule       Discharge Follow-up with PCP   As directed        Currently Documented PCP:    Cornelio Rosa APRN    PCP Phone Number:    [phone]     Follow Up Details: Please call to schedule a 1-2 weeks follow-up appointment with PCP; BP               Contact information for follow-up providers       Cornelio Rosa APRN Follow up on 4/18/2025.    Specialty: Nurse Practitioner  Why: Appointment will be Friday, April 18, 2025 at 11:20am  Contact information:  [address]  [facility] [address]  [phone]               Cornelio Rosa APRN .    Specialty: Nurse Practitioner  Why: Please call to schedule a 1-2 weeks follow-up appointment with PCP; BP  Contact information:  [address]  [facility] [address]  [phone]                       Contact information for after-discharge care       Home Medical Care       [facility] .    Service: Home Health Services  Contact information:  [address]  [facility] [address] [phone]                                   Additional Instructions for the Follow-ups that You Need to Schedule       Discharge Follow-up with PCP   As directed       Currently Documented PCP:    Cornelio Rosa APRN    PCP Phone Number:    [phone]     Follow Up Details: Please call to schedule a 1-2 weeks follow-up appointment with PCP; BP            Time Spent on Discharge:  Greater than 30 minutes      FREEDOM Barraza  [facility]  04/17/25  14:28 EDT

## 2025-04-17 NOTE — SIGNIFICANT NOTE
04/17/25 1602   OTHER   Discipline physical therapist   Therapy Assessment/Plan (PT)   Criteria for Skilled Interventions Met (PT) no;no problems identified which require skilled intervention  (PT observed pt ambulate ad emigdio/SBA in room. Pt plans to return home w family assist and HHC. Will defer formal acute care eval. noted plans for DC today.)

## 2025-04-17 NOTE — PLAN OF CARE
Goal Outcome Evaluation:  Plan of Care Reviewed With: patient           Outcome Evaluation: Orders received and chart reviewed. Will schedule VFSS for 1100 d/t complaint of dysphagia with liquids since previous stroke.

## 2025-04-17 NOTE — PROGRESS NOTES
DOS: 2025  NAME: Nery Jarrell   : 1955  PCP: Cornelio Rosa APRN  Chief Complaint   Patient presents with    Weakness - Generalized     Stroke    Subjective: No new symptoms overnight.  Notes speech and gait are slow but no focal weakness. Pt seen in follow up today, however the problem is new to the examiner.      Objective:  Vital signs: /70   Pulse 58   Temp 97.3 °F (36.3 °C)   Resp 16   Wt 85.9 kg (189 lb 4.8 oz)   SpO2 95%   BMI 34.62 kg/m²       GEN: NAD, v/s reviewed  HEENT: Normocephalic, atraumatic   COR: RRR  Resp: Even and unlabored, clear to auscultation bilaterally  Extremities: Normal, no edema    Neurological:   MS: AOx3.  Intact recent/remote memory, attention/concentration.  Language intact.  No neglect.  CN: Visual fields intact bilaterally, PERRL, extraocular movements intact, normal facial sensation, no facial droop, tongue midline, no dysarthria  Motor: 5/5 in all extremities, normal tone  Sensory: Intact to LT in all extremities.   Coordination: Normal FTN bilaterally    Scheduled Meds:amLODIPine, 5 mg, Oral, Q24H  aspirin, 81 mg, Oral, Daily  cholecalciferol, 5,000 Units, Oral, Daily  clopidogrel, 75 mg, Oral, Daily  escitalopram, 15 mg, Oral, Nightly  hydroCHLOROthiazide, 25 mg, Oral, Q24H  magnesium (as) gluconate, 500 mg, Oral, Daily  rosuvastatin, 40 mg, Oral, Daily  sodium chloride, 10 mL, Intravenous, Q12H  valsartan, 320 mg, Oral, Daily      Continuous Infusions:   PRN Meds:.  acetaminophen **OR** acetaminophen **OR** acetaminophen    senna-docusate sodium **AND** polyethylene glycol **AND** bisacodyl **AND** bisacodyl    Calcium Replacement - Follow Nurse / BPA Driven Protocol    hydrALAZINE    Magnesium Standard Dose Replacement - Follow Nurse / BPA Driven Protocol    melatonin    nitroglycerin    ondansetron ODT **OR** ondansetron    Phosphorus Replacement - Follow Nurse / BPA Driven Protocol    Potassium Replacement - Follow Nurse / BPA Driven Protocol     sodium chloride    sodium chloride      Laboratory results:  Lab Results   Component Value Date    GLUCOSE 91 04/17/2025    CALCIUM 9.4 04/17/2025     04/17/2025    K 4.0 04/17/2025    CO2 23.0 04/17/2025     04/17/2025    BUN 14 04/17/2025    CREATININE 0.73 04/17/2025    EGFRIFAFRI 89 09/16/2021    EGFRIFNONA 74 09/16/2021    BCR 19.2 04/17/2025    ANIONGAP 12.0 04/17/2025     Lab Results   Component Value Date    WBC 7.94 04/16/2025    HGB 14.9 04/16/2025    HCT 44.4 04/16/2025    MCV 88.3 04/16/2025     04/16/2025     Lab Results   Component Value Date    CHOL 399 (H) 04/09/2025    CHOL 421 (H) 04/08/2025     Lab Results   Component Value Date    HDL 55 04/09/2025    HDL 57 04/08/2025    HDL 79 02/13/2023     Lab Results   Component Value Date     (H) 04/09/2025     (H) 04/08/2025     (H) 02/13/2023     Lab Results   Component Value Date    TRIG 181 (H) 04/09/2025    TRIG 210 (H) 04/08/2025    TRIG 96 02/13/2023         Lab 04/17/25  0531   HEMOGLOBIN A1C 6.10*   Labs: TSH 1.03, B12 627, folate greater than 20, , hemoglobin A1c 6.1%  Review and interpretation of imaging:  CT Head Without Contrast  Result Date: 4/16/2025  1. Evidence of previous ischemia as described, unchanged from the 04/08/2025 study. Please see additional dictation for the MRI of the brain from 04/08/2025. 2. No evidence of acute infarction or hemorrhage.  Radiation dose reduction techniques were utilized, including automated exposure control and exposure modulation based on body size.   This report was finalized on 4/16/2025 4:57 PM by Dr. Thad Monique M.D on Workstation: [id number]        Impression:  69-year-old female with hypertension, hyperlipidemia, suspected DARLENE who was recently admitted to [facility] for hand apraxia found to have subacute left pontine acute right basal ganglia strokes which were felt secondary to small vessel disease.  She was started on DAPT with low-dose  aspirin and Plavix, as well as Crestor and Repatha was recommended.  Since discharge she has had persistent apraxia and mild ataxia as well as dysphagia to liquids.  She is being evaluated by home health when they noted bradycardia with a heart rate of 39 as well as severe hypertension up to 200 and slurred speech that she was recommended to come to the ED for further evaluation.  She has also complained of orthostatic dizziness presenting here and initial SBPs were 200-210, heart rate 45.  CT head stable, MRI brain not felt necessary as no focal findings.  SBP ranging 130s to 170s today, heart rate in 50s.    Diagnosis:  Subacute lacunar strokes, left pontine and right internal capsule  Hypertension with hypertensive urgency  Probable DARLENE  Bradycardia      Plan:  Primary team adjusting  medications, currently metoprolol is being held  Continue with the plan of treating with DAPT for 21 days followed by low-dose aspirin  Continue Crestor  Can be discharged from neurology standpoint once medically stable  She has outpatient neurology follow-up with FREEDOM Redd on 7/8 and sleep medicine consultation on 5/19.  D/W Dr Lai today. Will sign off but please call with questions/concerns.

## 2025-04-17 NOTE — PLAN OF CARE
The pt was admitted to [facility] 2/2 to generalied weakness and difficulty swallowing. She was hospitalized earlier this month for a stroke work up and found to have pontine and right basal ganglia infarcts. Pmhx significant for hypertension, hyperlipidemia, probable obstructive sleep apnea. The pt lives at home with her spouse who reports independence with ADL's and mobility using no AD. The pt reports increased difficulty completing home and work tasks since her stroke due to cognitive and strength deficits. Today, the pt completed self feeding after set-up. She stood with CGA and mobilized x8 minutes with slow pacing. No LOB. She reports no difficulty with toileting or grooming since her admission - SBA has been provided by family members. She is interested in d/c home with HH with a transition to OP OT/PT/SLP. Info was provided to the patient on the [facility] Neuro therapy clinic.

## 2025-04-17 NOTE — PLAN OF CARE
Goal Outcome Evaluation:  Plan of Care Reviewed With: patient           Outcome Evaluation:     VFSS completed. Trace aspiration mixed with strong cough. Intermittent difficulty with swallow initiation 2/2 mild lingual weakness. SLP recs regular and thins, no mixed. Meds as stephen, may need puree/pudding. Continued speech therapy at D/C to address mild dysphagia. Suspect swallow function will fully recover with intervention.     Of note, patient c/o difficulty with typing senography (court language). She indicates it's more of a disconnect between her brain with recalling/typing the symbol vs an hand weakness/ataxia. Will need to assess further in out patient or home health setting. May be challenging to target as being a  is a unique skill/language

## 2025-04-17 NOTE — CASE MANAGEMENT/SOCIAL WORK
Continued Stay Note  [facility]     Patient Name: Nery Jarrell  MRN: [id number]  Today's Date: 4/17/2025    Admit Date: 4/16/2025    Plan: Home with spouse and 14 yr old granddtr and current w/ [facility].   Discharge Plan       Row Name 04/17/25 1637       Plan    Plan Comments DC orders noted.  Marika/ Jose  notified of DC today.    Final Discharge Disposition Code 06 - home with home health care    Final Note DC home with continued [facility] ..........Millie KUMAR/ CCP                   Discharge Codes    No documentation.                 Expected Discharge Date and Time       Expected Discharge Date Expected Discharge Time    Apr 17, 2025               Millie Cueva RN

## 2025-04-17 NOTE — OUTREACH NOTE
Stroke Week 1 Survey      Flowsheet Row Responses   [facility] patient discharged from? [place]   Does the patient have one of the following disease processes/diagnoses(primary or secondary)? Stroke   Week 1 attempt successful? No   Unsuccessful attempts Attempt 2   Revoke Readmitted            LILLIAN ADAMS - Registered Nurse

## 2025-04-17 NOTE — THERAPY EVALUATION
Patient Name: Nery Jarrell  : 1955    MRN: [id number]                              Today's Date: 2025       Admit Date: 2025    Visit Dx:     ICD-10-CM ICD-9-CM   1. Uncontrolled hypertension  I10 401.9   2. Slurred speech  R47.81 784.59   3. Dysphagia, unspecified type  R13.10 787.20   4. History of recent stroke  Z86.73 V12.54     Patient Active Problem List   Diagnosis    Anxiety and depression    Mixed hyperlipidemia    Essential hypertension    Class 1 obesity due to excess calories with serious comorbidity and body mass index (BMI) of 31.0 to 31.9 in adult    B12 deficiency    Avitaminosis D    Arthritis    Medicare annual wellness visit, subsequent    Screen for colon cancer    Encounter for screening mammogram for malignant neoplasm of breast    Menopause    Dermatitis    Lumbar radiculopathy    CVA (cerebral vascular accident)    Slurred speech    Bradycardia     Past Medical History:   Diagnosis Date    Abdominal wall mass 2014    Abnormal mammogram 2018    Abnormal mammogram 2015    Anxiety     Colon polyps     Colon stricture 2019    Depression     Foot deformity     GERD (gastroesophageal reflux disease)     Hyperlipidemia     Hypertension     Left breast mass 2015    OA (osteoarthritis)     Obesity     Post-menopausal     Vitamin B 12 deficiency      Past Surgical History:   Procedure Laterality Date    BILATERAL BREAST REDUCTION Bilateral     BREAST BIOPSY Left     CARDIAC CATHETERIZATION Right 2014    MILD NONOBSTRUCTIVE CAD, NORMAL LEFT VENTRICULAR SYSTOLIC FUNCTION, LEFT VENTRICULAR HYPERTROPHY, DR. LESTER HECK AT [facility]     SECTION N/A 1976     SECTION N/A 1979    CHOLECYSTECTOMY N/A 2014    LAPAROSCOPIC WITH CHOLANGIOGRAM, DR. TRAY NARVAEZ AT [facility]    COLONOSCOPY N/A 2003    GOOD PREP, NONSPECIFIC ILEITIS, SIGMOID DIVERTICULOSIS, DR. AURY SOTELO AT [facility]    COLONOSCOPY N/A 2019    SIGMOID STRICTURE,  DIVERTICULOSIS, PELVIC ADHESIONS, DR. AURY SOTELO AT  [facility]    COLONOSCOPY W/ POLYPECTOMY N/A 01/30/2003    LARGE INTERNAL HEMORRHOIDS, 3 MM HYPERPLASTIC POLYP IN RECTUM, SCATTERED DIVERTICULOSIS, DR. ADOLFO ISLAS AT [facility]     ERCP N/A 01/10/2014    CHOLEDOCHOLITHIASIS WAS FOUND, COMPLETE REMOVAL BY BILIARY SPHINCTEROTOMY AND BALLOON EXTRACTION, DR. MARGA MARAVILLA AT [facility]    ERCP N/A 01/12/2014    PRIOR BILIARY SPHINCTEROTOMY APPEARED STENOSED, DR. MARGA MARAVILLA AT [facility]    ERCP WITH FOREIGN BODY REMOVAL N/A 04/09/2014    PRIOR BILIARY SPHINCTEROTOMY APPEARD OPEN, 1 VISIBLY OCCLUDED STENT FROM THE BILIARY TREE WAS IN THE MAJOR PAPILLA, STENT REMOVED FROM BILIARY TREE, DR. MARGA MARAVILLA AT [facility]    MOUTH SURGERY N/A 08/31/2018    REDUCTION MAMMAPLASTY        General Information       Row Name 04/17/25 1407          OT Time and Intention    Subjective Information no complaints  -RB     Document Type evaluation  -RB     Mode of Treatment individual therapy;occupational therapy  -RB     Patient Effort good  -RB       Row Name 04/17/25 1407          General Information    Patient Profile Reviewed yes  -RB     Prior Level of Function independent:;ADL's;transfer  increased difficulty since her stroke on April 9,  OT has not evaluated yet per pt and family  -RB     Existing Precautions/Restrictions fall  -RB     Barriers to Rehab none identified  -RB       Row Name 04/17/25 1407          Living Environment    Current Living Arrangements home  -RB     People in Home spouse;grandchild(fransisca)  -RB       Row Name 04/17/25 1407          Home Main Entrance    Number of Stairs, Main Entrance two  -RB       Row Name 04/17/25 1407          Cognition    Orientation Status (Cognition) oriented x 3  -RB       Row Name 04/17/25 1407          Safety Issues/Impairments Affecting Functional Mobility    Impairments Affecting Function (Mobility) cognition;balance;strength  -RB               User Key  (r) = Recorded By, (t) = Taken  By, (c) = Cosigned By      Initials Name Provider Type    RB Selena Gonsales OT Occupational Therapist                     Mobility/ADL's       Row Name 04/17/25 1409          Bed Mobility    Comment, (Bed Mobility) sitting in her bedside chair  -RB       Row Name 04/17/25 1409          Transfers    Transfers sit-stand transfer;stand-sit transfer  -RB       Row Name 04/17/25 1409          Sit-Stand Transfer    Sit-Stand [place] (Transfers) contact guard  -RB       Row Name 04/17/25 1409          Stand-Sit Transfer    Stand-Sit [place] (Transfers) contact guard  -RB       Row Name 04/17/25 1409          Functional Mobility    Functional Mobility- Ind. Level contact guard assist  -RB       Row Name 04/17/25 1409          Activities of Daily Living    BADL Assessment/Intervention feeding  -RB       Row Name 04/17/25 1409          Self-Feeding Assessment/Training    [place] Level (Feeding) feeding skills;set up  -RB     Position (Feeding) supported sitting  -RB               User Key  (r) = Recorded By, (t) = Taken By, (c) = Cosigned By      Initials Name Provider Type    Selena Thomas OT Occupational Therapist                   Obj/Interventions       Row Name 04/17/25 1410          Sensory Assessment (Somatosensory)    Sensory Assessment (Somatosensory) sensation intact  -RB       Row Name 04/17/25 1410          Vision Assessment/Intervention    Visual Impairment/Limitations WFL  -RB       Row Name 04/17/25 1410          Range of Motion Comprehensive    General Range of Motion no range of motion deficits identified  -RB       Row Name 04/17/25 1410          Strength Comprehensive (MMT)    Comment, General Manual Muscle Testing (MMT) Assessment The pt reports generalized weakness from her prior stroke, no L vs. R focal deficits  -RB       Row Name 04/17/25 1410          Motor Skills    Motor Skills functional endurance  -RB     Functional Endurance The pt engaged in ~8 min of standing  functional endurance with no significant fatigue. slow pacing.  -RB       Row Name 04/17/25 1410          Balance    Comment, Balance CGA for standing balance, no AD. No LOB.  -RB               User Key  (r) = Recorded By, (t) = Taken By, (c) = Cosigned By      Initials Name Provider Type    Selena Thomas, OT Occupational Therapist                   Goals/Plan       Row Name 04/17/25 1412          Bed Mobility Goal 1 (OT)    Activity/Assistive Device (Bed Mobility Goal 1, OT) bed mobility activities, all  -RB     [place] Level/Cues Needed (Bed Mobility Goal 1, OT) standby assist  -RB     Time Frame (Bed Mobility Goal 1, OT) short term goal (STG);2 weeks  -RB     Progress/Outcomes (Bed Mobility Goal 1, OT) new goal  -RB       Row Name 04/17/25 1412          Transfer Goal 1 (OT)    Activity/Assistive Device (Transfer Goal 1, OT) transfers, all  -RB     [place] Level/Cues Needed (Transfer Goal 1, OT) standby assist  -RB     Time Frame (Transfer Goal 1, OT) short term goal (STG);2 weeks  -RB     Progress/Outcome (Transfer Goal 1, OT) new goal  -RB       Row Name 04/17/25 1412          Bathing Goal 1 (OT)    Activity/Device (Bathing Goal 1, OT) bathing skills, all  -RB     [place] Level/Cues Needed (Bathing Goal 1, OT) standby assist  -RB     Time Frame (Bathing Goal 1, OT) short term goal (STG);2 weeks  -RB     Progress/Outcomes (Bathing Goal 1, OT) new goal  -RB       Row Name 04/17/25 1412          Dressing Goal 1 (OT)    Activity/Device (Dressing Goal 1, OT) dressing skills, all  -RB     [place]/Cues Needed (Dressing Goal 1, OT) standby assist  -RB     Time Frame (Dressing Goal 1, OT) short term goal (STG);2 weeks  -RB     Progress/Outcome (Dressing Goal 1, OT) new goal  -RB       Row Name 04/17/25 1412          Toileting Goal 1 (OT)    Activity/Device (Toileting Goal 1, OT) toileting skills, all  -RB     [place] Level/Cues Needed (Toileting Goal 1, OT) standby assist  -RB     Time  Frame (Toileting Goal 1, OT) short term goal (STG);2 weeks  -RB     Progress/Outcome (Toileting Goal 1, OT) new goal  -RB       Row Name 04/17/25 1412          Grooming Goal 1 (OT)    Activity/Device (Grooming Goal 1, OT) grooming skills, all  -RB     [place] (Grooming Goal 1, OT) standby assist  -RB     Time Frame (Grooming Goal 1, OT) short term goal (STG);2 weeks  -RB     Progress/Outcome (Grooming Goal 1, OT) new goal  -RB       Row Name 04/17/25 1412          Therapy Assessment/Plan (OT)    Planned Therapy Interventions (OT) transfer/mobility retraining;strengthening exercise;ROM/therapeutic exercise;activity tolerance training;functional balance retraining;BADL retraining;patient/caregiver education/training;occupation/activity based interventions;neuromuscular control/coordination retraining  -RB               User Key  (r) = Recorded By, (t) = Taken By, (c) = Cosigned By      Initials Name Provider Type    RB Selena Gonsales, OT Occupational Therapist                   Clinical Impression       Row Name 04/17/25 1412          Pain Assessment    Pretreatment Pain Rating 0/10 - no pain  -RB     Posttreatment Pain Rating 0/10 - no pain  -RB       Row Name 04/17/25 1412          Plan of Care Review    Plan of Care Reviewed With patient  -RB     Progress no change  -RB     Outcome Evaluation The pt was admitted to [facility] 2/2 to generalied weakness and difficulty swallowing. She was hospitalized earlier this month for a stroke work up and found to have pontine and right basal ganglia infarcts. Pmhx significant for hypertension, hyperlipidemia, probable obstructive sleep apnea. The pt lives at home with her spouse who reports independence with ADL's and mobility using no AD. The pt reports increased difficulty completing home and work tasks since her stroke due to cognitive and strength deficits. Today, the pt completed self feeding after set-up. She stood with CGA and mobilized x8 minutes with slow pacing. No  LOB. She reports no difficulty with toileting or grooming since her admission - SBA has been provided by family members. She is interested in d/c home with HH with a transition to OP OT/PT/SLP. Info was provided to the patient on the [facility] Neuro therapy clinic.  -RB       Row Name 04/17/25 1412          Therapy Assessment/Plan (OT)    Rehab Potential (OT) good  -RB     Criteria for Skilled Therapeutic Interventions Met (OT) yes;skilled treatment is necessary  -RB     Therapy Frequency (OT) 5 times/wk  -RB       Row Name 04/17/25 1412          Therapy Plan Review/Discharge Plan (OT)    Anticipated Discharge Disposition (OT) home with assist;home with outpatient therapy services  -RB       Row Name 04/17/25 1413 04/17/25 1412       Positioning and Restraints    Pre-Treatment Position in bed  -RB in bed  -RB    Post Treatment Position chair  -RB bed  -RB    In Bed -- notified nsg;supine;call light within reach;encouraged to call for assist;fowlers;legs elevated  -RB    In Chair notified nsg;reclined;sitting;call light within reach;encouraged to call for assist;with family/caregiver  -RB --              User Key  (r) = Recorded By, (t) = Taken By, (c) = Cosigned By      Initials Name Provider Type    RB Selena Gonsales, OT Occupational Therapist                   Outcome Measures       Row Name 04/17/25 1413          How much help from another is currently needed...    Putting on and taking off regular lower body clothing? 3  -RB     Bathing (including washing, rinsing, and drying) 3  -RB     Toileting (which includes using toilet bed pan or urinal) 3  -RB     Putting on and taking off regular upper body clothing 3  -RB     Taking care of personal grooming (such as brushing teeth) 3  -RB     Eating meals 3  -RB     AM-PAC 6 Clicks Score (OT) 18  -RB       Row Name 04/17/25 1413          Modified [place] Scale    Modified David Scale 3 - Moderate disability.  Requiring some help, but able to walk without assistance.  -RB        Row Name 04/17/25 1413          Functional Assessment    Outcome Measure Options AM-PAC 6 Clicks Daily Activity (OT);Modified [place]  -RB               User Key  (r) = Recorded By, (t) = Taken By, (c) = Cosigned By      Initials Name Provider Type    Selena Thomas, RACHAEL Occupational Therapist                      OT Recommendation and Plan  Planned Therapy Interventions (OT): transfer/mobility retraining, strengthening exercise, ROM/therapeutic exercise, activity tolerance training, functional balance retraining, BADL retraining, patient/caregiver education/training, occupation/activity based interventions, neuromuscular control/coordination retraining  Therapy Frequency (OT): 5 times/wk  Plan of Care Review  Plan of Care Reviewed With: patient  Progress: no change  Outcome Evaluation: The pt was admitted to [facility] 2/2 to generalied weakness and difficulty swallowing. She was hospitalized earlier this month for a stroke work up and found to have pontine and right basal ganglia infarcts. Pmhx significant for hypertension, hyperlipidemia, probable obstructive sleep apnea. The pt lives at home with her spouse who reports independence with ADL's and mobility using no AD. The pt reports increased difficulty completing home and work tasks since her stroke due to cognitive and strength deficits. Today, the pt completed self feeding after set-up. She stood with CGA and mobilized x8 minutes with slow pacing. No LOB. She reports no difficulty with toileting or grooming since her admission - SBA has been provided by family members. She is interested in d/c home with HH with a transition to OP OT/PT/SLP. Info was provided to the patient on the [facility] Neuro therapy clinic.     Time Calculation:   Evaluation Complexity (OT)  Review Occupational Profile/Medical/Therapy History Complexity: expanded/moderate complexity  Assessment, Occupational Performance/Identification of Deficit Complexity: 3-5 performance deficits  Clinical  Decision Making Complexity (OT): detailed assessment/moderate complexity  Overall Complexity of Evaluation (OT): moderate complexity     Time Calculation- OT       Row Name 04/17/25 1406             Time Calculation- OT    OT Start Time 1330  -RB      OT Stop Time 1400  -RB      OT Time Calculation (min) 30 min  -RB      Total Timed Code Minutes- OT 23 minute(s)  -RB      OT Received On 04/17/25  -RB      OT - Next Appointment 04/18/25  -RB      OT Goal Re-Cert Due Date 05/01/25  -RB         Timed Charges    70981 - OT Therapeutic Activity Minutes 23  -RB         Untimed Charges    OT Eval/Re-eval Minutes 7  -RB         Total Minutes    Timed Charges Total Minutes 23  -RB      Untimed Charges Total Minutes 7  -RB       Total Minutes 30  -RB                User Key  (r) = Recorded By, (t) = Taken By, (c) = Cosigned By      Initials Name Provider Type    RB Selena Gonsales OT Occupational Therapist                  Therapy Charges for Today       Code Description Service Date Service Provider Modifiers Qty    56522438115 HC OT THERAPEUTIC ACT EA 15 MIN 4/17/2025 Selena Gonsales OT GO 2    41977528689 HC OT EVAL MOD COMPLEXITY 2 4/17/2025 Selena Gonsales OT GO 1                 Selena Gonsales OT  4/17/2025

## 2025-04-17 NOTE — CASE MANAGEMENT/SOCIAL WORK
Discharge Planning Assessment  [facility]     Patient Name: Nery Jarrell  MRN: [id number]  Today's Date: 4/17/2025    Admit Date: 4/16/2025    Plan: Home with spouse and 14 yr old granddtr and current w/ [facility].   Discharge Needs Assessment       Row Name 04/17/25 0903       Living Environment    People in Home spouse;grandchild(fransisca)    Current Living Arrangements home    Potentially Unsafe Housing Conditions none    In the past 12 months has the electric, gas, oil, or water company threatened to shut off services in your home? No    Primary Care Provided by self    Provides Primary Care For no one    Family Caregiver if Needed spouse    Quality of Family Relationships helpful;supportive    Able to Return to Prior Arrangements yes       Resource/Environmental Concerns    Resource/Environmental Concerns none    Transportation Concerns none       Transportation Needs    In the past 12 months, has lack of transportation kept you from medical appointments or from getting medications? no    In the past 12 months, has lack of transportation kept you from meetings, work, or from getting things needed for daily living? No       Food Insecurity    Within the past 12 months, you worried that your food would run out before you got the money to buy more. Never true    Within the past 12 months, the food you bought just didn't last and you didn't have money to get more. Never true       Transition Planning    Patient/Family Anticipates Transition to home;home with help/services    Patient/Family Anticipated Services at Transition home health care    Transportation Anticipated family or friend will provide       Discharge Needs Assessment    Current Outpatient/Agency/Support Group homecare agency    Equipment Currently Used at Home walker, rolling    Concerns to be Addressed discharge planning    Do you want help finding or keeping work or a job? I do not need or want help    Do you want help with school or training?  For example, starting or completing job training or getting a high school diploma, GED or equivalent No    Anticipated Changes Related to Illness none    Equipment Needed After Discharge none    Discharge Facility/Level of Care Needs home with home health    Provided Post Acute Provider List? N/A    Provided Post Acute Provider Quality & Resource List? N/A                   Discharge Plan       Row Name 04/17/25 0906       Plan    Plan Home with spouse and 14 yr old granddtr and current w/ [facility].    Plan Comments S/w pt at bedside.  Facesheet info confirmed. Pt lives in a 2 story house w/ 2 [address] w/ her spouse Miguel and and 14 yr old granddtr who assist pt at home as needed. Pt's dtr Ksenia lives in [place] and checks on them often. Home DME includes a rolling walker and built-in shower bench.  Pt states she has not been driving recently - Miguel provides transport.  Pt states she is current w/ [facility] - [facility]/ [facility] notified and will follow.  No hx of SNF.  Pt denies difficulty affording meds or basic needs.  PT eval is pending at this time.  Pt states plan is to return home upon DC and continue with [facility] .  CCP will follow and assist as needed. ...........Millie KUMAR/ CCP                  Continued Care and Services - Admitted Since 4/16/2025       Home Medical Care       Service Provider Request Status Services Address Phone Fax Patient Preferred    [facility]  Selected Home Health Services [address], [address], [place] [id number] [phone] --                  Selected Continued Care - Prior Encounters Includes continued care and service providers with selected services from prior encounters from 1/16/2025 to 4/17/2025      Discharged on 4/9/2025 Admission date: 4/8/2025 - Discharge disposition: Home-Health Care Svc      Home Medical Care       Service Provider Services Address Phone Fax Patient Preferred    [facility]  [facility] [address] [place] [facility], [address], [place] [address] [phone] --                             Demographic Summary       Row Name 04/17/25 0902       General Information    Admission Type observation    Arrived From home    Required Notices Provided Observation Status Notice    Referral Source admission list    Reason for Consult discharge planning    Preferred Language English                   Functional Status       Row Name 04/17/25 0903       Functional Status    Usual Activity Tolerance moderate       Functional Status, IADL    Medications independent    Meal Preparation independent;assistive person    Housekeeping independent;assistive person    Laundry independent;assistive person    Shopping independent;assistive person    If for any reason you need help with day-to-day activities such as bathing, preparing meals, shopping, managing finances, etc., do you get the help you need? I get all the help I need       Mental Status    General Appearance WDL WDL       Mental Status Summary    Recent Changes in Mental Status/Cognitive Functioning no changes                          Millie Cueva, RN

## 2025-04-21 DIAGNOSIS — F33.0 MILD EPISODE OF RECURRENT MAJOR DEPRESSIVE DISORDER: Primary | ICD-10-CM

## 2025-05-06 LAB
CV ZIO BASELINE AVG BPM: 62 BPM
CV ZIO BASELINE BPM HIGH: 140 BPM
CV ZIO BASELINE BPM LOW: 34 BPM
CV ZIO DEVICE ANALYSIS TIME: NORMAL
CV ZIO ECT SVE COUNT: 637 EPISODES
CV ZIO ECT SVE CPLT COUNT: 24 EPISODES
CV ZIO ECT SVE CPLT FREQ: NORMAL
CV ZIO ECT SVE FREQ: NORMAL
CV ZIO ECT SVE TPLT COUNT: 3 EPISODES
CV ZIO ECT SVE TPLT FREQ: NORMAL
CV ZIO ECT VE COUNT: 7658 EPISODES
CV ZIO ECT VE CPLT COUNT: 45 EPISODES
CV ZIO ECT VE CPLT FREQ: NORMAL
CV ZIO ECT VE FREQ: NORMAL
CV ZIO ECT VE TPLT COUNT: 4 EPISODES
CV ZIO ECT VE TPLT FREQ: NORMAL
CV ZIO ECTOPIC SVE COUPLET RAW PERCENT: 0 %
CV ZIO ECTOPIC SVE ISOLATED PERCENT: 0.06 %
CV ZIO ECTOPIC SVE TRIPLET RAW PERCENT: 0 %
CV ZIO ECTOPIC VE COUPLET RAW PERCENT: 0.01 %
CV ZIO ECTOPIC VE ISOLATED PERCENT: 0.67 %
CV ZIO ECTOPIC VE TRIPLET RAW PERCENT: 0 %
CV ZIO ENROLLMENT END: NORMAL
CV ZIO ENROLLMENT START: NORMAL
CV ZIO L BIGEMINY DUR: 5.9 SEC
CV ZIO L BIGEMINY END: NORMAL
CV ZIO L BIGEMINY START: NORMAL
CV ZIO L TRIGEMINY DUR: 11.9 SEC
CV ZIO L TRIGEMINY END: NORMAL
CV ZIO L TRIGEMINY START: NORMAL
CV ZIO PATIENT EVENTS DIARIES: 0
CV ZIO PATIENT EVENTS TRIGGERS: 0
CV ZIO PAUSE COUNT: 0
CV ZIO PRESCRIPTION STATUS: NORMAL
CV ZIO SVT AVG BPM: 118 BPM
CV ZIO SVT BPM HIGH: 140 BPM
CV ZIO SVT BPM LOW: 86 BPM
CV ZIO SVT COUNT: 2
CV ZIO SVT F EPI AVG BPM: 120 BPM
CV ZIO SVT F EPI BEATS: 11 BEATS
CV ZIO SVT F EPI BPM HIGH: 140 BPM
CV ZIO SVT F EPI BPM LOW: 102 BPM
CV ZIO SVT F EPI DUR: 6.4 SEC
CV ZIO SVT F EPI END: NORMAL
CV ZIO SVT F EPI START: NORMAL
CV ZIO SVT L EPI AVG BPM: 117 BPM
CV ZIO SVT L EPI BEATS: 44 BEATS
CV ZIO SVT L EPI BPM HIGH: 130 BPM
CV ZIO SVT L EPI BPM LOW: 86 BPM
CV ZIO SVT L EPI DUR: 23 SEC
CV ZIO SVT L EPI END: NORMAL
CV ZIO SVT L EPI START: NORMAL
CV ZIO TOTAL  ENROLLMENT PERIOD: NORMAL
CV ZIO VT COUNT: 0

## 2025-05-19 ENCOUNTER — OFFICE VISIT (OUTPATIENT)
Dept: SLEEP MEDICINE | Facility: HOSPITAL | Age: 70
End: 2025-05-19
Payer: MEDICARE

## 2025-05-19 VITALS
WEIGHT: 188 LBS | HEIGHT: 62 IN | BODY MASS INDEX: 34.6 KG/M2 | HEART RATE: 78 BPM | SYSTOLIC BLOOD PRESSURE: 121 MMHG | OXYGEN SATURATION: 95 % | DIASTOLIC BLOOD PRESSURE: 76 MMHG

## 2025-05-19 DIAGNOSIS — G47.33 OBSTRUCTIVE SLEEP APNEA, ADULT: ICD-10-CM

## 2025-05-19 DIAGNOSIS — G47.30 OBSERVED SLEEP APNEA: ICD-10-CM

## 2025-05-19 DIAGNOSIS — R06.83 LOUD SNORING: ICD-10-CM

## 2025-05-19 DIAGNOSIS — Z91.89 AT RISK FOR OBSTRUCTIVE SLEEP APNEA: Primary | ICD-10-CM

## 2025-05-19 DIAGNOSIS — E66.9 OBESITY (BMI 30-39.9): ICD-10-CM

## 2025-05-19 DIAGNOSIS — G47.10 HYPERSOMNOLENCE: ICD-10-CM

## 2025-05-19 PROCEDURE — G0463 HOSPITAL OUTPT CLINIC VISIT: HCPCS

## 2025-05-19 NOTE — PROGRESS NOTES
[facility]  [address]  [place] [address]  [phone]    Referring Physician: Sona Malcolm.  PCP: Cornelio Rosa APRN    Reason for consult:  Sleep disorders of hx stroke    Nery Jarrell is a 70 y.o.female was seen in the [facility] today. She sleeps from 12mn to 8am. Has trouble going to sleep. Wakes up tired. Has loud snoring and apneas at night. Has some myoclonic jerking of right leg - hx stroke. On diazepam and helps.  [place] Sleepiness Score: 12. Caffeine intake 1 per day. Alcohol intake 0 per week.    Nery Jarrell  has a past medical history of Abdominal wall mass (03/2014), Abnormal mammogram (09/04/2018), Abnormal mammogram (06/2015), Anxiety, Colon polyps, Colon stricture (07/17/2019), Depression, Foot deformity, GERD (gastroesophageal reflux disease), Hyperlipidemia, Hypertension, Left breast mass (06/2015), OA (osteoarthritis), Obesity, Post-menopausal, and Vitamin B 12 deficiency.     Current Medications:    Current Outpatient Medications:     amLODIPine (NORVASC) 5 MG tablet, Take 1 tablet by mouth Daily for 60 days., Disp: 30 tablet, Rfl: 1    aspirin 81 MG EC tablet, Take 1 tablet by mouth Daily., Disp: 30 tablet, Rfl: 0    diazePAM (VALIUM) 5 MG tablet, Take 1 tablet by mouth At Night As Needed for Anxiety or Sleep. (Patient not taking: Reported on 4/16/2025), Disp: 20 tablet, Rfl: 0    diphenhydrAMINE-acetaminophen (TYLENOL PM)  MG tablet per tablet, Take 1 tablet by mouth At Night As Needed for Sleep., Disp: , Rfl:     escitalopram (LEXAPRO) 10 MG tablet, Take 1.5 tablets by mouth Every Night., Disp: , Rfl:     hydroCHLOROthiazide 25 MG tablet, Take 1 tablet by mouth Daily., Disp: , Rfl:     magnesium gluconate (MAGONATE) 500 MG tablet, Take 27 mg by mouth every night at bedtime., Disp: , Rfl:     melatonin 5 MG tablet tablet, Take 2 tablets by mouth At Night As Needed., Disp: , Rfl:     Omega 3 1000 MG capsule, Take 1,000 mg by mouth  "Daily., Disp: , Rfl:     rosuvastatin (CRESTOR) 40 MG tablet, Take 1 tablet by mouth Daily., Disp: 90 tablet, Rfl: 0    Turmeric (QC Tumeric Complex) 500 MG capsule, Take 2 capsules by mouth Daily With Breakfast. Indications: Inflammation, Disp: , Rfl:     valsartan (DIOVAN) 320 MG tablet, Take 1 tablet by mouth Daily. Indications: High Blood Pressure Disorder, Disp: 90 tablet, Rfl: 1    Vitamin D, Cholecalciferol, 1000 units capsule, Take 5 capsules by mouth Daily., Disp: , Rfl:    also listed in Sleep Questionnaire.    FH: family history includes Breast cancer in her maternal aunt; COPD in her mother; Cancer in her father; Coronary artery disease in her brother; Diabetes in her brother and father; Drug abuse in her son; Emphysema in her mother; Heart disease in her father and mother; Hypertension in her brother; Kidney disease in her father; Lymphoma in her father; No Known Problems in her daughter; Peripheral vascular disease in her brother; Stroke in her brother; Vision loss in her father.  SH:  reports that she quit smoking about 21 years ago. Her smoking use included cigarettes. She started smoking about 32 years ago. She has a 11 pack-year smoking history. She has never used smokeless tobacco. She reports current alcohol use. She reports that she does not use drugs.    Pertinent Positive Review of Systems of neck pain, anxiety, depression, fatigue, problem swallowing and heartburn  Rest of Review of Systems was negative as recorded in Sleep Questionnaire.        Vital Signs: /76   Pulse 78   Ht 157.5 cm (62\")   Wt 85.3 kg (188 lb)   SpO2 95%   BMI 34.39 kg/m²     Body mass index is 34.39 kg/m².       Tongue: Large       Dentition: good       Pharynx: Posterior pharyngeal pillars are wide   Mallampatti: III (soft and hard palate and base of uvula visible)        General: Alert. Cooperative. Well developed. No acute distress.             Head:  Normocephalic. Symmetrical. Atraumatic.              " Eyes: Sclera clear. No icterus. PERRLA. Normal EOM.            Nose: No septal deviation. No drainage.          Throat: No oral lesions. No thrush. Moist mucous membranes.    Chest Wall:  Normal shape. Symmetric expansion with respiration. No tenderness.             Neck:  Trachea midline.           Lungs:  Clear to auscultation bilaterally.            Heart:  Regular rhythm and normal rate. Normal S1 and S2. No murmur.     Abdomen:  Soft, non-tender and non-distended. Normal bowel sounds. No masses.  Extremities:  Moves all extremities well. No edema.           Pulses: Pulses palpable and equal bilaterally.               Skin: Dry. Intact. No bleeding. No rash.           Neuro: Moves all 4 extremities and cranial nerves grossly intact.  Psychiatric: Normal mood and affect.      Report:  No scans are attached to the encounter or orders placed in the encounter.      Impression:  1. At risk for obstructive sleep apnea    2. Obstructive sleep apnea, adult    3. Hypersomnolence    4. Loud snoring    5. Observed sleep apnea    6. Obesity (BMI 30-39.9)          Orders Placed This Encounter   Procedures    Home Sleep Study     Standing Status:   Future     Expected Date:   5/24/2025     Expiration Date:   5/19/2026     Scheduling Instructions:      Ask patient to sleep on back as much as possible on night of study     May take own meds:   Yes     Details:   O2 Implementation per Protocol     Release to patient:   Routine Release [[id number]]            Plan:  Nery requires further testing to rule out sleep disordered breathing.  I recommended in-lab polysomnogram study which may also help us determine cause for her myoclonic jerking.  However she prefers and wants HST.  States that she sleeps with her cats at night.  I remain concerned that this might affect study results or disrupt her home sleep test.    This patient has suspected obstructive sleep apnea and testing has been ordered. Possible diagnosis and  pathophysiology of obstructive sleep apnea was discussed with the patient.  Health risks of untreated obstructive sleep apnea include cardiovascular and cerebrovascular health risks.  Patient will need to be cautious with activities requiring full concentration especially driving at night or for longer distances, and until hypersomnolence is corrected. Treatment options for sleep apnea include positional therapy, oral mandibular advancing device, positive pressure device or hypoglossal nerve stimulator. Positive pressure device is often the most effective treatment to completely correct sleep apnea. In patients with elevated BMI, weight loss can help in reducing sleep apnea severity.    Patient will follow up in this clinic after testing    Thank you for allowing me to participate in your patient's care.    Electronically signed by Liu Berg MD, 05/19/25, 1:11 PM EDT.    Part of this note may be an electronic transcription/translation of spoken language to printed text using the Dragon Dictation System.

## 2025-05-27 ENCOUNTER — HOSPITAL ENCOUNTER (OUTPATIENT)
Dept: SLEEP MEDICINE | Facility: HOSPITAL | Age: 70
Discharge: HOME | End: 2025-05-27
Admitting: INTERNAL MEDICINE
Payer: MEDICARE

## 2025-05-27 DIAGNOSIS — G47.33 OBSTRUCTIVE SLEEP APNEA, ADULT: ICD-10-CM

## 2025-05-27 PROCEDURE — G0399 HOME SLEEP TEST/TYPE 3 PORTA: HCPCS

## 2025-05-30 DIAGNOSIS — G47.33 OBSTRUCTIVE SLEEP APNEA, ADULT: Primary | ICD-10-CM

## 2025-06-03 ENCOUNTER — DOCUMENTATION (OUTPATIENT)
Dept: SLEEP MEDICINE | Facility: HOSPITAL | Age: 70
End: 2025-06-03
Payer: MEDICARE

## 2025-06-03 ENCOUNTER — APPOINTMENT (OUTPATIENT)
Dept: CT IMAGING | Facility: HOSPITAL | Age: 70
End: 2025-06-03
Payer: MEDICARE

## 2025-06-03 ENCOUNTER — APPOINTMENT (OUTPATIENT)
Dept: GENERAL RADIOLOGY | Facility: HOSPITAL | Age: 70
End: 2025-06-03
Payer: MEDICARE

## 2025-06-03 ENCOUNTER — HOSPITAL ENCOUNTER (EMERGENCY)
Facility: HOSPITAL | Age: 70
Discharge: HOME | End: 2025-06-03
Attending: EMERGENCY MEDICINE | Admitting: EMERGENCY MEDICINE
Payer: MEDICARE

## 2025-06-03 VITALS
DIASTOLIC BLOOD PRESSURE: 72 MMHG | OXYGEN SATURATION: 96 % | BODY MASS INDEX: 34.04 KG/M2 | RESPIRATION RATE: 17 BRPM | SYSTOLIC BLOOD PRESSURE: 148 MMHG | HEIGHT: 62 IN | TEMPERATURE: 97.8 F | HEART RATE: 71 BPM | WEIGHT: 185 LBS

## 2025-06-03 DIAGNOSIS — S09.90XA INJURY OF HEAD, INITIAL ENCOUNTER: Primary | ICD-10-CM

## 2025-06-03 DIAGNOSIS — S63.501A SPRAIN OF RIGHT WRIST, INITIAL ENCOUNTER: ICD-10-CM

## 2025-06-03 PROCEDURE — 99284 EMERGENCY DEPT VISIT MOD MDM: CPT | Performed by: EMERGENCY MEDICINE

## 2025-06-03 PROCEDURE — 70450 CT HEAD/BRAIN W/O DYE: CPT

## 2025-06-03 PROCEDURE — 73110 X-RAY EXAM OF WRIST: CPT

## 2025-06-03 NOTE — PROGRESS NOTES
Patient was called and left a detailed VM per Vrbl agreement in Epic: Sleep study results.  New setup therapy device and supply order along with a NIDHI has been sent over to [email or web address]

## 2025-06-03 NOTE — PROGRESS NOTES
Pt called and was able to give her sleep study results over the phone.  Pt verbalized that she understood.  Pt will also have a NIDHI once she picks up her device.  New therapy device and supply order has been sent over to [facility]

## 2025-06-03 NOTE — ED TRIAGE NOTES
Lost balance on Sunday bending over hit head on metal safe no LOC  Had stroke 4 months ago has occupational therapy that came out today and advised her to be seen because of her fall- is on plavix

## 2025-06-04 NOTE — ED PROVIDER NOTES
Subjective   History of Present Illness  70 yr old female presents stating she fell 3 days ago. She lost her balance and fell hitting her head and right wrist.  No LOC.  No blurred vision. No dizziness, No N/V.  One of the home health workers came to her house today and she told them about the fall and they told her she needed to come to the ER and get her head scanned because she is on blood thinners.  Pt says she feels fine, but came in as instructed.  Wrist is a little sore.  Pain is 1/10.        Review of Systems   Constitutional: Negative.    HENT: Negative.     Eyes: Negative.    Respiratory: Negative.     Cardiovascular: Negative.    Gastrointestinal: Negative.    Genitourinary: Negative.    Musculoskeletal:  Positive for arthralgias.   Skin: Negative.    Neurological: Negative.        Past Medical History:   Diagnosis Date    Abdominal wall mass 2014    Abnormal mammogram 2018    Abnormal mammogram 2015    Anxiety     Colon polyps     Colon stricture 2019    Depression     Foot deformity     GERD (gastroesophageal reflux disease)     Hyperlipidemia     Hypertension     Left breast mass 2015    OA (osteoarthritis)     Obesity     Post-menopausal     Vitamin B 12 deficiency        Allergies   Allergen Reactions    Dextromethorphan Unknown - Low Severity    Elavil [Amitriptyline Hcl] Dizziness    Norvasc [Amlodipine Besylate] Swelling     Ankles swelling       Past Surgical History:   Procedure Laterality Date    BILATERAL BREAST REDUCTION Bilateral     BREAST BIOPSY Left     CARDIAC CATHETERIZATION Right 2014    MILD NONOBSTRUCTIVE CAD, NORMAL LEFT VENTRICULAR SYSTOLIC FUNCTION, LEFT VENTRICULAR HYPERTROPHY, DR. LESTER HECK AT [facility]     SECTION N/A 1976     SECTION N/A 1979    CHOLECYSTECTOMY N/A 2014    LAPAROSCOPIC WITH CHOLANGIOGRAM, DR. TRAY NARVAEZ AT [facility]    COLONOSCOPY N/A 2003    GOOD PREP, NONSPECIFIC ILEITIS, SIGMOID  "DIVERTICULOSIS, DR. AURY SOTELO AT [facility]    COLONOSCOPY N/A 2019    SIGMOID STRICTURE, DIVERTICULOSIS, PELVIC ADHESIONS, DR. AURY SOTELO AT  [facility]    COLONOSCOPY W/ POLYPECTOMY N/A 2003    LARGE INTERNAL HEMORRHOIDS, 3 MM HYPERPLASTIC POLYP IN RECTUM, SCATTERED DIVERTICULOSIS, DR. ADOLFO ISLAS AT [facility]     ERCP N/A 01/10/2014    CHOLEDOCHOLITHIASIS WAS FOUND, COMPLETE REMOVAL BY BILIARY SPHINCTEROTOMY AND BALLOON EXTRACTION, DR. MARGA MARAVILLA AT [facility]    ERCP N/A 2014    PRIOR BILIARY SPHINCTEROTOMY APPEARED STENOSED, DR. MARGA MARAVILLA AT [facility]    ERCP WITH FOREIGN BODY REMOVAL N/A 2014    PRIOR BILIARY SPHINCTEROTOMY APPEARD OPEN, 1 VISIBLY OCCLUDED STENT FROM THE BILIARY TREE WAS IN THE MAJOR PAPILLA, STENT REMOVED FROM BILIARY TREE, DR. MARGA MARAVILLA AT [facility]    MOUTH SURGERY N/A 2018    REDUCTION MAMMAPLASTY         Family History   Problem Relation Age of Onset    COPD Mother     Emphysema Mother     Heart disease Mother     Heart disease Father     Kidney disease Father     Vision loss Father     Cancer Father     Diabetes Father     Lymphoma Father     Stroke Brother     Hypertension Brother     Coronary artery disease Brother         stent    Peripheral vascular disease Brother         stent in the carotid    Diabetes Brother     No Known Problems Daughter     Drug abuse Son     Breast cancer Maternal Aunt        Social History     Socioeconomic History    Marital status:      Spouse name: Venu\"Miguel\"    Number of children: 2    Years of education: College degree   Tobacco Use    Smoking status: Former     Current packs/day: 0.00     Average packs/day: 1 pack/day for 11.0 years (11.0 ttl pk-yrs)     Types: Cigarettes     Start date:      Quit date: 2004     Years since quittin.4    Smokeless tobacco: Never   Vaping Use    Vaping status: Never Used   Substance and Sexual Activity    Alcohol use: Yes     Comment: 0-1 per month    Drug use: No     " Comment: quit marijuana 19yrs ago    Sexual activity: Yes     Partners: Male     Birth control/protection: Post-menopausal           Objective   Physical Exam  Vitals and nursing note reviewed.   Constitutional:       Appearance: Normal appearance.   HENT:      Right Ear: Hearing, tympanic membrane, ear canal and external ear normal.      Left Ear: Hearing, tympanic membrane, ear canal and external ear normal.      Nose: Nose normal.      Mouth/Throat:      Lips: Pink.      Mouth: Mucous membranes are moist.      Pharynx: Oropharynx is clear. Uvula midline.   Eyes:      General: Lids are normal. Vision grossly intact. Gaze aligned appropriately.      Extraocular Movements: Extraocular movements intact.      Conjunctiva/sclera: Conjunctivae normal.      Pupils: Pupils are equal, round, and reactive to light.   Pulmonary:      Effort: Pulmonary effort is normal.      Breath sounds: Normal breath sounds and air entry.   Skin:     General: Skin is warm and dry.      Capillary Refill: Capillary refill takes less than 2 seconds.   Neurological:      Mental Status: She is alert.         Procedures           ED Course  ED Course as of 06/03/25 2027 Tue Jun 03, 2025 2014 CT Head Without Contrast  Reviewed CT results with patient [JJ]   2026 Ace wrap to right wrist.  If no improvement F/U with orthopedics. [JJ]      ED Course User Index  [JJ] Ashley العراقي, FREEDOM                                                       Medical Decision Making  Amount and/or Complexity of Data Reviewed  Radiology: ordered.        Final diagnoses:   Injury of head, initial encounter   Sprain of right wrist, initial encounter       ED Disposition  ED Disposition       ED Disposition   Discharge    Condition   Stable    Comment   --               Cornelio Rosa, FREEDOM  [address] [id number] [phone]    Call   As needed, If symptoms worsen         Medication List      No changes were made to your  prescriptions during this visit.            Ashley العراقي, APRN  06/03/25 2027

## 2025-07-08 ENCOUNTER — OFFICE VISIT (OUTPATIENT)
Dept: NEUROLOGY | Facility: CLINIC | Age: 70
End: 2025-07-08
Payer: MEDICARE

## 2025-07-08 ENCOUNTER — LAB (OUTPATIENT)
Dept: LAB | Facility: HOSPITAL | Age: 70
End: 2025-07-08
Payer: MEDICARE

## 2025-07-08 VITALS
BODY MASS INDEX: 33.86 KG/M2 | HEART RATE: 76 BPM | WEIGHT: 184 LBS | SYSTOLIC BLOOD PRESSURE: 110 MMHG | DIASTOLIC BLOOD PRESSURE: 78 MMHG | OXYGEN SATURATION: 98 % | HEIGHT: 62 IN

## 2025-07-08 DIAGNOSIS — Z09 HOSPITAL DISCHARGE FOLLOW-UP: Primary | ICD-10-CM

## 2025-07-08 DIAGNOSIS — M12.9 ARTHROPATHY, UNSPECIFIED: ICD-10-CM

## 2025-07-08 DIAGNOSIS — G89.29 OTHER CHRONIC PAIN: ICD-10-CM

## 2025-07-08 DIAGNOSIS — G25.81 RESTLESS LEGS SYNDROME (RLS): ICD-10-CM

## 2025-07-08 DIAGNOSIS — Z86.73 HISTORY OF STROKE: ICD-10-CM

## 2025-07-08 DIAGNOSIS — G47.30 SLEEP APNEA, UNSPECIFIED TYPE: ICD-10-CM

## 2025-07-08 DIAGNOSIS — E78.5 HYPERLIPIDEMIA, UNSPECIFIED HYPERLIPIDEMIA TYPE: ICD-10-CM

## 2025-07-08 LAB
FERRITIN SERPL-MCNC: 92.9 NG/ML (ref 13–150)
IRON 24H UR-MRATE: 72 MCG/DL (ref 37–145)
IRON SATN MFR SERPL: 15 % (ref 20–50)
TIBC SERPL-MCNC: 466 MCG/DL (ref 298–536)
TRANSFERRIN SERPL-MCNC: 313 MG/DL (ref 200–360)

## 2025-07-08 PROCEDURE — 82728 ASSAY OF FERRITIN: CPT

## 2025-07-08 PROCEDURE — 83540 ASSAY OF IRON: CPT

## 2025-07-08 PROCEDURE — 84466 ASSAY OF TRANSFERRIN: CPT

## 2025-07-08 PROCEDURE — 36415 COLL VENOUS BLD VENIPUNCTURE: CPT

## 2025-07-08 RX ORDER — AMLODIPINE BESYLATE 5 MG/1
5 TABLET ORAL DAILY
COMMUNITY
Start: 2025-05-16

## 2025-07-08 RX ORDER — CLOPIDOGREL BISULFATE 75 MG/1
75 TABLET ORAL DAILY
COMMUNITY
Start: 2025-05-16

## 2025-07-08 RX ORDER — ALIROCUMAB 75 MG/ML
75 INJECTION, SOLUTION SUBCUTANEOUS
Qty: 2.24 ML | Refills: 3 | Status: SHIPPED | OUTPATIENT
Start: 2025-07-08

## 2025-07-08 RX ORDER — TRAZODONE HYDROCHLORIDE 50 MG/1
TABLET ORAL
Qty: 30 TABLET | Refills: 0 | Status: SHIPPED | OUTPATIENT
Start: 2025-07-08

## 2025-07-08 RX ORDER — EZETIMIBE 10 MG/1
10 TABLET ORAL DAILY
COMMUNITY
Start: 2025-04-18 | End: 2026-04-18

## 2025-07-08 NOTE — PROGRESS NOTES
Patient or patient representative verbalized consent for the use of [employer]ient [employer] during the visit with  FREEDOM Fried for chart documentation. 7/8/2025  15:32 EDT    Notes by LPN:  Patient presents for hospital follow up for stroke. Patient reports she is doing well.     History of Present Illness  The patient is a 70-year-old  female with a known past medical history of osteoarthritis, hyperlipidemia, hypertension, B12 deficiency, GERD, former tobacco use disorder, and depression/anxiety. She is here today for a follow-up visit after her hospitalization in 04/2025.    She was hospitalized in 04/2025 due to impaired use of her hands, which prevented her from typing. This issue began on a Sunday evening and persisted until her hospital admission. Upon arrival, she was hypertensive with a blood pressure of 214/79 and had a normal temperature of 98. During her hospital stay, she was prescribed dual oral antiplatelets for 21 days and a high-dose statin, with a recommendation to add Repatha in the outpatient setting. She also underwent an echocardiogram, which showed an ejection fraction of 58.1%, normal left ventricular size, mildly increased left atrial size, and a negative saline test. There was no mention of a patent foramen ovale. A long-term Holter monitor was ordered and read as abnormal due to ectopy, including supraventricular tachycardia, ventricular bigeminy, and trigeminy. She was readmitted to the hospital on 04/16/2025. A CT scan of the head showed stability with no evidence of acute infarct or hemorrhage. She experienced hypertensive urgency upon arrival and was noted to have probable obstructive sleep apnea. She also had bradycardia with a heart rate as low as 59 during this admission. It appears that she was not seen by cardiology during this admission and is not currently established with cardiology. Cardiology follow-up is recommended today. Her current medications include Crestor  40 mg daily, Plavix 75 mg daily, amlodipine 5 mg, Lexapro 10 mg, Zetia 10 mg, magnesium gluconate 500 mg, omega-3 1000 mg, turmeric, valsartan 320 mg, vitamin D replacement, aspirin 81 mg, Valium, Tylenol PM, and intermittent use of melatonin.    She reports experiencing bradycardia and elevated blood pressure during her hospital stay. She recalls her heart rate dropping to 36 at home, prompting her nurse to call an ambulance, but she declined hospital admission. She does not remember any slurred speech. She is currently on Plavix but not aspirin. She was previously on extra strength Excedrin but has discontinued it.    She has confirmed sleep apnea and uses a machine regularly. She has been taking diazepam for sleep as needed but has not had it refilled due to concerns about memory interaction. She also takes ZzzQuil for sleep at night. She experiences anxiety when lying in bed with the mask on.    She has restless legs and has purchased a product from [employer] that contains magnesium. She is interested in medication for both sleep and restless legs.    She is prediabetic with an A1c of 6.5.    She has urinary incontinence and uses Pampers. She has an appointment with urology on 08/22/2025.    She has osteoarthritis and receives injections in her knees. She wears a back brace due to pain and finds that Tylenol does not provide relief. She has tried Tylenol Arthritis without success. She experiences pain in all her joints and has difficulty getting out of bed in the morning due to severe pain. She has considered using a TENS unit for her back.    She is currently on Crestor for cholesterol management. She was informed that her insurance would not cover Repatha. Her primary care physician suggested trying Zetia and rosuvastatin. She has an appointment on 08/05/2025 to have her blood checked again.    She continues to have some word-finding difficulty and is concerned about potential Alzheimer's disease. She reports no  seizure-like activity, tongue biting, getting lost while driving, or odd behaviors.    INTERVAL: Since last visit, she was readmitted to the hospital on 2025 due to hypertensive urgency and probable obstructive sleep apnea. She experienced bradycardia with a heart rate as low as 59 during this admission.    SOCIAL HISTORY:    Occupations:     Tobacco: Former tobacco use disorder    Sleep: Uses a machine for sleep apnea regularly, takes ZzzQuil for sleep at night, experiences anxiety when lying in bed with the mask on    MEDICATIONS  CURRENT MEDS:  Crestor 40 mg Oral Daily  Plavix 75 mg Oral Daily  Amlodipine 5 mg Oral  Lexapro 10 mg Oral  Zetia 10 mg Oral  Magnesium gluconate 500 mg Oral  Omega-3 1000 mg Oral  Turmeric Oral  Valsartan 320 mg Oral  Vitamin D replacement Oral  Aspirin 81 mg Oral  Valium Oral  Tylenol PM Oral  Melatonin Oral Intermittent  PREVIOUS MEDS:  Aspirin Oral  Extra Strength Excedrin Oral  Diazepam 5 mg Oral As needed  ZzzQuil Oral    Past Medical History:   Diagnosis Date    Abdominal wall mass 2014    Abnormal mammogram 2018    Abnormal mammogram 2015    Anxiety 2018    Cholelithiasis 2014    removed    Colon polyps     Colon stricture 2019    Depression     Diverticulosis 2002    Foot deformity     GERD (gastroesophageal reflux disease)     Hyperlipidemia     Hypertension     Left breast mass 2015    OA (osteoarthritis)     Obesity     Post-menopausal     Stroke     Urinary tract infection recurring    Vitamin B 12 deficiency          Past Surgical History:   Procedure Laterality Date    BILATERAL BREAST REDUCTION Bilateral     BREAST BIOPSY Left     CARDIAC CATHETERIZATION Right 2014    MILD NONOBSTRUCTIVE CAD, NORMAL LEFT VENTRICULAR SYSTOLIC FUNCTION, LEFT VENTRICULAR HYPERTROPHY, DR. LESTER HECK AT [facility]     SECTION N/A 1976     SECTION N/A     CHOLECYSTECTOMY N/A 2014    LAPAROSCOPIC WITH CHOLANGIOGRAM,   TRAY NARVAEZ AT [facility]    COLONOSCOPY N/A 06/04/2003    GOOD PREP, NONSPECIFIC ILEITIS, SIGMOID DIVERTICULOSIS, DR. AURY SOTELO AT [facility]    COLONOSCOPY N/A 07/17/2019    SIGMOID STRICTURE, DIVERTICULOSIS, PELVIC ADHESIONS, DR. AURY SOTELO AT  [facility]    COLONOSCOPY W/ POLYPECTOMY N/A 01/30/2003    LARGE INTERNAL HEMORRHOIDS, 3 MM HYPERPLASTIC POLYP IN RECTUM, SCATTERED DIVERTICULOSIS, DR. ADOLFO ISLAS AT [facility]     ERCP N/A 01/10/2014    CHOLEDOCHOLITHIASIS WAS FOUND, COMPLETE REMOVAL BY BILIARY SPHINCTEROTOMY AND BALLOON EXTRACTION, DR. MARGA MARAVILLA AT [facility]    ERCP N/A 01/12/2014    PRIOR BILIARY SPHINCTEROTOMY APPEARED STENOSED, DR. MARGA MARAVILLA AT [facility]    ERCP WITH FOREIGN BODY REMOVAL N/A 04/09/2014    PRIOR BILIARY SPHINCTEROTOMY APPEARD OPEN, 1 VISIBLY OCCLUDED STENT FROM THE BILIARY TREE WAS IN THE MAJOR PAPILLA, STENT REMOVED FROM BILIARY TREE, DR. MARGA MARAVILLA AT [facility]    MOUTH SURGERY N/A 08/31/2018    REDUCTION MAMMAPLASTY             Current Outpatient Medications:     amLODIPine (NORVASC) 5 MG tablet, Take 1 tablet by mouth Daily., Disp: , Rfl:     clopidogrel (PLAVIX) 75 MG tablet, Take 1 tablet by mouth Daily., Disp: , Rfl:     escitalopram (LEXAPRO) 10 MG tablet, Take 1.5 tablets by mouth Every Night., Disp: , Rfl:     ezetimibe (ZETIA) 10 MG tablet, Take 1 tablet by mouth Daily., Disp: , Rfl:     hydroCHLOROthiazide 25 MG tablet, Take 1 tablet by mouth Daily., Disp: , Rfl:     magnesium gluconate (MAGONATE) 500 MG tablet, Take 27 mg by mouth every night at bedtime., Disp: , Rfl:     Omega 3 1000 MG capsule, Take 1,000 mg by mouth Daily., Disp: , Rfl:     rosuvastatin (CRESTOR) 40 MG tablet, Take 1 tablet by mouth Daily., Disp: 90 tablet, Rfl: 0    Turmeric (QC Tumeric Complex) 500 MG capsule, Take 2 capsules by mouth Daily With Breakfast. Indications: Inflammation, Disp: , Rfl:     valsartan (DIOVAN) 320 MG tablet, Take 1 tablet by mouth Daily. Indications: High Blood Pressure  Disorder, Disp: 90 tablet, Rfl: 1    Vitamin D, Cholecalciferol, 1000 units capsule, Take 5 capsules by mouth Daily., Disp: , Rfl:     Alirocumab (Praluent) 75 MG/ML solution auto-injector, Inject 1 mL under the skin into the appropriate area as directed Every 14 (Fourteen) Days., Disp: 2.24 mL, Rfl: 3    aspirin 81 MG EC tablet, Take 1 tablet by mouth Daily. (Patient not taking: Reported on 7/8/2025), Disp: 30 tablet, Rfl: 0    diazePAM (VALIUM) 5 MG tablet, Take 1 tablet by mouth At Night As Needed for Anxiety or Sleep. (Patient not taking: Reported on 4/16/2025), Disp: 20 tablet, Rfl: 0    diphenhydrAMINE-acetaminophen (TYLENOL PM)  MG tablet per tablet, Take 1 tablet by mouth At Night As Needed for Sleep. (Patient not taking: Reported on 7/8/2025), Disp: , Rfl:     Ibuprofen 3 %, Gabapentin 10 %, Baclofen 2 %, lidocaine 4 %, Ketamine HCl 10 %, Apply 1-2 g topically to the appropriate area as directed 3 (Three) to 4 (Four) times daily., Disp: 90 g, Rfl: 5    melatonin 5 MG tablet tablet, Take 2 tablets by mouth At Night As Needed. (Patient not taking: Reported on 7/8/2025), Disp: , Rfl:     traZODone (DESYREL) 50 MG tablet, 1/2 tablet nightly x 1 week if tolerated increase to full tablet thereafter., Disp: 30 tablet, Rfl: 0      Family History   Problem Relation Age of Onset    COPD Mother         what was on her death certificate    Emphysema Mother     Heart disease Mother     Heart disease Father     Kidney disease Father         Was on dialysis    Vision loss Father     Cancer Father         Only diagnosed when he was 82    Diabetes Father     Lymphoma Father     Hyperlipidemia Father     Stroke Brother     Hypertension Brother     Coronary artery disease Brother         stent    Peripheral vascular disease Brother         stent in the carotid    Diabetes Brother     Heart disease Brother     No Known Problems Daughter     Drug abuse Son     Breast cancer Maternal Aunt     COPD Maternal Aunt          "Breast cancer         Social History     Socioeconomic History    Marital status:      Spouse name: Venu\"Miguel\"    Number of children: 2    Years of education: College degree   Tobacco Use    Smoking status: Former     Current packs/day: 0.00     Average packs/day: 1 pack/day for 11.0 years (11.0 ttl pk-yrs)     Types: Cigarettes     Start date:      Quit date:      Years since quittin.5    Smokeless tobacco: Never   Vaping Use    Vaping status: Never Used   Substance and Sexual Activity    Alcohol use: Yes     Comment: 0-1 per month    Drug use: No     Comment: quit marijuana 19yrs ago    Sexual activity: Yes     Partners: Male     Birth control/protection: Post-menopausal         Allergies   Allergen Reactions    Cephalexin Other (See Comments)     Other reaction(s): Unknown    Dextromethorphan Unknown - Low Severity    Elavil [Amitriptyline Hcl] Dizziness    Norvasc [Amlodipine Besylate] Swelling     Ankles swelling         Pain Scale:        ROS:  Review of Systems   Constitutional:  Negative for activity change, appetite change and fatigue.   HENT:  Negative for facial swelling, trouble swallowing and voice change.    Eyes:  Negative for photophobia, pain and visual disturbance.   Respiratory:  Negative for chest tightness, shortness of breath and wheezing.    Cardiovascular:  Negative for chest pain, palpitations and leg swelling.   Gastrointestinal:  Negative for abdominal pain, nausea and vomiting.   Endocrine: Negative for cold intolerance and heat intolerance.   Musculoskeletal:  Negative for arthralgias, back pain, gait problem, joint swelling, myalgias, neck pain and neck stiffness.   Neurological:  Negative for dizziness, tremors, seizures, syncope, facial asymmetry, speech difficulty, weakness, light-headedness, numbness and headaches.   Hematological:  Does not bruise/bleed easily.   Psychiatric/Behavioral:  Positive for dysphoric mood and sleep disturbance. Negative for " "agitation, behavioral problems, confusion, decreased concentration, hallucinations, self-injury and suicidal ideas. The patient is nervous/anxious. The patient is not hyperactive.            Physical Exam:  Vitals:    25 1417   BP: 110/78   BP Location: Left arm   Patient Position: Sitting   Cuff Size: Large Adult   Pulse: 76   SpO2: 98%   Weight: 83.5 kg (184 lb)   Height: 157.5 cm (62.01\")     Body mass index is 33.65 kg/m².      Physical Exam  Cranial Nerve Examination    CN II: Visual fields intact bilaterally.    CN III IV VI: Extraocular movements intact.    CN V: Facial sensation is intact bilaterally.    CN VII: Facial movements and symmetry normal.    CN XI: Shoulder shrug and head turn normal.    CN XII: Tongue is midline with normal movements.    Motor Examination    Strength: 5/5 strength in upper extremities.    Gait and Station    Gait: Slow walking pattern.    Other Neurological Observations: NIHSS score of zero.      Results  Labs   - A1c: 6.0   - Lipid Panel: Total cholesterol 421, triglycerides 210,    - Repeated Lipid Panel: Total cholesterol 399, triglycerides 181, HDL 55,    - Magnesium: 2.0    Imaging   - CT of the head: 2025, Decreased attenuation in the basal ganglia and evidence of chronic lacunar infarct without evidence of hemorrhage or mass effect   - MRI of the brain: 2025, Acute pontine and right basal ganglion infarct   - MRA: 2025, No evidence of high-grade stenosis/aneurysm and/or dissection   - Echocardiogram: 2025, EF of 58.1%. LV normal LA, mildly increased. Saline test was negative. No mention of PFO. Left atrial volume index was 26.4   - CT of the head: 2025, Stable with no evidence of acute infarct or hemorrhage    Diagnostic Testing   - EK2025, Sinus bradycardia,    - Holter Monitor: Abnormal due to ectopy including SVT and ventricular trigeminy, ventricular bigeminy and trigeminy noted      Lab Results   Component Value Date "    GLUCOSE 91 04/17/2025    BUN 14 04/17/2025    CREATININE 0.73 04/17/2025    EGFRIFNONA 74 09/16/2021    EGFRIFAFRI 89 09/16/2021    BCR 19.2 04/17/2025    CO2 23.0 04/17/2025    CALCIUM 9.4 04/17/2025    ALBUMIN 4.3 04/16/2025    AST 22 04/16/2025    ALT 18 04/16/2025       Lab Results   Component Value Date    WBC 7.94 04/16/2025    HGB 14.9 04/16/2025    HCT 44.4 04/16/2025    MCV 88.3 04/16/2025     04/16/2025       Lab Results   Component Value Date    TSH 1.030 04/16/2025    F3YBMZX 6.8 06/02/2015         Lab Results   Component Value Date    MSVBEWCE13 627 04/17/2025         Lab Results   Component Value Date    FOLATE >20.00 04/09/2025         Lab Results   Component Value Date    HGBA1C 6.10 (H) 04/17/2025         Assessment & Plan  1. Post-stroke status.  She experienced a significant ischemic stroke affecting the pontine and right basal ganglia regions. The recovery process is ongoing, with potential for improvement over the next year. Her NIHSS score is currently zero, indicating no new deficits. She is advised to continue Plavix and rosuvastatin indefinitely unless contraindicated. The addition of Repatha to her regimen is recommended for better cholesterol control. However, due to insurance coverage issues, alternative lipid-lowering agents will be explored. Continuation of Zetia is agreed upon for now. Trazodone will be initiated at night for sleep disturbances, starting with half a tablet (25 mg) for the first week. If tolerated well, the dosage can be increased to a full tablet (50 mg) in the second week. Further dosage adjustments can be made as necessary.    2. Hypertension.  She presented with severe hypertensive urgency with systolic blood pressure greater than 200 during hospitalization. She is currently on amlodipine 5 mg and valsartan 320 mg. Blood pressure control will be closely monitored.    3. Hyperlipidemia.  Her lipid panel showed significantly elevated levels. She is currently  on Crestor (rosuvastatin) 40 mg daily and Zetia 10 mg. The addition of Repatha was recommended but not covered by insurance. Alternative lipid-lowering agents will be explored.    4. B12 deficiency.  She has a history of B12 deficiency.    5. Gastroesophageal Reflux Disease (GERD).  She has a history of GERD.    6. Depression/anxiety.  She has a history of depression and anxiety.    7. Sleep apnea.  She has confirmed sleep apnea and is using a CPAP machine regularly.    8. Restless legs syndrome.  She reports symptoms of restless legs syndrome. An iron profile will be ordered to evaluate for potential iron deficiency. A topical cream containing ketamine, gabapentin, and a muscle relaxer will be prescribed for application four times daily to alleviate symptoms.    9. Urinary incontinence.  She reports complete urinary incontinence since her stroke. She has an upcoming appointment with urology on August 22.    10. Osteoarthritis.  She reports joint pain and is receiving injections in her knees. A topical cream containing ketamine, gabapentin, and a muscle relaxer will be prescribed for application four times daily to alleviate symptoms.    Follow-up  The patient will follow up in 3 months.    Plan:   Trazadone nightly  Continue Plavix 75mg daily and Crrestor and Zetia   Add Praluent 75 mg q 2 weeks; repeat lipid panel since > 30 days   Labs (below)   Follow up with sleep medicine; treat sleep apnea   RX Alt. Cream   Follow up with me in 3 months     Diagnoses and all orders for this visit:    1. Restless legs syndrome (RLS) (Primary)  -     Iron Profile + Ferritin ([facility] only); Future    2. Arthropathy, unspecified  -     Iron Profile + Ferritin ([facility] only); Future    3. Sleep apnea, unspecified type  -     traZODone (DESYREL) 50 MG tablet; 1/2 tablet nightly x 1 week if tolerated increase to full tablet thereafter.  Dispense: 30 tablet; Refill: 0    4. Other chronic pain  -     Ibuprofen 3 %, Gabapentin 10 %, Baclofen 2  %, lidocaine 4 %, Ketamine HCl 10 %; Apply 1-2 g topically to the appropriate area as directed 3 (Three) to 4 (Four) times daily.  Dispense: 90 g; Refill: 5    5. History of stroke  -     Lipid Panel; Future  -     Alirocumab (Praluent) 75 MG/ML solution auto-injector; Inject 1 mL under the skin into the appropriate area as directed Every 14 (Fourteen) Days.  Dispense: 2.24 mL; Refill: 3    6. Hyperlipidemia, unspecified hyperlipidemia type  -     Lipid Panel; Future      I spent 55 minutes caring for this patient on this date of service. This time includes time spent by me in the following activities:preparing for the visit, reviewing tests, obtaining and/or reviewing a separately obtained history, performing a medically appropriate examination and/or evaluation , counseling and educating the patient/family/caregiver, ordering medications, tests, or procedures, documenting information in the medical record, independently interpreting results and communicating that information with the patient/family/caregiver and care coordination.          Dictated utilizing Dragon dictation.

## 2025-08-25 ENCOUNTER — OFFICE VISIT (OUTPATIENT)
Dept: SLEEP MEDICINE | Facility: HOSPITAL | Age: 70
End: 2025-08-25
Payer: MEDICARE

## 2025-08-25 VITALS
BODY MASS INDEX: 33.86 KG/M2 | DIASTOLIC BLOOD PRESSURE: 66 MMHG | HEIGHT: 62 IN | WEIGHT: 184 LBS | OXYGEN SATURATION: 97 % | SYSTOLIC BLOOD PRESSURE: 110 MMHG | HEART RATE: 71 BPM

## 2025-08-25 DIAGNOSIS — E61.1 IRON DEFICIENCY: ICD-10-CM

## 2025-08-25 DIAGNOSIS — G47.34 NOCTURNAL HYPOXIA: ICD-10-CM

## 2025-08-25 DIAGNOSIS — E66.9 OBESITY (BMI 30-39.9): ICD-10-CM

## 2025-08-25 DIAGNOSIS — G47.33 OBSTRUCTIVE SLEEP APNEA, ADULT: Primary | ICD-10-CM

## 2025-08-25 DIAGNOSIS — G25.81 RESTLESS LEG SYNDROME: ICD-10-CM

## 2025-08-25 PROCEDURE — G0463 HOSPITAL OUTPT CLINIC VISIT: HCPCS

## 2025-08-25 RX ORDER — ZOLPIDEM TARTRATE 5 MG/1
TABLET ORAL
Qty: 2 TABLET | Refills: 0 | Status: SHIPPED | OUTPATIENT
Start: 2025-08-25

## 2025-08-25 RX ORDER — PRAMIPEXOLE DIHYDROCHLORIDE 0.25 MG/1
0.25 TABLET ORAL NIGHTLY
Qty: 30 TABLET | Refills: 2 | Status: SHIPPED | OUTPATIENT
Start: 2025-08-25 | End: 2025-11-23

## (undated) DEVICE — SPNG GZ WOVN 4X4IN 12PLY 10/BX STRL

## (undated) DEVICE — ENDO. PORT CONNECTOR W/VALVE FOR OLYMPUS® SCOPES: Brand: ERBE

## (undated) DEVICE — BW-412T DISP COMBO CLEANING BRUSH: Brand: SINGLE USE COMBINATION CLEANING BRUSH

## (undated) DEVICE — Device: Brand: DEFENDO AIR/WATER/SUCTION AND BIOPSY VALVE

## (undated) DEVICE — SUCTION CANISTER, 3000CC,SAFELINER: Brand: DEROYAL

## (undated) DEVICE — MASK,FACE,FLUID RESIST,SHLD,EARLOOP: Brand: MEDLINE

## (undated) DEVICE — GLV SURG SENSICARE MICRO PF LF 7.5 STRL

## (undated) DEVICE — JACKT LAB F/R KNIT CUFF/COLR XLG BLU

## (undated) DEVICE — GOWN ISOL W/THUMB UNIV BLU BX/15

## (undated) DEVICE — SYR LL 3CC

## (undated) DEVICE — Device